# Patient Record
Sex: MALE | Race: OTHER | Employment: FULL TIME | URBAN - METROPOLITAN AREA
[De-identification: names, ages, dates, MRNs, and addresses within clinical notes are randomized per-mention and may not be internally consistent; named-entity substitution may affect disease eponyms.]

---

## 2017-01-21 ENCOUNTER — APPOINTMENT (OUTPATIENT)
Dept: LAB | Age: 52
End: 2017-01-21
Attending: INTERNAL MEDICINE
Payer: COMMERCIAL

## 2017-01-21 DIAGNOSIS — E78.1 HYPERTRIGLYCERIDEMIA: ICD-10-CM

## 2017-01-21 LAB
CHOLEST SERPL-MCNC: 153 MG/DL (ref 110–200)
HDLC SERPL-MCNC: 35 MG/DL
LDLC SERPL CALC-MCNC: 79 MG/DL (ref 0–99)
NONHDLC SERPL-MCNC: 118 MG/DL
TRIGL SERPL-MCNC: 196 MG/DL (ref 1–149)

## 2017-01-21 PROCEDURE — 80061 LIPID PANEL: CPT

## 2017-01-21 PROCEDURE — 36415 COLL VENOUS BLD VENIPUNCTURE: CPT

## 2017-01-24 ENCOUNTER — TELEPHONE (OUTPATIENT)
Dept: ENDOCRINOLOGY CLINIC | Facility: CLINIC | Age: 52
End: 2017-01-24

## 2017-01-24 NOTE — TELEPHONE ENCOUNTER
Tried calling patient back. Phone disconnected and then went to . Will route to AM to review when she next logs in. Results not urgent at this time.

## 2017-01-25 RX ORDER — DAPAGLIFLOZIN 5 MG/1
TABLET, FILM COATED ORAL
Qty: 90 TABLET | Refills: 0 | Status: SHIPPED | OUTPATIENT
Start: 2017-01-25 | End: 2017-01-26 | Stop reason: DRUGHIGH

## 2017-01-26 ENCOUNTER — TELEPHONE (OUTPATIENT)
Dept: ENDOCRINOLOGY CLINIC | Facility: CLINIC | Age: 52
End: 2017-01-26

## 2017-01-26 NOTE — TELEPHONE ENCOUNTER
[email or web address]. PA completed and sent to plan. Will await response.

## 2017-01-26 NOTE — TELEPHONE ENCOUNTER
Spoke with Christian and informed him of Dr. Shirin Calvin instructions below. He verbalized his understanding. He will use up his old Farxiga 5mg tablets by taking 2 per day then start new pills 10 mg 1 tab per day.

## 2017-01-27 ENCOUNTER — TELEPHONE (OUTPATIENT)
Dept: ENDOCRINOLOGY CLINIC | Facility: CLINIC | Age: 52
End: 2017-01-27

## 2017-01-27 NOTE — TELEPHONE ENCOUNTER
[facility] Called patient and let him know it is available. He verbalized his understanding.

## 2017-01-27 NOTE — TELEPHONE ENCOUNTER
PA for farxiga 10 mg has been approved through  1/26/18.  Called patient's pharmacy and informed them of approval.

## 2017-02-03 RX ORDER — LIRAGLUTIDE 6 MG/ML
INJECTION SUBCUTANEOUS
Qty: 27 ML | Refills: 0 | Status: SHIPPED | OUTPATIENT
Start: 2017-02-03 | End: 2017-05-14

## 2017-03-15 ENCOUNTER — TELEPHONE (OUTPATIENT)
Dept: GASTROENTEROLOGY | Facility: CLINIC | Age: 52
End: 2017-03-15

## 2017-03-20 ENCOUNTER — TELEPHONE (OUTPATIENT)
Dept: GASTROENTEROLOGY | Facility: CLINIC | Age: 52
End: 2017-03-20

## 2017-03-20 ENCOUNTER — OFFICE VISIT (OUTPATIENT)
Dept: GASTROENTEROLOGY | Facility: CLINIC | Age: 52
End: 2017-03-20

## 2017-03-20 VITALS
DIASTOLIC BLOOD PRESSURE: 80 MMHG | HEART RATE: 77 BPM | WEIGHT: 192.63 LBS | BODY MASS INDEX: 27.89 KG/M2 | SYSTOLIC BLOOD PRESSURE: 132 MMHG | HEIGHT: 69.5 IN

## 2017-03-20 DIAGNOSIS — Z80.0 FAMILY HISTORY- STOMACH CANCER: ICD-10-CM

## 2017-03-20 DIAGNOSIS — Z12.11 SCREENING FOR COLON CANCER: Primary | ICD-10-CM

## 2017-03-20 PROCEDURE — 99243 OFF/OP CNSLTJ NEW/EST LOW 30: CPT | Performed by: NURSE PRACTITIONER

## 2017-03-20 PROCEDURE — 99212 OFFICE O/P EST SF 10 MIN: CPT | Performed by: NURSE PRACTITIONER

## 2017-03-20 NOTE — TELEPHONE ENCOUNTER
All of GI staff gone, will follow up tomorrow--do not see where pt has been scheduled yet . Will then send message to Dr Pleitez Head.

## 2017-03-20 NOTE — H&P
[address] [facility] [address] Gastroenterology                                                                                                  Clinic History and Physical     Pa pertinent past surgical history.    Family Hx:   Family History   Problem Relation Age of Onset   • Diabetes Father    • Cancer Mother      stomach cancer at 80      Social History:   Smoking Status: Never Smoker                      Alcohol Use: No Blood pressure 132/80, pulse 77, height 5' 9.5\" (1.765 m), weight 192 lb 9.6 oz (87.363 kg).     Gen: patient appears comfortable and in no acute distress  HEENT: conjunctiva pink, the sclera appears anicteric, oropharynx clear, mucus membranes appear mo and information will be relayed to pt    4.  Non-productive cough: pt advised to f/u w/ PCP for evaluation and treatment      Recommend:  -Schedule colonoscopy w/ Dr. Edward Medrano w/ IV Twilight  -Prep: Colyte (eRx)  -Anti-platelets and anti-coagulants: No

## 2017-03-20 NOTE — PATIENT INSTRUCTIONS
1. Schedule colonoscopy with Dr. Jenni Montgomery with IV twilight    2.  bowel prep from [address] [place] [address] (eR)    3. Medication adjustment:       A. Hold metformin the day before and day of the procedure.      B. My office will contact Dr. Jonna Colon

## 2017-03-20 NOTE — TELEPHONE ENCOUNTER
Nursing– please follow-up with Dr. Jessie Sinclair  (endocrinology) for recommendations regarding the Victoza/Farxiga dosing prior to colonoscopy. Thank you.

## 2017-03-21 ENCOUNTER — OFFICE VISIT (OUTPATIENT)
Dept: FAMILY MEDICINE CLINIC | Facility: CLINIC | Age: 52
End: 2017-03-21

## 2017-03-21 ENCOUNTER — HOSPITAL ENCOUNTER (OUTPATIENT)
Dept: GENERAL RADIOLOGY | Age: 52
Discharge: HOME | End: 2017-03-21
Attending: FAMILY MEDICINE
Payer: COMMERCIAL

## 2017-03-21 VITALS
BODY MASS INDEX: 27.51 KG/M2 | DIASTOLIC BLOOD PRESSURE: 81 MMHG | HEIGHT: 69.5 IN | WEIGHT: 190 LBS | OXYGEN SATURATION: 98 % | TEMPERATURE: 98 F | HEART RATE: 80 BPM | SYSTOLIC BLOOD PRESSURE: 116 MMHG

## 2017-03-21 DIAGNOSIS — R05.9 COUGH: ICD-10-CM

## 2017-03-21 DIAGNOSIS — R05.9 COUGH: Primary | ICD-10-CM

## 2017-03-21 PROCEDURE — 71020 XR CHEST PA + LAT CHEST (CPT=71020): CPT

## 2017-03-21 PROCEDURE — 99213 OFFICE O/P EST LOW 20 MIN: CPT | Performed by: FAMILY MEDICINE

## 2017-03-21 PROCEDURE — 94640 AIRWAY INHALATION TREATMENT: CPT | Performed by: FAMILY MEDICINE

## 2017-03-21 PROCEDURE — 99214 OFFICE O/P EST MOD 30 MIN: CPT | Performed by: FAMILY MEDICINE

## 2017-03-21 RX ORDER — ALBUTEROL SULFATE 2.5 MG/3ML
2.5 SOLUTION RESPIRATORY (INHALATION) ONCE
Status: COMPLETED | OUTPATIENT
Start: 2017-03-21 | End: 2017-03-21

## 2017-03-21 RX ORDER — ALBUTEROL SULFATE 90 UG/1
2 AEROSOL, METERED RESPIRATORY (INHALATION) EVERY 4 HOURS PRN
Qty: 1 INHALER | Refills: 6 | Status: SHIPPED | OUTPATIENT
Start: 2017-03-21 | End: 2017-06-02 | Stop reason: ALTCHOICE

## 2017-03-21 RX ORDER — CODEINE PHOSPHATE AND GUAIFENESIN 10; 100 MG/5ML; MG/5ML
10 SOLUTION ORAL EVERY 6 HOURS PRN
Qty: 200 ML | Refills: 0 | Status: SHIPPED | OUTPATIENT
Start: 2017-03-21 | End: 2017-06-02

## 2017-03-21 RX ORDER — MONTELUKAST SODIUM 10 MG/1
10 TABLET ORAL NIGHTLY
Qty: 30 TABLET | Refills: 0 | Status: SHIPPED | OUTPATIENT
Start: 2017-03-21 | End: 2018-07-03

## 2017-03-21 RX ORDER — AMOXICILLIN AND CLAVULANATE POTASSIUM 875; 125 MG/1; MG/1
TABLET, FILM COATED ORAL
Refills: 0 | COMMUNITY
Start: 2017-03-10 | End: 2017-03-24 | Stop reason: ALTCHOICE

## 2017-03-21 RX ADMIN — ALBUTEROL SULFATE 2.5 MG: 2.5 SOLUTION RESPIRATORY (INHALATION) at 09:48:00

## 2017-03-21 NOTE — PROGRESS NOTES
Cough 3 weeks   On augmentin for tooth work for almost 10 days.   Was on medrol pack without benifiti    Where: nasal congestion and cough  Quality (Constant/Sharp?): sharp  Severity: mild mod pain  Duration: 3 weeks  Timing: constant  When does it occur: d

## 2017-03-21 NOTE — TELEPHONE ENCOUNTER
I spoke to Dr Alisia Everett in person, clarifying diabetic orders: Take only Metformin day before procedure. Take only Metformin morning of procedure. Check blood glucose in AM before procedure and call Dr Alisia Everett if less than 80 or greater than 250.   May

## 2017-03-21 NOTE — TELEPHONE ENCOUNTER
Dr. Corbett Standard please note below colonoscopy. Pt will have light breakfast day prior, then clear liquids only. Please advise on Metformin, Victoza, and [place]. Pt has appt with you 3/24. Thanks very much.      I spoke to [facility] RN who scheduled pt last night

## 2017-03-24 ENCOUNTER — OFFICE VISIT (OUTPATIENT)
Dept: ENDOCRINOLOGY CLINIC | Facility: CLINIC | Age: 52
End: 2017-03-24

## 2017-03-24 ENCOUNTER — TELEPHONE (OUTPATIENT)
Dept: ENDOCRINOLOGY CLINIC | Facility: CLINIC | Age: 52
End: 2017-03-24

## 2017-03-24 VITALS
HEIGHT: 69.5 IN | WEIGHT: 188 LBS | DIASTOLIC BLOOD PRESSURE: 75 MMHG | RESPIRATION RATE: 18 BRPM | TEMPERATURE: 98 F | HEART RATE: 74 BPM | BODY MASS INDEX: 27.22 KG/M2 | SYSTOLIC BLOOD PRESSURE: 112 MMHG

## 2017-03-24 DIAGNOSIS — E11.9 TYPE 2 DIABETES MELLITUS WITHOUT COMPLICATION, WITHOUT LONG-TERM CURRENT USE OF INSULIN (HCC): Primary | ICD-10-CM

## 2017-03-24 LAB
CARTRIDGE LOT#: ABNORMAL NUMERIC
GLUCOSE BLOOD: 106
HEMOGLOBIN A1C: 7 % (ref 4.3–5.6)
TEST STRIP LOT #: NORMAL NUMERIC

## 2017-03-24 PROCEDURE — 82962 GLUCOSE BLOOD TEST: CPT | Performed by: INTERNAL MEDICINE

## 2017-03-24 PROCEDURE — 83036 HEMOGLOBIN GLYCOSYLATED A1C: CPT | Performed by: INTERNAL MEDICINE

## 2017-03-24 PROCEDURE — 36416 COLLJ CAPILLARY BLOOD SPEC: CPT | Performed by: INTERNAL MEDICINE

## 2017-03-24 PROCEDURE — 99213 OFFICE O/P EST LOW 20 MIN: CPT | Performed by: INTERNAL MEDICINE

## 2017-03-24 NOTE — TELEPHONE ENCOUNTER
Saw patient in the clinic. Please see below. Some changes were made. Day before procedure: MTF and victoza  Day of procedure: MARISELA in the morning, he will come home and take [facility].

## 2017-03-27 NOTE — TELEPHONE ENCOUNTER
See 3/24 Dr Jonna Colon encounter. Pt was in office and was given below diabetic instructions per the doctor. No further action taken.

## 2017-04-01 ENCOUNTER — TELEPHONE (OUTPATIENT)
Dept: FAMILY MEDICINE CLINIC | Facility: CLINIC | Age: 52
End: 2017-04-01

## 2017-04-01 RX ORDER — BENZONATATE 200 MG/1
200 CAPSULE ORAL 3 TIMES DAILY PRN
Qty: 30 CAPSULE | Refills: 0 | Status: SHIPPED | OUTPATIENT
Start: 2017-04-01 | End: 2017-06-02

## 2017-04-01 NOTE — TELEPHONE ENCOUNTER
Actions Requested: No improvement in dry cough since seen 3/21/17 and taking meds as prescribed (only present when up). Wanted f/u appt with [facility] today but schedule is held. Asking what else can be done?   Situation/Background   Problem: Dry cough continues

## 2017-04-01 NOTE — TELEPHONE ENCOUNTER
Tessalon 200mg 1 po tid prn cough #30 no refills. cxr normal.  Not unusual to cough 18 days with bronchitis.

## 2017-04-01 NOTE — TELEPHONE ENCOUNTER
Barbadian speaking Patient called and states he has had a cough for a month and a half and it just does not want to go away   Patient states he came in to see doctor and has been prescribed medication but it has not worked he still has the cough   The cough

## 2017-04-01 NOTE — TELEPHONE ENCOUNTER
Pt informed of [facility]'s response below and agrees with Tessalon med rx. Will call back next week if not improving and will go to IC if worsening before then. rx sent.

## 2017-04-04 ENCOUNTER — TELEPHONE (OUTPATIENT)
Dept: FAMILY MEDICINE CLINIC | Facility: CLINIC | Age: 52
End: 2017-04-04

## 2017-04-04 DIAGNOSIS — R05.9 COUGH: Primary | ICD-10-CM

## 2017-04-04 NOTE — TELEPHONE ENCOUNTER
Pt called on 4/1/17 regarding persistent dry cough since 3/21/17. Was rx's tessalon TID. Pt states has been taking as prescribed since Saturday; no improvement in dry cough. States cough very frequent during the day. At night pt able to sleep, no issues.  p

## 2017-04-04 NOTE — TELEPHONE ENCOUNTER
Patient notified of MD's recommendation. Patient verbalized understanding.   PULM # given for patient to consult

## 2017-04-04 NOTE — TELEPHONE ENCOUNTER
Pt states that he still has the cough. Pt states that the medication that was prescribed to him is not working. Pt would like know what else doctor recommends. Pt is requesting Liberian speaking RN to call her back.

## 2017-04-25 NOTE — TELEPHONE ENCOUNTER
Dr. Lambert Christensen please note below colonoscopy. Pt will have light breakfast day prior, then clear liquids only. Please advise on Metformin, Victoza, and [place]. Pt has appt with you 3/24. Thanks very much.      I spoke to Meeta Vega RN who scheduled pt last night

## 2017-04-25 NOTE — TELEPHONE ENCOUNTER
Due to the MAC/IV schedule LM for patient to reschedule for the following time:    Scheduled for: Enzo Nichols    Provider Name: Dr. Aniya Weldon  Date:  Friday, 5/12/17  Location:  [facility]  Sedation:  IV  Time:  9:45am(arrival of 8:45am)  Prep: Colyte  Meds/Allergies R

## 2017-04-26 RX ORDER — DAPAGLIFLOZIN 10 MG/1
TABLET, FILM COATED ORAL
Qty: 90 TABLET | Refills: 0 | Status: SHIPPED | OUTPATIENT
Start: 2017-04-26 | End: 2017-07-31

## 2017-05-12 ENCOUNTER — HOSPITAL ENCOUNTER (OUTPATIENT)
Facility: HOSPITAL | Age: 52
Setting detail: HOSPITAL OUTPATIENT SURGERY
Discharge: HOME | End: 2017-05-12
Attending: INTERNAL MEDICINE | Admitting: INTERNAL MEDICINE
Payer: COMMERCIAL

## 2017-05-12 ENCOUNTER — SURGERY (OUTPATIENT)
Age: 52
End: 2017-05-12

## 2017-05-12 VITALS
WEIGHT: 187.31 LBS | DIASTOLIC BLOOD PRESSURE: 87 MMHG | HEIGHT: 70 IN | OXYGEN SATURATION: 97 % | BODY MASS INDEX: 26.81 KG/M2 | HEART RATE: 67 BPM | RESPIRATION RATE: 22 BRPM | SYSTOLIC BLOOD PRESSURE: 126 MMHG

## 2017-05-12 PROCEDURE — 45378 DIAGNOSTIC COLONOSCOPY: CPT | Performed by: INTERNAL MEDICINE

## 2017-05-12 PROCEDURE — 0DJD8ZZ INSPECTION OF LOWER INTESTINAL TRACT, VIA NATURAL OR ARTIFICIAL OPENING ENDOSCOPIC: ICD-10-PCS | Performed by: INTERNAL MEDICINE

## 2017-05-12 PROCEDURE — 99152 MOD SED SAME PHYS/QHP 5/>YRS: CPT | Performed by: INTERNAL MEDICINE

## 2017-05-12 RX ORDER — SODIUM CHLORIDE 0.9 % (FLUSH) 0.9 %
10 SYRINGE (ML) INJECTION AS NEEDED
Status: DISCONTINUED | OUTPATIENT
Start: 2017-05-12 | End: 2017-05-12

## 2017-05-12 RX ORDER — MIDAZOLAM HYDROCHLORIDE 1 MG/ML
1 INJECTION INTRAMUSCULAR; INTRAVENOUS EVERY 5 MIN PRN
Status: DISCONTINUED | OUTPATIENT
Start: 2017-05-12 | End: 2017-05-12

## 2017-05-12 RX ORDER — SODIUM CHLORIDE, SODIUM LACTATE, POTASSIUM CHLORIDE, CALCIUM CHLORIDE 600; 310; 30; 20 MG/100ML; MG/100ML; MG/100ML; MG/100ML
INJECTION, SOLUTION INTRAVENOUS CONTINUOUS
Status: DISCONTINUED | OUTPATIENT
Start: 2017-05-12 | End: 2017-05-12

## 2017-05-12 RX ORDER — MIDAZOLAM HYDROCHLORIDE 1 MG/ML
INJECTION INTRAMUSCULAR; INTRAVENOUS
Status: DISCONTINUED | OUTPATIENT
Start: 2017-05-12 | End: 2017-05-12

## 2017-05-12 NOTE — H&P
History & Physical Examination    Patient Name: Dalia Millan  MRN: [id number]  [facility]: [id number]  YOB: 1965    Diagnosis: colon screening          Prescriptions prior to admission:  FARXIGA 10 MG Oral Tab TAKE 1 TABLET BY MOUTH Barber Anaya fentaNYL citrate (SUBLIMAZE) 0.05 MG/ML injection 25 mcg 25 mcg Intravenous Q5 Min PRN       Allergies: No Known Allergies    Past Medical History   Diagnosis Date   • Type II or unspecified type diabetes mellitus without mention of complication, not sta

## 2017-05-12 NOTE — OPERATIVE REPORT
[facility] HOSP - [facility] Endoscopy Report      Preoperative Diagnosis:  - colon cancer screening      Postoperative Diagnosis:  - small internal hemorrhoids      Procedure:    Colonoscopy         Surgeon:  Michaela Cruz M.D.     Anesthesia:  Fentanyl:  1

## 2017-05-15 RX ORDER — LIRAGLUTIDE 6 MG/ML
INJECTION SUBCUTANEOUS
Qty: 27 ML | Refills: 0 | Status: SHIPPED | OUTPATIENT
Start: 2017-05-15 | End: 2017-06-02

## 2017-06-02 ENCOUNTER — OFFICE VISIT (OUTPATIENT)
Dept: FAMILY MEDICINE CLINIC | Facility: CLINIC | Age: 52
End: 2017-06-02

## 2017-06-02 VITALS
DIASTOLIC BLOOD PRESSURE: 74 MMHG | SYSTOLIC BLOOD PRESSURE: 113 MMHG | WEIGHT: 194 LBS | BODY MASS INDEX: 28 KG/M2 | HEART RATE: 69 BPM

## 2017-06-02 DIAGNOSIS — M25.512 CHRONIC LEFT SHOULDER PAIN: Primary | ICD-10-CM

## 2017-06-02 DIAGNOSIS — G89.29 CHRONIC LEFT SHOULDER PAIN: Primary | ICD-10-CM

## 2017-06-02 PROCEDURE — 99212 OFFICE O/P EST SF 10 MIN: CPT | Performed by: FAMILY MEDICINE

## 2017-06-02 NOTE — PROGRESS NOTES
Chronic left shoulder pain  Mri  Reviewed  \"It hurts all the time. \"  Sleeping well    Exam  Pain with rom testing  No swelling    A/p  1.  Chronic left shoulder pain  Referral.  - Fede Crane

## 2017-06-22 RX ORDER — PEN NEEDLE, DIABETIC 31 GX5/16"
NEEDLE, DISPOSABLE MISCELLANEOUS
Qty: 90 EACH | Refills: 2 | Status: SHIPPED | OUTPATIENT
Start: 2017-06-22 | End: 2018-02-03

## 2017-07-31 RX ORDER — DAPAGLIFLOZIN 10 MG/1
TABLET, FILM COATED ORAL
Qty: 90 TABLET | Refills: 0 | Status: SHIPPED | OUTPATIENT
Start: 2017-07-31 | End: 2017-10-23

## 2017-08-09 RX ORDER — LIRAGLUTIDE 6 MG/ML
INJECTION SUBCUTANEOUS
Qty: 27 ML | Refills: 0 | Status: SHIPPED | OUTPATIENT
Start: 2017-08-09 | End: 2017-11-10

## 2017-08-09 NOTE — TELEPHONE ENCOUNTER
LOV 3/24/17 with RTC 4 months. No F/U scheduled. Called patient with a . Booked appt with Dr. Francis Elder on Friday.

## 2017-08-11 ENCOUNTER — OFFICE VISIT (OUTPATIENT)
Dept: ENDOCRINOLOGY CLINIC | Facility: CLINIC | Age: 52
End: 2017-08-11

## 2017-08-11 VITALS
HEART RATE: 78 BPM | DIASTOLIC BLOOD PRESSURE: 66 MMHG | BODY MASS INDEX: 27.66 KG/M2 | WEIGHT: 193.19 LBS | SYSTOLIC BLOOD PRESSURE: 112 MMHG | HEIGHT: 70 IN

## 2017-08-11 DIAGNOSIS — E11.9 TYPE 2 DIABETES MELLITUS WITHOUT COMPLICATION, WITHOUT LONG-TERM CURRENT USE OF INSULIN (HCC): Primary | ICD-10-CM

## 2017-08-11 LAB
CARTRIDGE LOT#: ABNORMAL NUMERIC
GLUCOSE BLOOD: 126
HEMOGLOBIN A1C: 7 % (ref 4.3–5.6)
TEST STRIP LOT #: NORMAL NUMERIC

## 2017-08-11 PROCEDURE — 36416 COLLJ CAPILLARY BLOOD SPEC: CPT | Performed by: INTERNAL MEDICINE

## 2017-08-11 PROCEDURE — 99212 OFFICE O/P EST SF 10 MIN: CPT | Performed by: INTERNAL MEDICINE

## 2017-08-11 PROCEDURE — 99213 OFFICE O/P EST LOW 20 MIN: CPT | Performed by: INTERNAL MEDICINE

## 2017-08-11 PROCEDURE — 83036 HEMOGLOBIN GLYCOSYLATED A1C: CPT | Performed by: INTERNAL MEDICINE

## 2017-08-11 PROCEDURE — 82962 GLUCOSE BLOOD TEST: CPT | Performed by: INTERNAL MEDICINE

## 2017-08-11 NOTE — PROGRESS NOTES
Return Office Visit     CHIEF COMPLAINT:    DM     HISTORY OF PRESENT ILLNESS:  Neha Brown is a 47year old male who presents for follow up for for DM.      DM HISTORY:  Diagnosed: Around age 29      HISTORY OF DIABETES COMPLICATIONS: :  Histo reviewed. See past social history marked as reviewed.     ASSESSMENTS:       REVIEW OF SYSTEMS:  Constitutional: Negative for: weight change, fever, fatigue, cold/heat intolerance  Eyes: Negative for:  Visual changes, proptosis, blurring  ENT: Negative for understands the importance of glycemic control and the implications of uncontrolled diabetes including Diabetic ketoacidosis and various micro vascular and macrovascular complications. Goal a1c: < 7 %   Goal Fasting, Goal pre meal:   a).  Medicatio

## 2017-08-28 RX ORDER — TADALAFIL 5 MG
TABLET ORAL
Qty: 30 TABLET | Refills: 0 | Status: SHIPPED | OUTPATIENT
Start: 2017-08-28 | End: 2018-07-22

## 2017-10-23 ENCOUNTER — TELEPHONE (OUTPATIENT)
Dept: ENDOCRINOLOGY CLINIC | Facility: CLINIC | Age: 52
End: 2017-10-23

## 2017-10-23 RX ORDER — DAPAGLIFLOZIN 10 MG/1
TABLET, FILM COATED ORAL
Qty: 90 TABLET | Refills: 0 | Status: SHIPPED | OUTPATIENT
Start: 2017-10-23 | End: 2018-01-27

## 2017-10-23 NOTE — TELEPHONE ENCOUNTER
Sent patient a [employer] message informing him Dr. Beto Levi would like to see him for follow up in January.

## 2017-10-27 ENCOUNTER — LAB ENCOUNTER (OUTPATIENT)
Dept: LAB | Age: 52
End: 2017-10-27
Attending: INTERNAL MEDICINE
Payer: COMMERCIAL

## 2017-10-27 ENCOUNTER — OFFICE VISIT (OUTPATIENT)
Dept: FAMILY MEDICINE CLINIC | Facility: CLINIC | Age: 52
End: 2017-10-27

## 2017-10-27 VITALS
WEIGHT: 193 LBS | BODY MASS INDEX: 28 KG/M2 | SYSTOLIC BLOOD PRESSURE: 113 MMHG | TEMPERATURE: 99 F | HEART RATE: 73 BPM | DIASTOLIC BLOOD PRESSURE: 76 MMHG

## 2017-10-27 DIAGNOSIS — R59.1 LYMPHADENOPATHY: ICD-10-CM

## 2017-10-27 DIAGNOSIS — Z23 NEED FOR VACCINATION: Primary | ICD-10-CM

## 2017-10-27 DIAGNOSIS — E11.9 TYPE 2 DIABETES MELLITUS WITHOUT COMPLICATION, WITHOUT LONG-TERM CURRENT USE OF INSULIN (HCC): ICD-10-CM

## 2017-10-27 PROCEDURE — 85025 COMPLETE CBC W/AUTO DIFF WBC: CPT

## 2017-10-27 PROCEDURE — 90471 IMMUNIZATION ADMIN: CPT | Performed by: FAMILY MEDICINE

## 2017-10-27 PROCEDURE — 90686 IIV4 VACC NO PRSV 0.5 ML IM: CPT | Performed by: FAMILY MEDICINE

## 2017-10-27 PROCEDURE — 82570 ASSAY OF URINE CREATININE: CPT

## 2017-10-27 PROCEDURE — 99212 OFFICE O/P EST SF 10 MIN: CPT | Performed by: FAMILY MEDICINE

## 2017-10-27 PROCEDURE — 80061 LIPID PANEL: CPT

## 2017-10-27 PROCEDURE — 82043 UR ALBUMIN QUANTITATIVE: CPT

## 2017-10-27 PROCEDURE — 99213 OFFICE O/P EST LOW 20 MIN: CPT | Performed by: FAMILY MEDICINE

## 2017-10-27 PROCEDURE — 36415 COLL VENOUS BLD VENIPUNCTURE: CPT

## 2017-10-27 RX ORDER — NAPROXEN 500 MG/1
500 TABLET ORAL 2 TIMES DAILY WITH MEALS
Qty: 60 TABLET | Refills: 0 | Status: SHIPPED | OUTPATIENT
Start: 2017-10-27 | End: 2018-07-03

## 2017-10-27 RX ORDER — AZITHROMYCIN 250 MG/1
TABLET, FILM COATED ORAL
Qty: 6 TABLET | Refills: 0 | Status: SHIPPED | OUTPATIENT
Start: 2017-10-27 | End: 2018-01-19 | Stop reason: ALTCHOICE

## 2017-10-27 NOTE — PROGRESS NOTES
Left neck pain  1 week  No fever  Had dental implant work    No sore throat. Exam    Patient's past medical surgical family social history was reviewed.     Review of Systems  Allergic: no environmental allergies or food allergies  Cardiovascular: no c

## 2017-10-28 ENCOUNTER — TELEPHONE (OUTPATIENT)
Dept: ENDOCRINOLOGY CLINIC | Facility: CLINIC | Age: 52
End: 2017-10-28

## 2017-10-28 DIAGNOSIS — E11.65 UNCONTROLLED TYPE 2 DIABETES MELLITUS WITH HYPERGLYCEMIA, WITHOUT LONG-TERM CURRENT USE OF INSULIN (HCC): Primary | ICD-10-CM

## 2017-10-28 NOTE — TELEPHONE ENCOUNTER
LDL is normal  TG levels are elevated. Please mail low fat diet. Has protein in urine BG control will help. Also, I recommend starting a medictaion called lisinopril 2.5 mg daily. This prevents progression to kidney dsease.    Please discuss SE of cou

## 2017-10-31 ENCOUNTER — TELEPHONE (OUTPATIENT)
Dept: FAMILY MEDICINE CLINIC | Facility: CLINIC | Age: 52
End: 2017-10-31

## 2017-10-31 NOTE — TELEPHONE ENCOUNTER
Pt would like his blood test results entered in his my chart. Per pt he had his blood work done this weekend.

## 2017-11-01 RX ORDER — LISINOPRIL 2.5 MG/1
2.5 TABLET ORAL DAILY
Qty: 90 TABLET | Refills: 0 | Status: SHIPPED | OUTPATIENT
Start: 2017-11-01 | End: 2018-01-27

## 2017-11-01 NOTE — TELEPHONE ENCOUNTER
Spoke with [employer]. Informed him of Dr. Denny Donato message below. Mailed low fat diet information. Prescribed medication. Ordered lab.

## 2017-11-10 ENCOUNTER — TELEPHONE (OUTPATIENT)
Dept: ENDOCRINOLOGY CLINIC | Facility: CLINIC | Age: 52
End: 2017-11-10

## 2017-11-10 RX ORDER — LIRAGLUTIDE 6 MG/ML
INJECTION SUBCUTANEOUS
Qty: 27 ML | Refills: 0 | Status: SHIPPED | OUTPATIENT
Start: 2017-11-10 | End: 2018-02-03

## 2017-11-10 NOTE — TELEPHONE ENCOUNTER
[facility] [address] [place] [id number]. Informed him that he will be due for his next appt in January. Booked appt in January.

## 2018-01-19 ENCOUNTER — OFFICE VISIT (OUTPATIENT)
Dept: ENDOCRINOLOGY CLINIC | Facility: CLINIC | Age: 53
End: 2018-01-19

## 2018-01-19 VITALS
HEIGHT: 70 IN | SYSTOLIC BLOOD PRESSURE: 112 MMHG | DIASTOLIC BLOOD PRESSURE: 73 MMHG | BODY MASS INDEX: 27.83 KG/M2 | HEART RATE: 84 BPM | WEIGHT: 194.38 LBS

## 2018-01-19 DIAGNOSIS — E78.5 DYSLIPIDEMIA: ICD-10-CM

## 2018-01-19 DIAGNOSIS — E11.65 UNCONTROLLED TYPE 2 DIABETES MELLITUS WITH HYPERGLYCEMIA, WITHOUT LONG-TERM CURRENT USE OF INSULIN (HCC): Primary | ICD-10-CM

## 2018-01-19 PROBLEM — E78.1 HYPERTRIGLYCERIDEMIA: Status: ACTIVE | Noted: 2018-01-19

## 2018-01-19 LAB
CARTRIDGE LOT#: ABNORMAL NUMERIC
GLUCOSE BLOOD: 185
HEMOGLOBIN A1C: 6.6 % (ref 4.3–5.6)
TEST STRIP LOT #: NORMAL NUMERIC

## 2018-01-19 PROCEDURE — 36416 COLLJ CAPILLARY BLOOD SPEC: CPT | Performed by: INTERNAL MEDICINE

## 2018-01-19 PROCEDURE — 99213 OFFICE O/P EST LOW 20 MIN: CPT | Performed by: INTERNAL MEDICINE

## 2018-01-19 PROCEDURE — 83036 HEMOGLOBIN GLYCOSYLATED A1C: CPT | Performed by: INTERNAL MEDICINE

## 2018-01-19 PROCEDURE — 99212 OFFICE O/P EST SF 10 MIN: CPT | Performed by: INTERNAL MEDICINE

## 2018-01-19 PROCEDURE — 82962 GLUCOSE BLOOD TEST: CPT | Performed by: INTERNAL MEDICINE

## 2018-01-19 NOTE — PROGRESS NOTES
Return Office Visit     CHIEF COMPLAINT:    DM   Hypertriglyceridemia    HISTORY OF PRESENT ILLNESS:  Jailene Francois is a 47year old male who presents for follow up for for DM.      DM HISTORY:  Diagnosed: Around age 29      HISTORY OF DIABETES C past medical history marked as reviewed. See past surgical history marked as reviewed. See past family history marked as reviewed. See past social history marked as reviewed.     ASSESSMENTS:       REVIEW OF SYSTEMS:  Constitutional: Negative for: weight DM:       Plan:  Discussed the pathogenesis, natural course of diabetes.  Patient understands the importance of glycemic control and the implications of uncontrolled diabetes including Diabetic ketoacidosis and various micro vascular and macrovascular compl

## 2018-01-29 RX ORDER — LISINOPRIL 2.5 MG/1
TABLET ORAL
Qty: 90 TABLET | Refills: 0 | Status: SHIPPED | OUTPATIENT
Start: 2018-01-29 | End: 2018-05-02

## 2018-01-29 RX ORDER — DAPAGLIFLOZIN 10 MG/1
TABLET, FILM COATED ORAL
Qty: 90 TABLET | Refills: 0 | OUTPATIENT
Start: 2018-01-29

## 2018-01-29 RX ORDER — LISINOPRIL 2.5 MG/1
TABLET ORAL
Qty: 90 TABLET | Refills: 0 | OUTPATIENT
Start: 2018-01-29

## 2018-01-29 RX ORDER — DAPAGLIFLOZIN 10 MG/1
TABLET, FILM COATED ORAL
Qty: 90 TABLET | Refills: 0 | Status: SHIPPED | OUTPATIENT
Start: 2018-01-29 | End: 2018-02-07

## 2018-02-02 ENCOUNTER — TELEPHONE (OUTPATIENT)
Dept: ENDOCRINOLOGY CLINIC | Facility: CLINIC | Age: 53
End: 2018-02-02

## 2018-02-02 NOTE — TELEPHONE ENCOUNTER
Received call from patient that he was unable to  prescription from pharmacy. Needs PA. PA completed with [facility]. Pending. PA number is [id number]. Informed patient we will let him know when approved or denied.

## 2018-02-05 ENCOUNTER — TELEPHONE (OUTPATIENT)
Dept: ENDOCRINOLOGY CLINIC | Facility: CLINIC | Age: 53
End: 2018-02-05

## 2018-02-05 RX ORDER — PEN NEEDLE, DIABETIC 31 GX5/16"
NEEDLE, DISPOSABLE MISCELLANEOUS
Qty: 90 EACH | Refills: 6 | Status: SHIPPED | OUTPATIENT
Start: 2018-02-05 | End: 2019-06-18

## 2018-02-05 RX ORDER — LIRAGLUTIDE 6 MG/ML
INJECTION SUBCUTANEOUS
Qty: 27 ML | Refills: 1 | Status: SHIPPED | OUTPATIENT
Start: 2018-02-05 | End: 2018-07-14

## 2018-02-05 NOTE — TELEPHONE ENCOUNTER
Pt states insurance is not covering medication     Current Outpatient Prescriptions:   •  FARXIGA 10 MG Oral Tab, TAKE 1 TABLET BY MOUTH DAILY, Disp: 90 tablet, Rfl: 0    Pt states he has been week without medication.  Please call thank you

## 2018-02-07 NOTE — TELEPHONE ENCOUNTER
Routed message on 2/5 to see if ok to switch to covered alternative given PA denial?    Called patient and LMTCB to inform him of status.

## 2018-02-08 ENCOUNTER — TELEPHONE (OUTPATIENT)
Dept: ENDOCRINOLOGY CLINIC | Facility: CLINIC | Age: 53
End: 2018-02-08

## 2018-02-08 NOTE — TELEPHONE ENCOUNTER
Called the pharmacy. The medication is covered, but the $150 is a copay amount. Any other alternative in the same family would have a similar copay. Often patients need to meet a deductible in the beginning of the year making medication costs higher.  Meir Reed

## 2018-02-08 NOTE — TELEPHONE ENCOUNTER
Called patient with a . Informed him of deductible situation below. Patient will try the copay card first and will call if the cost is still too much after the copay card. Placed card at the  for the patient today.  Provided off

## 2018-02-08 NOTE — TELEPHONE ENCOUNTER
Pt. states that he is not able to afford Jardiance medication $150. He states that the old medication was only $15. Please call to discuss.

## 2018-02-08 NOTE — TELEPHONE ENCOUNTER
Pt returning RN call Please call thank you. Pt is currently at work. Pt states he is in the office for the next 15 minutes.

## 2018-02-12 ENCOUNTER — TELEPHONE (OUTPATIENT)
Dept: FAMILY MEDICINE CLINIC | Facility: CLINIC | Age: 53
End: 2018-02-12

## 2018-02-12 NOTE — TELEPHONE ENCOUNTER
Pt would like to know if doctor can do an Immigration physical for him. Pt would like a call back in Mongolian.

## 2018-02-13 NOTE — TELEPHONE ENCOUNTER
CSS calling pt to inform them of message below. Pt states he will do PX at other Drs office but will need vaccinations records and orders to be checked for syphilis and gonorrhea. Pt states he may need titers if any vaccinations are missing.  Pt states he n

## 2018-02-13 NOTE — TELEPHONE ENCOUNTER
Patient informed of information below. Patient states he already had a px done with an immigration provider. Patient is requesting an office visit for a px in order to have Tdap and TB.      The immigration medical exam must be conducted by a physician jacquelyn

## 2018-02-23 ENCOUNTER — OFFICE VISIT (OUTPATIENT)
Dept: FAMILY MEDICINE CLINIC | Facility: CLINIC | Age: 53
End: 2018-02-23

## 2018-02-23 ENCOUNTER — APPOINTMENT (OUTPATIENT)
Dept: LAB | Age: 53
End: 2018-02-23
Attending: FAMILY MEDICINE
Payer: COMMERCIAL

## 2018-02-23 VITALS
BODY MASS INDEX: 27.5 KG/M2 | HEIGHT: 70 IN | HEART RATE: 76 BPM | SYSTOLIC BLOOD PRESSURE: 117 MMHG | DIASTOLIC BLOOD PRESSURE: 78 MMHG | WEIGHT: 192.06 LBS

## 2018-02-23 DIAGNOSIS — G89.29 CHRONIC RIGHT-SIDED LOW BACK PAIN WITHOUT SCIATICA: ICD-10-CM

## 2018-02-23 DIAGNOSIS — E11.9 DIABETES MELLITUS TYPE 2 IN NONOBESE (HCC): ICD-10-CM

## 2018-02-23 DIAGNOSIS — Z00.00 ROUTINE PHYSICAL EXAMINATION: ICD-10-CM

## 2018-02-23 DIAGNOSIS — Z00.00 ROUTINE PHYSICAL EXAMINATION: Primary | ICD-10-CM

## 2018-02-23 DIAGNOSIS — M54.50 CHRONIC RIGHT-SIDED LOW BACK PAIN WITHOUT SCIATICA: ICD-10-CM

## 2018-02-23 PROCEDURE — 90471 IMMUNIZATION ADMIN: CPT | Performed by: FAMILY MEDICINE

## 2018-02-23 PROCEDURE — 90715 TDAP VACCINE 7 YRS/> IM: CPT | Performed by: FAMILY MEDICINE

## 2018-02-23 PROCEDURE — 87591 N.GONORRHOEAE DNA AMP PROB: CPT

## 2018-02-23 PROCEDURE — 99396 PREV VISIT EST AGE 40-64: CPT | Performed by: FAMILY MEDICINE

## 2018-02-23 PROCEDURE — 90707 MMR VACCINE SC: CPT | Performed by: FAMILY MEDICINE

## 2018-02-23 PROCEDURE — 87491 CHLMYD TRACH DNA AMP PROBE: CPT

## 2018-02-23 PROCEDURE — 90472 IMMUNIZATION ADMIN EACH ADD: CPT | Performed by: FAMILY MEDICINE

## 2018-02-23 PROCEDURE — 90732 PPSV23 VACC 2 YRS+ SUBQ/IM: CPT | Performed by: FAMILY MEDICINE

## 2018-02-23 PROCEDURE — 86780 TREPONEMA PALLIDUM: CPT

## 2018-02-23 PROCEDURE — 36415 COLL VENOUS BLD VENIPUNCTURE: CPT

## 2018-02-23 NOTE — PROGRESS NOTES
Blood pressure 117/78, pulse 76, height 5' 10\" (1.778 m), weight 192 lb 1 oz (87.1 kg). REASON FOR VISIT:    Sana Cerda is a 47year old male who presents for an [address]         Patient Active Problem List:     Diabetes ( Lab Results  Component Value Date   TRIG 314 (H) 10/27/2017   TRIG 196 (H) 01/21/2017   TRIG 412 (H) 10/07/2016       Lab Results  Component Value Date   LDL 84 10/27/2017   LDL 79 01/21/2017   LDL  10/07/2016   Comment:     Caution is suggested when age, risk and gender LDL Cholesterol (mg/dL)   Date Value   10/27/2017 84   03/19/2016 77       Diabetes Screening  If history of high blood pressure or other  risk factors HEMOGLOBIN A1C (%)   Date Value   01/19/2018 6.6 (A)     Glucose (mg/dL)   Date Alie counseling given on importance of controller meds. ALLERGIES:   No Known Allergies    CURRENT MEDICATIONS:     Current Outpatient Prescriptions:  Empagliflozin (JARDIANCE) 25 MG Oral Tab Take 1 tablet by mouth daily.  Disp: 30 tablet Rfl: Cans of beer: 12 per week    Occ:  :         REVIEW OF SYSTEMS:   GENERAL: feels well otherwise  SKIN: denies any unusual skin lesions  EYES: denies blurred vision or double vision  HEENT: denies nasal congestion, sinus pain or ST  LUNGS: denies marcos PNEUMOCOCCAL IMM (PNEUMOVAX)  -     TETANUS, DIPHTHERIA TOXOIDS AND ACELLULAR PERTUSIS VACCINE (TDAP), >7 YEARS, IM USE       The patient indicates understanding of these issues and agrees to the plan. The patient is asked to return in  for .   Exercise co

## 2018-02-25 LAB
C TRACH DNA SPEC QL NAA+PROBE: NEGATIVE
N GONORRHOEA DNA SPEC QL NAA+PROBE: NEGATIVE

## 2018-02-26 LAB — T PALLIDUM AB SER QL: NEGATIVE

## 2018-02-27 ENCOUNTER — TELEPHONE (OUTPATIENT)
Dept: OTHER | Age: 53
End: 2018-02-27

## 2018-02-27 ENCOUNTER — TELEPHONE (OUTPATIENT)
Dept: FAMILY MEDICINE CLINIC | Facility: CLINIC | Age: 53
End: 2018-02-27

## 2018-02-27 NOTE — TELEPHONE ENCOUNTER
----- Message from Alexi Hi DO sent at 2/26/2018  7:44 PM CST -----  Gc/chlamydia and syphilis testing negative.

## 2018-02-27 NOTE — TELEPHONE ENCOUNTER
Pt called & he was looking for lab result. Pt verified his name, luisa,  he was informed of lab result done 18, pt stated understanding. Pt would like to get copy of her lab results at [facility] location. Site staff pls f/u thanks.

## 2018-03-02 ENCOUNTER — TELEPHONE (OUTPATIENT)
Dept: FAMILY MEDICINE CLINIC | Facility: CLINIC | Age: 53
End: 2018-03-02

## 2018-03-02 NOTE — TELEPHONE ENCOUNTER
Osmani Amin to pt. He states he needs these 2 vaccines due to immigrations purposes  \"completion of forms\".  Please advise

## 2018-03-02 NOTE — TELEPHONE ENCOUNTER
Pt would like to schedule a tb and the varicella vaccine. Pt states that he was seen in the office last week and they were not administered.

## 2018-03-02 NOTE — TELEPHONE ENCOUNTER
PLEASE ADVISE PATIENT THAT TB DOES NOT HAVE VACCINATION BUT A TEST AND THERE ARE 3 DIFFERENT WAYS TO TEST FOR IT. HAVE HIM CHECK HIS FORMS AND FIND OUT WHICH TEST HE WANTS DONE. WE MAY PROVIDE A VARICELLA VACCINE CHECK FOR ALLERGIES.

## 2018-03-03 NOTE — TELEPHONE ENCOUNTER
Advised patient on Dr. Yovani Dewitt information and recommendations. Patient verbalized understanding and he will find out which TB test is required and call back.  Advised to call back to schedule a nurse visit at the [facility] office for the varicella vaccine

## 2018-03-05 ENCOUNTER — NURSE ONLY (OUTPATIENT)
Dept: FAMILY MEDICINE CLINIC | Facility: CLINIC | Age: 53
End: 2018-03-05

## 2018-03-05 DIAGNOSIS — Z23 IMMUNIZATION DUE: Primary | ICD-10-CM

## 2018-03-05 PROCEDURE — 90716 VAR VACCINE LIVE SUBQ: CPT | Performed by: FAMILY MEDICINE

## 2018-03-05 PROCEDURE — 90471 IMMUNIZATION ADMIN: CPT | Performed by: FAMILY MEDICINE

## 2018-03-05 NOTE — PROGRESS NOTES
Pt came in today for his varicella injection. Ok per [facility]. See encounter. Pt tolerated injection well.

## 2018-05-02 RX ORDER — LISINOPRIL 2.5 MG/1
TABLET ORAL
Qty: 90 TABLET | Refills: 0 | Status: SHIPPED | OUTPATIENT
Start: 2018-05-02 | End: 2018-07-07

## 2018-05-31 ENCOUNTER — APPOINTMENT (OUTPATIENT)
Dept: LAB | Age: 53
End: 2018-05-31
Attending: INTERNAL MEDICINE
Payer: COMMERCIAL

## 2018-05-31 DIAGNOSIS — E11.65 UNCONTROLLED TYPE 2 DIABETES MELLITUS WITH HYPERGLYCEMIA, WITHOUT LONG-TERM CURRENT USE OF INSULIN (HCC): ICD-10-CM

## 2018-05-31 PROCEDURE — 36415 COLL VENOUS BLD VENIPUNCTURE: CPT

## 2018-05-31 PROCEDURE — 80053 COMPREHEN METABOLIC PANEL: CPT

## 2018-05-31 PROCEDURE — 82570 ASSAY OF URINE CREATININE: CPT

## 2018-05-31 PROCEDURE — 82043 UR ALBUMIN QUANTITATIVE: CPT

## 2018-06-04 ENCOUNTER — TELEPHONE (OUTPATIENT)
Dept: ENDOCRINOLOGY CLINIC | Facility: CLINIC | Age: 53
End: 2018-06-04

## 2018-06-04 NOTE — TELEPHONE ENCOUNTER
Spoke with patient and informed him of normal results. Discussed that he is due for follow up. Declined to schedule while on the phone but states he will call back soon to book after checking schedule.

## 2018-06-06 ENCOUNTER — TELEPHONE (OUTPATIENT)
Dept: ENDOCRINOLOGY CLINIC | Facility: CLINIC | Age: 53
End: 2018-06-06

## 2018-06-07 RX ORDER — EMPAGLIFLOZIN 25 MG/1
TABLET, FILM COATED ORAL
Qty: 30 TABLET | Refills: 2 | Status: SHIPPED | OUTPATIENT
Start: 2018-06-07 | End: 2018-08-22

## 2018-07-03 ENCOUNTER — OFFICE VISIT (OUTPATIENT)
Dept: ENDOCRINOLOGY CLINIC | Facility: CLINIC | Age: 53
End: 2018-07-03

## 2018-07-03 VITALS
BODY MASS INDEX: 27.77 KG/M2 | SYSTOLIC BLOOD PRESSURE: 121 MMHG | DIASTOLIC BLOOD PRESSURE: 80 MMHG | HEIGHT: 70 IN | HEART RATE: 75 BPM | WEIGHT: 194 LBS

## 2018-07-03 DIAGNOSIS — Z79.4 UNCONTROLLED TYPE 2 DIABETES MELLITUS WITH HYPERGLYCEMIA, WITH LONG-TERM CURRENT USE OF INSULIN (HCC): Primary | ICD-10-CM

## 2018-07-03 DIAGNOSIS — E78.5 DYSLIPIDEMIA: ICD-10-CM

## 2018-07-03 DIAGNOSIS — E11.65 UNCONTROLLED TYPE 2 DIABETES MELLITUS WITH HYPERGLYCEMIA, WITH LONG-TERM CURRENT USE OF INSULIN (HCC): Primary | ICD-10-CM

## 2018-07-03 LAB
CARTRIDGE EXPIRATION DATE: ABNORMAL DATE
CARTRIDGE LOT#: ABNORMAL NUMERIC
GLUCOSE BLOOD: 143
HEMOGLOBIN A1C: 6.4 % (ref 4.3–5.6)
TEST STRIP LOT #: NORMAL NUMERIC

## 2018-07-03 PROCEDURE — 99212 OFFICE O/P EST SF 10 MIN: CPT | Performed by: INTERNAL MEDICINE

## 2018-07-03 PROCEDURE — 99213 OFFICE O/P EST LOW 20 MIN: CPT | Performed by: INTERNAL MEDICINE

## 2018-07-03 PROCEDURE — 83036 HEMOGLOBIN GLYCOSYLATED A1C: CPT | Performed by: INTERNAL MEDICINE

## 2018-07-03 PROCEDURE — 36416 COLLJ CAPILLARY BLOOD SPEC: CPT | Performed by: INTERNAL MEDICINE

## 2018-07-03 PROCEDURE — 82962 GLUCOSE BLOOD TEST: CPT | Performed by: INTERNAL MEDICINE

## 2018-07-03 NOTE — PROGRESS NOTES
Return Office Visit     CHIEF COMPLAINT:    DM   Hypertriglyceridemia    HISTORY OF PRESENT ILLNESS:  Danny Burris is a 47year old male who presents for follow up for for DM.      DM HISTORY:  Diagnosed: Around age 29      HISTORY OF DIABETES C fatigue, cold/heat intolerance  Eyes: Negative for:  Visual changes, proptosis, blurring  ENT: Negative for:  dysphagia, neck swelling, dysphonia  Respiratory: Negative for:  dyspnea, cough  Cardiovascular: Negative for:  chest pain, palpitations, orthopne complications. Goal a1c: < 7 %   Goal Fasting, Goal pre meal:   a). Medications:  - c/w Metformin 1000 mg BID  No GI side effects.      - c/w victoza 1.8 mg daily  No personal or family history of MEN syndrome  Renal function is normal  Patient cou

## 2018-07-09 RX ORDER — LISINOPRIL 2.5 MG/1
TABLET ORAL
Qty: 60 TABLET | Refills: 2 | Status: SHIPPED | OUTPATIENT
Start: 2018-07-09 | End: 2018-12-28

## 2018-07-16 NOTE — TELEPHONE ENCOUNTER
From: Lauren Yeboah  Sent: 7/14/2018 3:23 PM CDT  Subject: Medication Renewal Request    Lauren Yeboah would like a refill of the following medications:     VICTOZA 18 MG/3ML Subcutaneous Solution Pen-injector Federico Freedman MD]

## 2018-07-17 NOTE — TELEPHONE ENCOUNTER
Diabetes Medications  Protocol Criteria:  · Appointment scheduled in the past 6 months or the next 3 months  · A1C < 7.5 in the past 6 months  · Creatinine in the past 12 months  · Creatinine result < 1.5   Recent Outpatient Visits            1 week ago Un

## 2018-07-22 ENCOUNTER — TELEPHONE (OUTPATIENT)
Dept: FAMILY MEDICINE CLINIC | Facility: CLINIC | Age: 53
End: 2018-07-22

## 2018-07-23 RX ORDER — TADALAFIL 5 MG/1
TABLET ORAL
Qty: 30 TABLET | Refills: 0 | Status: SHIPPED | OUTPATIENT
Start: 2018-07-23 | End: 2019-04-05 | Stop reason: SDUPTHER

## 2018-07-23 NOTE — TELEPHONE ENCOUNTER
From: Christopher Zarate  Sent: 7/22/2018 10:58 AM CDT  Subject: Medication Renewal Request    Christopher Zarate would like a refill of the following medications:     CIALIS 5 MG Oral Tab [Ozzy Domingo, ]     METFORMIN HCL 1000 MG Oral Tab [Ozzy [place]    Preferred pharmacy: [address] [place] [address] [phone]

## 2018-07-27 RX ORDER — TADALAFIL 5 MG
TABLET ORAL
Qty: 30 TABLET | Refills: 0 | OUTPATIENT
Start: 2018-07-27

## 2018-08-22 RX ORDER — EMPAGLIFLOZIN 25 MG/1
TABLET, FILM COATED ORAL
Qty: 30 TABLET | Refills: 2 | Status: SHIPPED | OUTPATIENT
Start: 2018-08-22 | End: 2018-11-25

## 2018-09-21 ENCOUNTER — APPOINTMENT (OUTPATIENT)
Dept: LAB | Age: 53
End: 2018-09-21
Attending: INTERNAL MEDICINE
Payer: COMMERCIAL

## 2018-09-21 ENCOUNTER — OFFICE VISIT (OUTPATIENT)
Dept: FAMILY MEDICINE CLINIC | Facility: CLINIC | Age: 53
End: 2018-09-21
Payer: COMMERCIAL

## 2018-09-21 VITALS
HEART RATE: 80 BPM | SYSTOLIC BLOOD PRESSURE: 113 MMHG | TEMPERATURE: 98 F | DIASTOLIC BLOOD PRESSURE: 73 MMHG | BODY MASS INDEX: 28 KG/M2 | WEIGHT: 192 LBS

## 2018-09-21 DIAGNOSIS — E78.5 DYSLIPIDEMIA: ICD-10-CM

## 2018-09-21 DIAGNOSIS — G44.209 TENSION HEADACHE: Primary | ICD-10-CM

## 2018-09-21 LAB
CHOLEST SERPL-MCNC: 181 MG/DL (ref 110–200)
HDLC SERPL-MCNC: 38 MG/DL
LDLC SERPL CALC-MCNC: 81 MG/DL (ref 0–99)
NONHDLC SERPL-MCNC: 143 MG/DL
TRIGL SERPL-MCNC: 308 MG/DL (ref 1–149)

## 2018-09-21 PROCEDURE — 80061 LIPID PANEL: CPT

## 2018-09-21 PROCEDURE — 36415 COLL VENOUS BLD VENIPUNCTURE: CPT

## 2018-09-21 PROCEDURE — 99212 OFFICE O/P EST SF 10 MIN: CPT | Performed by: FAMILY MEDICINE

## 2018-09-21 PROCEDURE — 90471 IMMUNIZATION ADMIN: CPT | Performed by: FAMILY MEDICINE

## 2018-09-21 PROCEDURE — 99213 OFFICE O/P EST LOW 20 MIN: CPT | Performed by: FAMILY MEDICINE

## 2018-09-21 PROCEDURE — 90686 IIV4 VACC NO PRSV 0.5 ML IM: CPT | Performed by: FAMILY MEDICINE

## 2018-09-21 RX ORDER — CELECOXIB 200 MG/1
200 CAPSULE ORAL 2 TIMES DAILY
Qty: 30 CAPSULE | Refills: 0 | Status: SHIPPED | OUTPATIENT
Start: 2018-09-21 | End: 2019-02-19

## 2018-09-21 NOTE — PROGRESS NOTES
2 weeks of headaches  Back of neck  noneck pain  Ibuprofen helps  Happens a day. Sugar has been ok  And blood pressure has been ok. Does snore  Doesn't have a lot of sleepiness during the day.     Neck good rom no pint tenderness  Pain over occipital

## 2018-09-23 ENCOUNTER — TELEPHONE (OUTPATIENT)
Dept: ENDOCRINOLOGY CLINIC | Facility: CLINIC | Age: 53
End: 2018-09-23

## 2018-09-23 DIAGNOSIS — E78.00 HYPERCHOLESTEROLEMIA: Primary | ICD-10-CM

## 2018-09-23 NOTE — TELEPHONE ENCOUNTER
Reviewed cholesterol labs  LDL is normal  TG are elevated. Still in the 300s like before  He should continue to work on a low fat diet.    High TG over 500 increase risk of pancreatitis  Lease review symptoms ( back/ abdominal pain nuase, vomiting, jaundi

## 2018-09-24 NOTE — TELEPHONE ENCOUNTER
Spoke with patient and discussed results below. Understands to continue to work on low fat diet. Orders placed to repeat levels before next appt and helped him schedule visit for December.

## 2018-11-26 RX ORDER — EMPAGLIFLOZIN 25 MG/1
TABLET, FILM COATED ORAL
Qty: 30 TABLET | Refills: 0 | Status: SHIPPED | OUTPATIENT
Start: 2018-11-26 | End: 2018-12-28

## 2018-12-07 ENCOUNTER — OFFICE VISIT (OUTPATIENT)
Dept: ENDOCRINOLOGY CLINIC | Facility: CLINIC | Age: 53
End: 2018-12-07
Payer: COMMERCIAL

## 2018-12-07 VITALS
SYSTOLIC BLOOD PRESSURE: 123 MMHG | HEIGHT: 71 IN | BODY MASS INDEX: 26.97 KG/M2 | HEART RATE: 79 BPM | DIASTOLIC BLOOD PRESSURE: 83 MMHG | WEIGHT: 192.63 LBS

## 2018-12-07 DIAGNOSIS — E11.9 TYPE 2 DIABETES MELLITUS WITHOUT COMPLICATION, WITHOUT LONG-TERM CURRENT USE OF INSULIN (HCC): ICD-10-CM

## 2018-12-07 DIAGNOSIS — E78.1 HYPERTRIGLYCERIDEMIA: Primary | ICD-10-CM

## 2018-12-07 PROCEDURE — 82962 GLUCOSE BLOOD TEST: CPT | Performed by: INTERNAL MEDICINE

## 2018-12-07 PROCEDURE — 99212 OFFICE O/P EST SF 10 MIN: CPT | Performed by: INTERNAL MEDICINE

## 2018-12-07 PROCEDURE — 36416 COLLJ CAPILLARY BLOOD SPEC: CPT | Performed by: INTERNAL MEDICINE

## 2018-12-07 PROCEDURE — 99213 OFFICE O/P EST LOW 20 MIN: CPT | Performed by: INTERNAL MEDICINE

## 2018-12-07 PROCEDURE — 83036 HEMOGLOBIN GLYCOSYLATED A1C: CPT | Performed by: INTERNAL MEDICINE

## 2018-12-07 NOTE — PROGRESS NOTES
Return Office Visit     CHIEF COMPLAINT:    DM   Hypertriglyceridemia    HISTORY OF PRESENT ILLNESS:  Jailene Francois is a 47year old male who presents for follow up for for DM.      DM HISTORY:  Diagnosed: Around age 29      HISTORY OF DIABETES C social history marked as reviewed.     ASSESSMENTS:       REVIEW OF SYSTEMS:  Constitutional: Negative for: weight change, fever, fatigue, cold/heat intolerance  Eyes: Negative for:  Visual changes, proptosis, blurring  ENT: Negative for:  dysphagia, neck s Patient understands the importance of glycemic control and the implications of uncontrolled diabetes including Diabetic ketoacidosis and various micro vascular and macrovascular complications. Goal a1c: < 7 %   Goal Fasting, Goal pre meal:   a).  Catrnia Kohler

## 2018-12-28 RX ORDER — EMPAGLIFLOZIN 25 MG/1
TABLET, FILM COATED ORAL
Qty: 30 TABLET | Refills: 2 | Status: SHIPPED | OUTPATIENT
Start: 2018-12-28 | End: 2019-04-03

## 2018-12-28 RX ORDER — LISINOPRIL 2.5 MG/1
TABLET ORAL
Qty: 90 TABLET | Refills: 0 | Status: SHIPPED | OUTPATIENT
Start: 2018-12-28 | End: 2019-03-20

## 2019-01-12 ENCOUNTER — TELEPHONE (OUTPATIENT)
Dept: FAMILY MEDICINE CLINIC | Facility: CLINIC | Age: 54
End: 2019-01-12

## 2019-01-12 NOTE — TELEPHONE ENCOUNTER
Pt called in was off work 3 days with cough and he need a note to return to work    Pt would like to  note no later then Monday

## 2019-01-12 NOTE — TELEPHONE ENCOUNTER
Pt called in said he was off work 3 days with cough.   Pt need note to return to work and would like to pick it up in [facility] no later then Monday    Pt said he has Caden

## 2019-01-12 NOTE — TELEPHONE ENCOUNTER
Spoke with pt. C/o cough x 3 days for which he stayed home from work. Denies CP/SOB. Denies fever, body aches or chills. Requesting note for work for 01/09-01/12. Aia 16 please advise.

## 2019-01-31 NOTE — TELEPHONE ENCOUNTER
Fax received at [facility] requesting refill.        Current Outpatient Medications:  METFORMIN HCL 1000 MG Oral Tab TOME 1 TABLETA POR LA BOCA  DOS VECES AL RADHA CON LAS  COMIDAS Disp: 180 tablet Rfl: 0

## 2019-02-01 ENCOUNTER — TELEPHONE (OUTPATIENT)
Dept: ENDOCRINOLOGY CLINIC | Facility: CLINIC | Age: 54
End: 2019-02-01

## 2019-02-01 NOTE — TELEPHONE ENCOUNTER
RN=please call patient and verify if he is taking Metformin, last refill was 7/16/18 with 3 months supply only and seeing Dr Pleitez Head as well. [employer] message sent today.

## 2019-02-01 NOTE — TELEPHONE ENCOUNTER
With Language Line assistance Nasim Dorsey, [id number] [facility], transfer to triage.

## 2019-02-01 NOTE — TELEPHONE ENCOUNTER
Pt requesting refill for rx: METFORMIN, to be sent to [facility] mail order, thanks.     Current Outpatient Medications:   •  METFORMIN HCL 1000 MG Oral Tab, TOME 1 TABLETA POR LA BOCA  DOS VECES AL RADHA CON LAS  COMIDAS, Disp: 180 tablet, Rfl: 0

## 2019-02-04 NOTE — TELEPHONE ENCOUNTER
Refill sent in 2/1/19 by Dr Guille Patel to [facility].     MetFORMIN HCl 1000 MG Oral Tab 180 tablet 0 2/1/2019     Sig: TOME 1 TABLETA POR LA BOCA  DOS VECES AL RADHA CON LAS  COMIDAS    Sent to pharmacy as: MetFORMIN HCl 1000 MG Oral Tablet    E-Prescribing Stat

## 2019-02-11 ENCOUNTER — NURSE TRIAGE (OUTPATIENT)
Dept: OTHER | Age: 54
End: 2019-02-11

## 2019-02-11 NOTE — TELEPHONE ENCOUNTER
Action Requested: Summary for Provider     []  Critical Lab, Recommendations Needed  [] Need Additional Advice  [x]   FYI    []   Need Orders  [] Need Medications Sent to Pharmacy  []  Other     SUMMARY: Patient calling with complaint of back pain rated a

## 2019-02-15 ENCOUNTER — HOSPITAL ENCOUNTER (OUTPATIENT)
Dept: GENERAL RADIOLOGY | Age: 54
Discharge: HOME | End: 2019-02-15
Attending: FAMILY MEDICINE
Payer: COMMERCIAL

## 2019-02-15 ENCOUNTER — OFFICE VISIT (OUTPATIENT)
Dept: FAMILY MEDICINE CLINIC | Facility: CLINIC | Age: 54
End: 2019-02-15
Payer: COMMERCIAL

## 2019-02-15 VITALS
HEART RATE: 69 BPM | BODY MASS INDEX: 27.35 KG/M2 | DIASTOLIC BLOOD PRESSURE: 74 MMHG | WEIGHT: 191 LBS | HEIGHT: 70 IN | TEMPERATURE: 97 F | SYSTOLIC BLOOD PRESSURE: 133 MMHG

## 2019-02-15 DIAGNOSIS — M54.50 CHRONIC RIGHT-SIDED LOW BACK PAIN WITHOUT SCIATICA: ICD-10-CM

## 2019-02-15 DIAGNOSIS — R05.9 COUGH: ICD-10-CM

## 2019-02-15 DIAGNOSIS — M54.50 CHRONIC RIGHT-SIDED LOW BACK PAIN WITHOUT SCIATICA: Primary | ICD-10-CM

## 2019-02-15 DIAGNOSIS — G89.29 CHRONIC RIGHT-SIDED LOW BACK PAIN WITHOUT SCIATICA: ICD-10-CM

## 2019-02-15 DIAGNOSIS — G89.29 CHRONIC RIGHT-SIDED LOW BACK PAIN WITHOUT SCIATICA: Primary | ICD-10-CM

## 2019-02-15 DIAGNOSIS — E11.9 DIABETES MELLITUS TYPE 2 IN NONOBESE (HCC): ICD-10-CM

## 2019-02-15 PROCEDURE — 99214 OFFICE O/P EST MOD 30 MIN: CPT | Performed by: FAMILY MEDICINE

## 2019-02-15 PROCEDURE — 99212 OFFICE O/P EST SF 10 MIN: CPT | Performed by: FAMILY MEDICINE

## 2019-02-15 PROCEDURE — 72110 X-RAY EXAM L-2 SPINE 4/>VWS: CPT | Performed by: FAMILY MEDICINE

## 2019-02-15 PROCEDURE — 71046 X-RAY EXAM CHEST 2 VIEWS: CPT | Performed by: FAMILY MEDICINE

## 2019-02-15 RX ORDER — IBUPROFEN 800 MG/1
TABLET ORAL
Refills: 0 | COMMUNITY
Start: 2019-01-09 | End: 2020-05-05

## 2019-02-15 RX ORDER — PROMETHAZINE HYDROCHLORIDE AND CODEINE PHOSPHATE 6.25; 1 MG/5ML; MG/5ML
5 SYRUP ORAL EVERY 6 HOURS PRN
Qty: 180 ML | Refills: 0 | Status: SHIPPED | OUTPATIENT
Start: 2019-02-15 | End: 2019-02-19

## 2019-02-15 RX ORDER — AZITHROMYCIN 250 MG/1
TABLET, FILM COATED ORAL
Qty: 6 TABLET | Refills: 0 | Status: SHIPPED | OUTPATIENT
Start: 2019-02-15 | End: 2019-02-19

## 2019-02-15 RX ORDER — AZITHROMYCIN 250 MG/1
TABLET, FILM COATED ORAL
Qty: 6 TABLET | Refills: 0 | Status: SHIPPED | OUTPATIENT
Start: 2019-02-15 | End: 2019-02-15

## 2019-02-15 NOTE — PROGRESS NOTES
Had for 3 years  Rt lower back  Went to therapy about 6 visit  At different places  Whenever he bends to left has pain in lower back  Uses a massage machine at home.     Has cough   4 days bad but had for 3 weeks     No fever   No sob   No chest pain   No n

## 2019-02-19 ENCOUNTER — OFFICE VISIT (OUTPATIENT)
Dept: NEUROLOGY | Facility: CLINIC | Age: 54
End: 2019-02-19
Payer: COMMERCIAL

## 2019-02-19 ENCOUNTER — TELEPHONE (OUTPATIENT)
Dept: NEUROLOGY | Facility: CLINIC | Age: 54
End: 2019-02-19

## 2019-02-19 VITALS
DIASTOLIC BLOOD PRESSURE: 72 MMHG | BODY MASS INDEX: 27.2 KG/M2 | HEIGHT: 70 IN | WEIGHT: 190 LBS | SYSTOLIC BLOOD PRESSURE: 112 MMHG | HEART RATE: 68 BPM

## 2019-02-19 DIAGNOSIS — M53.3 SACROILIAC JOINT DYSFUNCTION OF RIGHT SIDE: Primary | ICD-10-CM

## 2019-02-19 DIAGNOSIS — M54.59 MECHANICAL LOW BACK PAIN: ICD-10-CM

## 2019-02-19 PROCEDURE — 99244 OFF/OP CNSLTJ NEW/EST MOD 40: CPT | Performed by: PHYSICAL MEDICINE & REHABILITATION

## 2019-02-19 NOTE — PATIENT INSTRUCTIONS
1) We will call you to schedule the RIGHT SI joint injection as soon as its approved by insurance  2) If no improvement with SI joint injection, we can try a RIGHT L4-L5 and L5-S1 facet injection  3) Start pelvic floor physical therapy.     Coby Lang PT,

## 2019-02-19 NOTE — H&P
[facility] [address]  [address] [facility] H&P    Requesting Physician: Dina Mackenzie.  Karenoc Serna DO    Chief Complaint (Reason for Visit):  Patient presents with:  Low Back Pain: Patient presents today c/o pain across lower back, starte 2005        Years since quittin.7      Smokeless tobacco: Never Used    Substance and Sexual Activity      Alcohol use:  Yes        Alcohol/week: 7.2 oz        Types: 12 Cans of beer per week      Drug use: No      Sexual activity: Not on file Atraumatic  Eyes: Extra-occular movements intact. Ears: No auricular hematoma or deformities  Mouth: No lesions or ulcerations  Heart: peripheral pulses intact. Normal capillary refill.    Lungs: Non-labored respirations  Abdomen: No abdominal guarding  E 9/30/20  Date Final   • GLUCOSE 12/07/2018 143   Final   • Test Strip Lot # 12/07/2018 164,[id number]  Numeric Final   • Test Strip Expiration Date 12/07/2018 9/30/19  Date Final   Appointment on 09/21/2018   Component Date Value Ref Range Status   • HDL Choleste 3/21/2017, 10:22. INDICATIONS: Cough with shortness of breath x3 weeks. TECHNIQUE:   Two views. FINDINGS:   CARDIAC/VASC: No cardiac silhouette abnormality or cardiomegaly. Unremarkable pulmonary vasculature.     MEDIAST/BRIAN: No visible mass diagnosis)  Mechanical low back pain    Ace Zazueta MD, [address] [facility]  Physical Medicine and Rehabilitation/Sports Medicine  MEDICAL [facility]

## 2019-02-19 NOTE — TELEPHONE ENCOUNTER
Called  for authorization of approval of RIGHT SI joint injection cpt codes 34072, [id number]. Talked to Jorge L Perry. who states no authprization is required. Reference # [id number]. Will inform Nursing. Lucas Tejada

## 2019-02-26 ENCOUNTER — OFFICE VISIT (OUTPATIENT)
Dept: NEUROLOGY | Facility: CLINIC | Age: 54
End: 2019-02-26
Payer: COMMERCIAL

## 2019-02-26 VITALS
BODY MASS INDEX: 27.2 KG/M2 | WEIGHT: 190 LBS | DIASTOLIC BLOOD PRESSURE: 70 MMHG | SYSTOLIC BLOOD PRESSURE: 104 MMHG | HEART RATE: 78 BPM | RESPIRATION RATE: 16 BRPM | HEIGHT: 70 IN

## 2019-02-26 DIAGNOSIS — M53.3 SACROILIAC JOINT DYSFUNCTION OF RIGHT SIDE: Primary | ICD-10-CM

## 2019-02-26 NOTE — PROGRESS NOTES
Patient presented for right SI joint injection under ultrasound guidance. Ultrasound machine not working appropriately, therefore procedure was canceled for today. We will attempt a procedure on Friday, March 1, 2019 again. Latricia Cope.  Mohsen Block MD, [facility] &

## 2019-03-01 ENCOUNTER — OFFICE VISIT (OUTPATIENT)
Dept: NEUROLOGY | Facility: CLINIC | Age: 54
End: 2019-03-01
Payer: COMMERCIAL

## 2019-03-01 VITALS — DIASTOLIC BLOOD PRESSURE: 84 MMHG | RESPIRATION RATE: 16 BRPM | HEART RATE: 67 BPM | SYSTOLIC BLOOD PRESSURE: 126 MMHG

## 2019-03-01 DIAGNOSIS — M53.3 SACROILIAC JOINT DYSFUNCTION OF RIGHT SIDE: Primary | ICD-10-CM

## 2019-03-01 PROCEDURE — 76942 ECHO GUIDE FOR BIOPSY: CPT | Performed by: PHYSICAL MEDICINE & REHABILITATION

## 2019-03-01 PROCEDURE — 20552 NJX 1/MLT TRIGGER POINT 1/2: CPT | Performed by: PHYSICAL MEDICINE & REHABILITATION

## 2019-03-01 RX ORDER — LIDOCAINE HYDROCHLORIDE 10 MG/ML
3 INJECTION, SOLUTION INFILTRATION; PERINEURAL ONCE
Status: COMPLETED | OUTPATIENT
Start: 2019-03-01 | End: 2019-03-01

## 2019-03-01 RX ORDER — LIDOCAINE HYDROCHLORIDE 10 MG/ML
5 INJECTION, SOLUTION INFILTRATION; PERINEURAL ONCE
Status: COMPLETED | OUTPATIENT
Start: 2019-03-01 | End: 2019-03-01

## 2019-03-01 RX ORDER — TRIAMCINOLONE ACETONIDE 40 MG/ML
40 INJECTION, SUSPENSION INTRA-ARTICULAR; INTRAMUSCULAR ONCE
Status: COMPLETED | OUTPATIENT
Start: 2019-03-01 | End: 2019-03-01

## 2019-03-01 RX ADMIN — LIDOCAINE HYDROCHLORIDE 5 ML: 10 INJECTION, SOLUTION INFILTRATION; PERINEURAL at 12:12:00

## 2019-03-01 RX ADMIN — TRIAMCINOLONE ACETONIDE 40 MG: 40 INJECTION, SUSPENSION INTRA-ARTICULAR; INTRAMUSCULAR at 12:14:00

## 2019-03-01 RX ADMIN — LIDOCAINE HYDROCHLORIDE 3 ML: 10 INJECTION, SOLUTION INFILTRATION; PERINEURAL at 12:09:00

## 2019-03-01 NOTE — PROCEDURES
[address]   Sacroiliac Joint Injection Procedure Note    CHIEF COMPLAINT:  Patient presents with:   Injection: RIGHT SI joint injection       PROCEDURE PERFORMED:  RIGHT Sacroiliac joint corticosteroid injec RIGHT sacroiliac joint. Aspiration was attempted. There was no fluid able to be aspirated. A mixture of 3 cc of 1% lidocaine and 1 cc of Kenalog containing 40 mg of corticosteroid was injected into the intra-articular space.   The fluid was seen flowing i

## 2019-03-04 ENCOUNTER — MED REC SCAN ONLY (OUTPATIENT)
Dept: NEUROLOGY | Facility: CLINIC | Age: 54
End: 2019-03-04

## 2019-03-20 RX ORDER — LISINOPRIL 2.5 MG/1
TABLET ORAL
Qty: 90 TABLET | Refills: 0 | Status: SHIPPED | OUTPATIENT
Start: 2019-03-20 | End: 2019-08-06

## 2019-03-26 NOTE — TELEPHONE ENCOUNTER
•  MetFORMIN HCl 1000 MG Oral Tab, TOME 1 TABLETA POR LA BOCA  DOS VECES AL RADHA CON LAS  COMIDAS, Disp: 180 tablet, Rfl:

## 2019-04-03 RX ORDER — EMPAGLIFLOZIN 25 MG/1
TABLET, FILM COATED ORAL
Qty: 30 TABLET | Refills: 0 | Status: SHIPPED | OUTPATIENT
Start: 2019-04-03 | End: 2019-05-01

## 2019-04-05 ENCOUNTER — OFFICE VISIT (OUTPATIENT)
Dept: FAMILY MEDICINE CLINIC | Facility: CLINIC | Age: 54
End: 2019-04-05
Payer: COMMERCIAL

## 2019-04-05 VITALS
BODY MASS INDEX: 26.92 KG/M2 | HEART RATE: 68 BPM | WEIGHT: 188 LBS | HEIGHT: 70 IN | SYSTOLIC BLOOD PRESSURE: 137 MMHG | DIASTOLIC BLOOD PRESSURE: 90 MMHG

## 2019-04-05 DIAGNOSIS — M54.50 ACUTE LEFT-SIDED LOW BACK PAIN WITHOUT SCIATICA: ICD-10-CM

## 2019-04-05 DIAGNOSIS — N52.8 OTHER MALE ERECTILE DYSFUNCTION: Primary | ICD-10-CM

## 2019-04-05 PROCEDURE — 99214 OFFICE O/P EST MOD 30 MIN: CPT | Performed by: NURSE PRACTITIONER

## 2019-04-05 RX ORDER — TADALAFIL 10 MG/1
TABLET ORAL
Qty: 15 TABLET | Refills: 3 | Status: SHIPPED | OUTPATIENT
Start: 2019-04-05 | End: 2020-05-05

## 2019-04-05 RX ORDER — TRAMADOL HYDROCHLORIDE 50 MG/1
50 TABLET ORAL EVERY 6 HOURS PRN
Qty: 30 TABLET | Refills: 1 | Status: SHIPPED | OUTPATIENT
Start: 2019-04-05 | End: 2019-07-13

## 2019-04-05 NOTE — PROGRESS NOTES
HPI  Pt here for left low back pain-pain is chronic on   Right side and has seen Dr Lorena Lewis right injection and pain has improved. Now having pain on left side. Hurts deep in back with any twisting or bending. Denies radiation of pain. No known injury. Medical: Not on file        Non-medical: Not on file    Tobacco Use      Smoking status: Former Smoker        Quit date: 2005        Years since quittin.9      Smokeless tobacco: Never Used    Substance and Sexual Activity      Alcohol use: Liraglutide (VICTOZA) 18 MG/3ML Subcutaneous Solution Pen-injector INYECTE 1.8MG DEBAJO DE LA  PIEL DIARIAMENTE Disp: 27 mL Rfl: 0   JARDIANCE 25 MG Oral Tab TAKE 1 TABLET BY MOUTH EVERY DAY Disp: 30 tablet Rfl: 0   metFORMIN HCl 1000 MG Oral Tab TOME 1 TA Tadalafil (CIALIS) 10 MG Oral Tab                  Discussed plan of care with pt and pt is in agreement. All questions answered. Pt to call with questions or concerns. Encouraged to sign up for My Chart if not already registered.

## 2019-04-05 NOTE — ASSESSMENT & PLAN NOTE
Tramadol 50 mg I po qid prn pain    Contacted Dr Natalia Rubio office and appt made for Tuesday 4/9/19 at 9:00 am

## 2019-04-09 ENCOUNTER — TELEPHONE (OUTPATIENT)
Dept: NEUROLOGY | Facility: CLINIC | Age: 54
End: 2019-04-09

## 2019-04-09 ENCOUNTER — OFFICE VISIT (OUTPATIENT)
Dept: NEUROLOGY | Facility: CLINIC | Age: 54
End: 2019-04-09
Payer: COMMERCIAL

## 2019-04-09 VITALS
DIASTOLIC BLOOD PRESSURE: 66 MMHG | HEIGHT: 71 IN | HEART RATE: 72 BPM | BODY MASS INDEX: 26.6 KG/M2 | RESPIRATION RATE: 17 BRPM | WEIGHT: 190 LBS | SYSTOLIC BLOOD PRESSURE: 122 MMHG

## 2019-04-09 DIAGNOSIS — M53.3 SACROILIAC JOINT DYSFUNCTION OF LEFT SIDE: ICD-10-CM

## 2019-04-09 DIAGNOSIS — M79.18 MYOFASCIAL PAIN: Primary | ICD-10-CM

## 2019-04-09 PROCEDURE — 99214 OFFICE O/P EST MOD 30 MIN: CPT | Performed by: PHYSICAL MEDICINE & REHABILITATION

## 2019-04-09 RX ORDER — NAPROXEN 500 MG/1
500 TABLET ORAL 2 TIMES DAILY WITH MEALS
Qty: 60 TABLET | Refills: 0 | Status: SHIPPED | OUTPATIENT
Start: 2019-04-09 | End: 2019-07-13

## 2019-04-09 NOTE — PATIENT INSTRUCTIONS
1) We will call you when the LEFT SI joint injection is approved by insurance.  Hopefully we can perform the injection today or tomorrow (4/9 or 4/10) but we must wait for approval first  2) Take Naprosyn 500 mg 1 tablet twice per day with food for the next

## 2019-04-09 NOTE — PROGRESS NOTES
[address]  Progress Note    CHIEF COMPLAINT:  Patient presents with:  Low Back Pain: LOV 03/01/19 Pt states that he got 100% relief on the right side but now that pain is in the same area on the left side. Father    • Cancer Mother         stomach cancer at 80       CURRENT MEDICATIONS:     Current Outpatient Medications:  naproxen (NAPROSYN) 500 MG Oral Tab Take 1 tablet (500 mg total) by mouth 2 (two) times daily with meals.  Take for 2 weeks as directed an Normocephalic/ Atraumatic  Eyes: Extra-occular movements intact. Ears: No auricular hematoma or deformities  Mouth: No lesions or ulcerations  Heart: peripheral pulses intact. Normal capillary refill.    Lungs: Non-labored respirations  Abdomen: No abdomi X-ray of the lumbar spine from 2/15/2019  XR LUMBAR SPINE (MIN 4 VIEWS) (CPT=72110)  Narrative: PROCEDURE: XR LUMBAR SPINE (MIN 4 VIEWS) (CPT=72110)     COMPARISON: None available. INDICATIONS: Worsening lower back pain ongoing for 2 years.      TECHNIQ abnormality.         Dictated by (CST): Raymond Lopez MD on 2/15/2019 at 15:54       Approved by (CST): Raymond Lopez MD on 2/15/2019 at 15:54              ASSESSMENT AND PLAN:  The patient is a 60-year-old male who is coming in for follow-up of his right SI

## 2019-04-09 NOTE — TELEPHONE ENCOUNTER
Called [facility] for authorization of approval of LEFT SI joint injection under ultrasound guidance cpt codes , 81432,. Talked to Shin Rodas. who initiated  request. Reference # [id number], Will fax clinical note.  Faxed note pending approval.

## 2019-04-10 NOTE — TELEPHONE ENCOUNTER
Pt calling stating he talked to insurance and they told him no approval necessary for injection. Pt also states that insurance shows that we have not contacted them.

## 2019-04-16 ENCOUNTER — TELEPHONE (OUTPATIENT)
Dept: NEUROLOGY | Facility: CLINIC | Age: 54
End: 2019-04-16

## 2019-05-01 ENCOUNTER — TELEPHONE (OUTPATIENT)
Dept: ENDOCRINOLOGY CLINIC | Facility: CLINIC | Age: 54
End: 2019-05-01

## 2019-05-02 RX ORDER — EMPAGLIFLOZIN 25 MG/1
TABLET, FILM COATED ORAL
Qty: 90 TABLET | Refills: 0 | Status: SHIPPED | OUTPATIENT
Start: 2019-05-02 | End: 2019-08-07

## 2019-05-03 NOTE — TELEPHONE ENCOUNTER
Called [facility] for authorization of approval of LEFT SI joint injection under ultrasound guidance cpt codes 99574, 95988,. Reference # [id number] T/t     Roly . who states it is still pending. [facility] will call me on 05/06/19 with outcome.

## 2019-05-08 NOTE — TELEPHONE ENCOUNTER
Called [facility] for authorization of approval of LEFT SI joint injection under ultrasound guidance cpt codes 90855, 79225,. Reference # [id number]. T/t Molly Christensen. who states it was approved. Authorization # [id number] for one unit/DOS effective 05/09/19 to 06/06/19.

## 2019-06-04 ENCOUNTER — OFFICE VISIT (OUTPATIENT)
Dept: NEUROLOGY | Facility: CLINIC | Age: 54
End: 2019-06-04
Payer: COMMERCIAL

## 2019-06-04 VITALS — HEART RATE: 80 BPM | DIASTOLIC BLOOD PRESSURE: 80 MMHG | SYSTOLIC BLOOD PRESSURE: 122 MMHG

## 2019-06-04 DIAGNOSIS — M53.3 SACROILIAC JOINT DYSFUNCTION OF RIGHT SIDE: ICD-10-CM

## 2019-06-04 DIAGNOSIS — M79.10 MYALGIA: Primary | ICD-10-CM

## 2019-06-04 DIAGNOSIS — M53.3 SACROILIAC JOINT DYSFUNCTION OF LEFT SIDE: ICD-10-CM

## 2019-06-04 PROCEDURE — 20552 NJX 1/MLT TRIGGER POINT 1/2: CPT | Performed by: PHYSICAL MEDICINE & REHABILITATION

## 2019-06-04 PROCEDURE — 76942 ECHO GUIDE FOR BIOPSY: CPT | Performed by: PHYSICAL MEDICINE & REHABILITATION

## 2019-06-04 RX ADMIN — LIDOCAINE HYDROCHLORIDE 2 ML: 10 INJECTION, SOLUTION INFILTRATION; PERINEURAL at 13:26:00

## 2019-06-04 RX ADMIN — LIDOCAINE HYDROCHLORIDE 2 ML: 10 INJECTION, SOLUTION INFILTRATION; PERINEURAL at 13:27:00

## 2019-06-04 NOTE — PROCEDURES
[address]   Sacroiliac Joint Injection Procedure Note    CHIEF COMPLAINT:  Patient presents with:   Injection: Left SI Joint Injection       PROCEDURE PERFORMED:  LEFT Sacroiliac joint and surrounding myofas flowing into the intra-articular region. The needle was then removed and hemostasis obtained. The patient tolerated the procedure well and was able to report that the pain decreased from a 8/10 preprocedure to a 0/10  postprocedure in the SI joint.

## 2019-06-05 RX ORDER — LIDOCAINE HYDROCHLORIDE 10 MG/ML
2 INJECTION, SOLUTION INFILTRATION; PERINEURAL ONCE
Status: COMPLETED | OUTPATIENT
Start: 2019-06-05 | End: 2019-06-04

## 2019-06-05 RX ORDER — TRIAMCINOLONE ACETONIDE 40 MG/ML
40 INJECTION, SUSPENSION INTRA-ARTICULAR; INTRAMUSCULAR ONCE
Status: COMPLETED | OUTPATIENT
Start: 2019-06-05 | End: 2019-06-05

## 2019-06-05 RX ADMIN — TRIAMCINOLONE ACETONIDE 40 MG: 40 INJECTION, SUSPENSION INTRA-ARTICULAR; INTRAMUSCULAR at 13:28:00

## 2019-06-06 ENCOUNTER — MED REC SCAN ONLY (OUTPATIENT)
Dept: NEUROLOGY | Facility: CLINIC | Age: 54
End: 2019-06-06

## 2019-06-18 ENCOUNTER — TELEPHONE (OUTPATIENT)
Dept: ENDOCRINOLOGY CLINIC | Facility: CLINIC | Age: 54
End: 2019-06-18

## 2019-06-18 RX ORDER — PEN NEEDLE, DIABETIC 31 GX5/16"
NEEDLE, DISPOSABLE MISCELLANEOUS
Qty: 90 EACH | Refills: 6 | Status: SHIPPED | OUTPATIENT
Start: 2019-06-18 | End: 2021-02-16 | Stop reason: ALTCHOICE

## 2019-06-21 ENCOUNTER — OFFICE VISIT (OUTPATIENT)
Dept: ENDOCRINOLOGY CLINIC | Facility: CLINIC | Age: 54
End: 2019-06-21
Payer: COMMERCIAL

## 2019-06-21 VITALS
DIASTOLIC BLOOD PRESSURE: 85 MMHG | HEART RATE: 73 BPM | SYSTOLIC BLOOD PRESSURE: 127 MMHG | WEIGHT: 190.63 LBS | BODY MASS INDEX: 27 KG/M2

## 2019-06-21 DIAGNOSIS — E11.9 TYPE 2 DIABETES MELLITUS WITHOUT COMPLICATION, WITHOUT LONG-TERM CURRENT USE OF INSULIN (HCC): Primary | ICD-10-CM

## 2019-06-21 DIAGNOSIS — E78.1 HYPERTRIGLYCERIDEMIA: ICD-10-CM

## 2019-06-21 PROCEDURE — 82962 GLUCOSE BLOOD TEST: CPT | Performed by: INTERNAL MEDICINE

## 2019-06-21 PROCEDURE — 83036 HEMOGLOBIN GLYCOSYLATED A1C: CPT | Performed by: INTERNAL MEDICINE

## 2019-06-21 PROCEDURE — 36416 COLLJ CAPILLARY BLOOD SPEC: CPT | Performed by: INTERNAL MEDICINE

## 2019-06-21 PROCEDURE — 99213 OFFICE O/P EST LOW 20 MIN: CPT | Performed by: INTERNAL MEDICINE

## 2019-06-21 NOTE — PROGRESS NOTES
Return Office Visit     CHIEF COMPLAINT:    DM   Hypertriglyceridemia    HISTORY OF PRESENT ILLNESS:  Braulio Magdaleno is a 47year old male who presents for follow up for for DM.      DM HISTORY:  Diagnosed: Around age 29      HISTORY OF DIABETES C Rfl: 1         ALLERGY:  No Known Allergies    PAST MEDICAL, SOCIAL AND FAMILY HISTORY:  See past medical history marked as reviewed. See past surgical history marked as reviewed. See past family history marked as reviewed.   See past social history annabelle two month and hence was eating more than usual and was not active. He has resumed work recently and is doing good with lifestyle changes.        Plan:  Discussed the pathogenesis, natural course of diabetes.  Patient understands the importance of glycemic c

## 2019-07-13 ENCOUNTER — OFFICE VISIT (OUTPATIENT)
Dept: NEUROLOGY | Facility: CLINIC | Age: 54
End: 2019-07-13
Payer: COMMERCIAL

## 2019-07-13 VITALS
SYSTOLIC BLOOD PRESSURE: 120 MMHG | DIASTOLIC BLOOD PRESSURE: 80 MMHG | HEIGHT: 71 IN | OXYGEN SATURATION: 98 % | HEART RATE: 61 BPM | BODY MASS INDEX: 26.6 KG/M2 | WEIGHT: 190 LBS

## 2019-07-13 DIAGNOSIS — M47.816 LUMBAR FACET JOINT SYNDROME: ICD-10-CM

## 2019-07-13 DIAGNOSIS — G89.29 CHRONIC LEFT-SIDED LOW BACK PAIN WITH LEFT-SIDED SCIATICA: Primary | ICD-10-CM

## 2019-07-13 DIAGNOSIS — M47.816 FACET SYNDROME, LUMBAR: ICD-10-CM

## 2019-07-13 DIAGNOSIS — M54.42 CHRONIC LEFT-SIDED LOW BACK PAIN WITH LEFT-SIDED SCIATICA: Primary | ICD-10-CM

## 2019-07-13 DIAGNOSIS — M54.16 LUMBAR RADICULOPATHY, ACUTE: ICD-10-CM

## 2019-07-13 DIAGNOSIS — M53.3 CHRONIC LEFT SI JOINT PAIN: ICD-10-CM

## 2019-07-13 DIAGNOSIS — M79.18 LEFT BUTTOCK PAIN: ICD-10-CM

## 2019-07-13 DIAGNOSIS — M54.59 LUMBAR TRIGGER POINT SYNDROME: ICD-10-CM

## 2019-07-13 DIAGNOSIS — G89.29 CHRONIC LEFT SI JOINT PAIN: ICD-10-CM

## 2019-07-13 PROCEDURE — 99214 OFFICE O/P EST MOD 30 MIN: CPT | Performed by: PHYSICAL MEDICINE & REHABILITATION

## 2019-07-13 NOTE — PROCEDURES
[address]   Sacroiliac Joint Injection Procedure Note    CHIEF COMPLAINT:  Patient presents with:   Injection: Pt her for injections, 0/10, getting up/sleeping,bending over 8/10, pt has no allergies or fever corticosteroid was injected into the intra-articular space. The fluid was seen flowing into the intra-articular region. The needle was then removed and hemostasis obtained.         The patient tolerated the procedure well and was able to report that the p

## 2019-07-13 NOTE — PATIENT INSTRUCTIONS
My office will call you once the MRI is approved by your insurance. You should then schedule the MRI and call my office again to make an appointment with me 2-3 days after your exam for review of your images and a further plan.  If your MRI is not being per

## 2019-07-13 NOTE — PROGRESS NOTES
[address]  Progress Note    CHIEF COMPLAINT:  Patient presents with: Injection: Pt her for injections, 0/10, getting up/sleeping,bending over 8/10, pt has no allergies or fever.       History of Present Ill DIARIO Disp: 90 each Rfl: 6   JARDIANCE 25 MG Oral Tab TAKE 1 TABLET BY MOUTH EVERY DAY Disp: 90 tablet Rfl: 0   naproxen (NAPROSYN) 500 MG Oral Tab Take 1 tablet (500 mg total) by mouth 2 (two) times daily with meals.  Take for 2 weeks as directed and then deformities  Mouth: No lesions or ulcerations  Heart: peripheral pulses intact. Normal capillary refill. Lungs: Non-labored respirations  Abdomen: No abdominal guarding  Extremities: No lower extremity edema bilaterally   Skin: No lesions noted.    [employer]ogn[employer] TECHNIQUE: Lumbar spine radiographs (minimum 4 views)       FINDINGS:   ALIGNMENT:   Right convexity of the lumbar spine is noted. The anatomic lumbar lordosis is preserved.   VERTEBRAL BODIES:   A total of 5 non-rib-bearing lumbar-type vertebral bodies a buttock and SI joint pain. As he does not improve after his diagnostic and therapeutic left SI joint injection in June 2019, I would like to order an MRI of the lumbar spine.   I believe he has a lumbar radiculopathy as he does have weakness in the L5 myot

## 2019-07-15 ENCOUNTER — TELEPHONE (OUTPATIENT)
Dept: NEUROLOGY | Facility: CLINIC | Age: 54
End: 2019-07-15

## 2019-07-15 NOTE — TELEPHONE ENCOUNTER
[employer] for authorization of approval for MRI L-spine wo cpt [id number]. Approval was given with Authorization # [id number] effective 07/15/19 to 08/13/19   Will call Pt. To inform. L/m advising of approval. Can proceed with scheduling appt.

## 2019-07-17 ENCOUNTER — HOSPITAL ENCOUNTER (OUTPATIENT)
Dept: MRI IMAGING | Facility: HOSPITAL | Age: 54
Discharge: HOME | End: 2019-07-17
Attending: PHYSICAL MEDICINE & REHABILITATION
Payer: COMMERCIAL

## 2019-07-17 DIAGNOSIS — M47.816 LUMBAR FACET JOINT SYNDROME: ICD-10-CM

## 2019-07-17 DIAGNOSIS — G89.29 CHRONIC LEFT SI JOINT PAIN: ICD-10-CM

## 2019-07-17 DIAGNOSIS — M54.42 CHRONIC LEFT-SIDED LOW BACK PAIN WITH LEFT-SIDED SCIATICA: ICD-10-CM

## 2019-07-17 DIAGNOSIS — M53.3 CHRONIC LEFT SI JOINT PAIN: ICD-10-CM

## 2019-07-17 DIAGNOSIS — G89.29 CHRONIC LEFT-SIDED LOW BACK PAIN WITH LEFT-SIDED SCIATICA: ICD-10-CM

## 2019-07-17 DIAGNOSIS — M79.18 LEFT BUTTOCK PAIN: ICD-10-CM

## 2019-07-17 PROCEDURE — 72148 MRI LUMBAR SPINE W/O DYE: CPT | Performed by: PHYSICAL MEDICINE & REHABILITATION

## 2019-07-23 RX ORDER — NAPROXEN 500 MG/1
TABLET ORAL
Qty: 60 TABLET | Refills: 0 | OUTPATIENT
Start: 2019-07-23

## 2019-08-06 RX ORDER — LISINOPRIL 2.5 MG/1
TABLET ORAL
Qty: 90 TABLET | Refills: 0 | Status: SHIPPED | OUTPATIENT
Start: 2019-08-06 | End: 2020-02-17

## 2019-08-07 RX ORDER — EMPAGLIFLOZIN 25 MG/1
TABLET, FILM COATED ORAL
Qty: 90 TABLET | Refills: 0 | Status: SHIPPED | OUTPATIENT
Start: 2019-08-07 | End: 2019-11-03

## 2019-08-22 NOTE — TELEPHONE ENCOUNTER
LMTCB Report recvd from off going RN, pt recvd sitting up on stretcher A&Ox4, dtr at bedside providing interpretation, pt eating a dinner tray, tolerating well, offers no complaints, POC discussed with pt and family, pt to be admitted to facility pending consult from admitting physician, pt and family verbalize understanding and agree with plan, pt safety and comfort maintained.

## 2019-09-09 ENCOUNTER — TELEPHONE (OUTPATIENT)
Dept: NEUROLOGY | Facility: CLINIC | Age: 54
End: 2019-09-09

## 2019-09-09 ENCOUNTER — OFFICE VISIT (OUTPATIENT)
Dept: NEUROLOGY | Facility: CLINIC | Age: 54
End: 2019-09-09
Payer: COMMERCIAL

## 2019-09-09 VITALS
RESPIRATION RATE: 17 BRPM | HEIGHT: 71 IN | HEART RATE: 75 BPM | DIASTOLIC BLOOD PRESSURE: 80 MMHG | SYSTOLIC BLOOD PRESSURE: 122 MMHG | BODY MASS INDEX: 26.6 KG/M2 | WEIGHT: 190 LBS

## 2019-09-09 DIAGNOSIS — M54.59 MECHANICAL LOW BACK PAIN: Primary | ICD-10-CM

## 2019-09-09 DIAGNOSIS — M51.26 LUMBAR DISC HERNIATION: ICD-10-CM

## 2019-09-09 DIAGNOSIS — M48.061 LUMBAR FORAMINAL STENOSIS: ICD-10-CM

## 2019-09-09 DIAGNOSIS — G89.29 CHRONIC LEFT-SIDED LOW BACK PAIN WITH LEFT-SIDED SCIATICA: ICD-10-CM

## 2019-09-09 DIAGNOSIS — M54.42 CHRONIC LEFT-SIDED LOW BACK PAIN WITH LEFT-SIDED SCIATICA: ICD-10-CM

## 2019-09-09 DIAGNOSIS — M54.16 LUMBAR RADICULOPATHY, ACUTE: ICD-10-CM

## 2019-09-09 PROCEDURE — 99214 OFFICE O/P EST MOD 30 MIN: CPT | Performed by: PHYSICAL MEDICINE & REHABILITATION

## 2019-09-09 NOTE — TELEPHONE ENCOUNTER
Faxed clinical notes to [facility] for authorization of  LEFT L4 and LEFT L5 TFESI cpt codes 95289,05705, pending approval

## 2019-09-09 NOTE — PROGRESS NOTES
[address]  Progress Note    CHIEF COMPLAINT:  Patient presents with:  Low Back Pain: LOV 07/13/19 Pt states that his back pain hasnt gone away       History of Present Illness:   The patient is a 49year old Current Outpatient Medications:  Liraglutide (VICTOZA) 18 MG/3ML Subcutaneous Solution Pen-injector INYECTE 1.8MG DEBAJO DE LA  PIEL DIARIAMENTE Disp: 27 mL Rfl: 0   JARDIANCE 25 MG Oral Tab TAKE 1 TABLET BY MOUTH EVERY DAY Disp: 90 tablet Rfl: 0   lis attentive, able to follow 2 step commands, comprehention intact, spontaneous speech intact      Motor:    Musculoskeletal:    LUMBAR SPINE:  Inspection: no erythema, swelling, or obvious deformity.   Their iliac crest and shoulder heights are symmetrical. EQUINA: The conus medullaris terminates at the level of the inferior L1 endplate. The distal cord and nerve roots have normal caliber, contour, and signal intensity. PARASPINAL AREA: No visible mass. OTHER: Negative.      LUMBAR DISC LEVELS:  L1-L2: No on 7/17/2019 at 9:58              ASSESSMENT AND PLAN:  The patient is a pleasant 58-year-old male is coming in complaining of bilateral (left greater than right) low back pain with radiation to the left buttock.   I do believe that he has a lumbar radiculo

## 2019-09-09 NOTE — PATIENT INSTRUCTIONS
1) My office will call you to schedule the LEFt L4 and LEFT L5 TFESI under local anesthesia once the procedure is approved by your insurance carrier. 2) Follow up with me 2 weeks after the injection.  If no improvement, we will perform a caudal epidura

## 2019-09-09 NOTE — TELEPHONE ENCOUNTER
Anton Adhikari for authorization of approval of LEFT L4 and LEFT L5 TFESI CPTcodes 88939,32187,19625. Spoke to Roojoom. who states authorization is required and will initiate. Will fax clinical notes-   Pending case # [id number].  pending approval.

## 2019-09-11 NOTE — TELEPHONE ENCOUNTER
Called [facility] for status on authorization of approval of LEFT L4 and LEFT L5 TFESI cpt codes 86426,12158 Spoke to  Tricia Mcgregor. who states the above procedure is APPROVED Authorization#[id number]. Will inform nursing.   Reference call# [email or web address] 4:50p

## 2019-09-12 ENCOUNTER — APPOINTMENT (OUTPATIENT)
Dept: LAB | Age: 54
End: 2019-09-12
Attending: INTERNAL MEDICINE
Payer: COMMERCIAL

## 2019-09-12 ENCOUNTER — OFFICE VISIT (OUTPATIENT)
Dept: SURGERY | Facility: CLINIC | Age: 54
End: 2019-09-12
Payer: COMMERCIAL

## 2019-09-12 ENCOUNTER — TELEPHONE (OUTPATIENT)
Dept: NEUROLOGY | Facility: CLINIC | Age: 54
End: 2019-09-12

## 2019-09-12 DIAGNOSIS — G89.29 CHRONIC LEFT-SIDED LOW BACK PAIN WITH LEFT-SIDED SCIATICA: ICD-10-CM

## 2019-09-12 DIAGNOSIS — E78.00 HYPERCHOLESTEROLEMIA: ICD-10-CM

## 2019-09-12 DIAGNOSIS — M48.061 LUMBAR FORAMINAL STENOSIS: ICD-10-CM

## 2019-09-12 DIAGNOSIS — M54.16 LUMBAR RADICULOPATHY, ACUTE: Primary | ICD-10-CM

## 2019-09-12 DIAGNOSIS — E11.9 TYPE 2 DIABETES MELLITUS WITHOUT COMPLICATION, WITHOUT LONG-TERM CURRENT USE OF INSULIN (HCC): ICD-10-CM

## 2019-09-12 DIAGNOSIS — E78.1 HYPERTRIGLYCERIDEMIA: ICD-10-CM

## 2019-09-12 DIAGNOSIS — M54.42 CHRONIC LEFT-SIDED LOW BACK PAIN WITH LEFT-SIDED SCIATICA: ICD-10-CM

## 2019-09-12 DIAGNOSIS — M51.26 LUMBAR DISC HERNIATION: ICD-10-CM

## 2019-09-12 LAB
ANION GAP SERPL CALC-SCNC: 8 MMOL/L (ref 0–18)
BUN BLD-MCNC: 14 MG/DL (ref 7–18)
BUN/CREAT SERPL: 14.9 (ref 10–20)
CALCIUM BLD-MCNC: 9.1 MG/DL (ref 8.5–10.1)
CHLORIDE SERPL-SCNC: 107 MMOL/L (ref 98–112)
CHOLEST SMN-MCNC: 168 MG/DL (ref ?–200)
CO2 SERPL-SCNC: 25 MMOL/L (ref 21–32)
CREAT BLD-MCNC: 0.94 MG/DL (ref 0.7–1.3)
CREAT UR-SCNC: 114 MG/DL
GLUCOSE BLD-MCNC: 117 MG/DL (ref 70–99)
HDLC SERPL-MCNC: 36 MG/DL (ref 40–59)
LDLC SERPL CALC-MCNC: 71 MG/DL (ref ?–100)
MICROALBUMIN UR-MCNC: 0.62 MG/DL
MICROALBUMIN/CREAT 24H UR-RTO: 5.4 UG/MG (ref ?–30)
NONHDLC SERPL-MCNC: 132 MG/DL (ref ?–130)
OSMOLALITY SERPL CALC.SUM OF ELEC: 292 MOSM/KG (ref 275–295)
PATIENT FASTING: YES
PATIENT FASTING: YES
POTASSIUM SERPL-SCNC: 4.2 MMOL/L (ref 3.5–5.1)
SODIUM SERPL-SCNC: 140 MMOL/L (ref 136–145)
TRIGL SERPL-MCNC: 307 MG/DL (ref 30–149)
VLDLC SERPL CALC-MCNC: 61 MG/DL (ref 0–30)

## 2019-09-12 PROCEDURE — 64483 NJX AA&/STRD TFRM EPI L/S 1: CPT | Performed by: PHYSICAL MEDICINE & REHABILITATION

## 2019-09-12 PROCEDURE — 36415 COLL VENOUS BLD VENIPUNCTURE: CPT

## 2019-09-12 PROCEDURE — 80048 BASIC METABOLIC PNL TOTAL CA: CPT

## 2019-09-12 PROCEDURE — 80061 LIPID PANEL: CPT

## 2019-09-12 PROCEDURE — 82570 ASSAY OF URINE CREATININE: CPT

## 2019-09-12 PROCEDURE — 82043 UR ALBUMIN QUANTITATIVE: CPT

## 2019-09-12 PROCEDURE — 64484 NJX AA&/STRD TFRM EPI L/S EA: CPT | Performed by: PHYSICAL MEDICINE & REHABILITATION

## 2019-09-12 NOTE — PROCEDURES
[address]    2-LEVEL LUMBAR TRANSFORAMINAL   NAME:  Yovani Quigley    MR #:    [id number] :  1965     PHYSICIAN:  Manolo Vale        Operative Report    DATE OF PROCEDURE: 2019   PREOPERATIVE DIAGNOSES: of 2 cc of 6 mg/cc of Betamethasone and 2 cc of 1% PF lidocaine without epinephrine at each site. After this, the needles were removed. Each site was cleaned. Band-Aids were applied. The patient was transferred to the cart and into [facility].   The patient wa

## 2019-09-12 NOTE — TELEPHONE ENCOUNTER
Patient has been scheduled for a LEFT L4 and LEFT L5 TFESI under local anesthesia   on 09/12/19 at the [facility]. Medications and allergies reviewed. Patient informed he will need a .  Patient informed not to eat or drink anything after midnight the night

## 2019-09-14 ENCOUNTER — TELEPHONE (OUTPATIENT)
Dept: ENDOCRINOLOGY CLINIC | Facility: CLINIC | Age: 54
End: 2019-09-14

## 2019-09-14 NOTE — TELEPHONE ENCOUNTER
Ur Ma normal  GFr normal  TG high in the 300s: work on low fat diet  Also, needs apt  Please book  First available is okay  Thanks

## 2019-09-18 NOTE — TELEPHONE ENCOUNTER
Patient informed of normal GFR and Ur MA and high TG. Informed to work on eating a low fat diet. Scheduled for 9/24. Enc closed.

## 2019-11-03 RX ORDER — EMPAGLIFLOZIN 25 MG/1
TABLET, FILM COATED ORAL
Qty: 90 TABLET | Refills: 0 | Status: CANCELLED | OUTPATIENT
Start: 2019-11-03

## 2019-11-04 RX ORDER — EMPAGLIFLOZIN 25 MG/1
TABLET, FILM COATED ORAL
Qty: 90 TABLET | Refills: 0 | Status: SHIPPED | OUTPATIENT
Start: 2019-11-04 | End: 2020-02-10

## 2019-11-05 ENCOUNTER — OFFICE VISIT (OUTPATIENT)
Dept: ENDOCRINOLOGY CLINIC | Facility: CLINIC | Age: 54
End: 2019-11-05
Payer: COMMERCIAL

## 2019-11-05 VITALS
HEART RATE: 68 BPM | SYSTOLIC BLOOD PRESSURE: 119 MMHG | BODY MASS INDEX: 28 KG/M2 | DIASTOLIC BLOOD PRESSURE: 77 MMHG | WEIGHT: 199.63 LBS

## 2019-11-05 DIAGNOSIS — E78.5 DYSLIPIDEMIA: ICD-10-CM

## 2019-11-05 DIAGNOSIS — E11.9 TYPE 2 DIABETES MELLITUS WITHOUT COMPLICATION, WITHOUT LONG-TERM CURRENT USE OF INSULIN (HCC): Primary | ICD-10-CM

## 2019-11-05 PROCEDURE — 82962 GLUCOSE BLOOD TEST: CPT | Performed by: INTERNAL MEDICINE

## 2019-11-05 PROCEDURE — 36416 COLLJ CAPILLARY BLOOD SPEC: CPT | Performed by: INTERNAL MEDICINE

## 2019-11-05 PROCEDURE — 99213 OFFICE O/P EST LOW 20 MIN: CPT | Performed by: INTERNAL MEDICINE

## 2019-11-05 PROCEDURE — 83036 HEMOGLOBIN GLYCOSYLATED A1C: CPT | Performed by: INTERNAL MEDICINE

## 2019-11-05 NOTE — PROGRESS NOTES
Return Office Visit     CHIEF COMPLAINT:    DM   Hypertriglyceridemia    HISTORY OF PRESENT ILLNESS:  Alonzo Mai is a 49year old male who presents for follow up for DM.      DM HISTORY:  Diagnosed: Around age 29      HISTORY OF DIABETE weight change, fever, fatigue, cold/heat intolerance  Eyes: Negative for:  Visual changes, proptosis, blurring  ENT: Negative for:  dysphagia, neck swelling, dysphonia  Respiratory: Negative for:  dyspnea, cough  Cardiovascular: Negative for:  chest pain, nausea, vomiting, diarrhea, pancreatitis, gastroparesis and rare side effect abdulkadir Abdirizak syndrome. -  Jardiance 25 mg daily  Discussed side effects including UTI and fungal infections.   Discussed importance of hydration  Discussed recent FDA data abo

## 2020-02-01 ENCOUNTER — PATIENT MESSAGE (OUTPATIENT)
Dept: ENDOCRINOLOGY CLINIC | Facility: CLINIC | Age: 55
End: 2020-02-01

## 2020-02-03 ENCOUNTER — PATIENT MESSAGE (OUTPATIENT)
Dept: ENDOCRINOLOGY CLINIC | Facility: CLINIC | Age: 55
End: 2020-02-03

## 2020-02-03 NOTE — TELEPHONE ENCOUNTER
Pt asking about once weekly injection similar to Victoza. Pt is Cypriot speaking, responded to pt in Cypriot via DAXKO. Will await response.

## 2020-02-03 NOTE — TELEPHONE ENCOUNTER
From: Jm Saleh  To: Rena Rao MD  Sent: 2/1/2020 11:53 AM CST  Subject: Prescription Question    Good morning  can you please order my try medicine similar to [address] [place] [address] but is one a week you and mi talk before tks

## 2020-02-03 NOTE — TELEPHONE ENCOUNTER
From: Jhony Campos  To: Donya Aburto MD  Sent: 2/3/2020 10:35 AM CST  Subject: Prescription Question    [address] red tks

## 2020-02-03 NOTE — TELEPHONE ENCOUNTER
LOV 11/05/2019    Pt requesting to try Trulicity as discussed at [facility]. Pended for provider. Pt also requesting Trulicity coupon. Ok to give coupon for 1 month free and savings card?

## 2020-02-04 ENCOUNTER — PATIENT MESSAGE (OUTPATIENT)
Dept: ENDOCRINOLOGY CLINIC | Facility: CLINIC | Age: 55
End: 2020-02-04

## 2020-02-04 NOTE — TELEPHONE ENCOUNTER
From: Edrick Councilman  To: Jose Alejandro De Anda MD  Sent: 2/4/2020 4:54 PM CST  Subject: Prescription Question    [facility]?

## 2020-02-04 NOTE — TELEPHONE ENCOUNTER
2020 coupons received for 1 month free of Trulicity - called pharmacy & gave RXBIN, PCN, AND GROUP. Went through for 1 month free. Will send [facility] msg to patient to inform him it went through.  Also will inform patient he needs to activate the savings ca

## 2020-02-04 NOTE — TELEPHONE ENCOUNTER
Spoke with pharmacist Ahmens - Trulicity 1 month free voucher did not go through. Called rep Awa Wild with [facility] - she states they have the new coupons with the expiration dates of 2020 and will stop by today and bring some.

## 2020-02-06 ENCOUNTER — TELEPHONE (OUTPATIENT)
Dept: ENDOCRINOLOGY CLINIC | Facility: CLINIC | Age: 55
End: 2020-02-06

## 2020-02-06 NOTE — TELEPHONE ENCOUNTER
Pharm calling to get clarification on Rx for Dulaglutide med Victoza. Pharm needs to know which med should pt continue?

## 2020-02-06 NOTE — TELEPHONE ENCOUNTER
LOV 7/3/18. No F/U scheduled. OK to refill 90 days per AM protocol.
Normal rate, regular rhythm, normal S1, S2 heart sounds heard.

## 2020-02-10 ENCOUNTER — TELEPHONE (OUTPATIENT)
Dept: ENDOCRINOLOGY CLINIC | Facility: CLINIC | Age: 55
End: 2020-02-10

## 2020-02-10 RX ORDER — EMPAGLIFLOZIN 25 MG/1
TABLET, FILM COATED ORAL
Qty: 90 TABLET | Refills: 0 | Status: SHIPPED | OUTPATIENT
Start: 2020-02-10 | End: 2020-05-13

## 2020-02-17 RX ORDER — LISINOPRIL 2.5 MG/1
TABLET ORAL
Qty: 90 TABLET | Refills: 1 | Status: SHIPPED | OUTPATIENT
Start: 2020-02-17 | End: 2020-07-28

## 2020-02-20 NOTE — TELEPHONE ENCOUNTER
•  METFORMIN HCL 1000 MG Oral Tab, TOME 1 TABLETA POR LA BOCA  DOS VECES AL RADHA CON LAS  COMIDAS, Disp: 180 tablet, Rfl: 0

## 2020-04-01 ENCOUNTER — PATIENT MESSAGE (OUTPATIENT)
Dept: ENDOCRINOLOGY CLINIC | Facility: CLINIC | Age: 55
End: 2020-04-01

## 2020-04-01 NOTE — TELEPHONE ENCOUNTER
Dr Raymond Vale, this patient is asking for lab orders. He has no faviola with you scheduled. I sent him my chart message letting him know he has to make fu faviola. And offered first available. Awaiting his response. Can you advise labs needed?

## 2020-04-01 NOTE — TELEPHONE ENCOUNTER
Fasting lipid panel is already in  He can do it now    a1c can be done at his apt, okay for first available  Thanks

## 2020-04-01 NOTE — TELEPHONE ENCOUNTER
From: Freddie Mcdonnell  To: Alexi Price MD  Sent: 4/1/2020 7:50 AM CDT  Subject: Visit Follow-up Question    [place] aaliyah jaimee para examen de mi alethea?y aaliyah prescription para analysis de [place]

## 2020-04-14 RX ORDER — DULAGLUTIDE 0.75 MG/.5ML
INJECTION, SOLUTION SUBCUTANEOUS
Qty: 6 ML | Refills: 0 | Status: SHIPPED | OUTPATIENT
Start: 2020-04-14 | End: 2020-04-23

## 2020-04-14 NOTE — TELEPHONE ENCOUNTER
Per Dr. Niyah Kilpatrick patient can be seen on first available appointment please see Mychart encounter dated 04/01/20. Spoke with Mary Hernández RN and asked if she could send a Selleration in Haitian as per chart review this patient is primarily [address] speaking.

## 2020-04-14 NOTE — TELEPHONE ENCOUNTER
Refill sent to pharmacy. Looks like patient will be due for follow up visit in may.      Please gently remind patient to schedule telephone visit (since patient is Azeri speaking and translating services not an option for video visit at this time)

## 2020-04-14 NOTE — TELEPHONE ENCOUNTER
My chart message sent in [address] detailing messages below to do labs and to make faviola. First available ok.

## 2020-04-21 ENCOUNTER — TELEPHONE (OUTPATIENT)
Dept: ENDOCRINOLOGY CLINIC | Facility: CLINIC | Age: 55
End: 2020-04-21

## 2020-04-21 NOTE — TELEPHONE ENCOUNTER
Current Outpatient Medications   Medication Sig Dispense Refill   • TRULICITY 2.80 DM/7.0RZ Subcutaneous Solution Pen-injector INJECT 0.5 ML UNDER THE SKIN ONCE A WEEK 6 mL 0     Per pharmacy a Prior Auth has been started.   To complete go to:    Go.covermy

## 2020-04-21 NOTE — TELEPHONE ENCOUNTER
Please send pa      Medication PA Requested:                                                          CoverMyMeds Used:  CBA:[id number]  Sig: trulicity 0.5 mg weekly   DX Code:                                     CPT code (if applicable):   Case Number/Pendi

## 2020-04-22 NOTE — TELEPHONE ENCOUNTER
Medication PA Requested: Trulicity 9.88JU SQ weekly. CoverMyMeds Used: Yes  Key: [id number]  Sig: Inject 0.75mg SQ weekly.   DX Code: E11.9               PA initiated via CoverMyMeds       Will Await bandar

## 2020-04-22 NOTE — TELEPHONE ENCOUNTER
Medication PA Requested: Trulicity 7.07TM SQ weekly. CoverMyMeds Used: Yes  Key: [id number]  Sig: Inject 0.75mg SQ weekly.   DX Code: E11.9                                    CPT code (if applicable):   Ca

## 2020-04-27 NOTE — TELEPHONE ENCOUNTER
PA Approved for Trulicity 9.46EF SQ weekly.   Pt authorization number: [id number]  Authorization approval dates: 4/22/20 until 4/22/21

## 2020-04-28 ENCOUNTER — TELEPHONE (OUTPATIENT)
Dept: ENDOCRINOLOGY CLINIC | Facility: CLINIC | Age: 55
End: 2020-04-28

## 2020-05-05 ENCOUNTER — TELEMEDICINE (OUTPATIENT)
Dept: ENDOCRINOLOGY CLINIC | Facility: CLINIC | Age: 55
End: 2020-05-05
Payer: COMMERCIAL

## 2020-05-05 DIAGNOSIS — E78.1 HYPERTRIGLYCERIDEMIA: ICD-10-CM

## 2020-05-05 DIAGNOSIS — E11.69 TYPE 2 DIABETES MELLITUS WITH OTHER SPECIFIED COMPLICATION, WITHOUT LONG-TERM CURRENT USE OF INSULIN (HCC): Primary | ICD-10-CM

## 2020-05-05 PROCEDURE — 99213 OFFICE O/P EST LOW 20 MIN: CPT | Performed by: INTERNAL MEDICINE

## 2020-05-05 NOTE — PROGRESS NOTES
Bjorn Frias verbally consents to a video visit on 5/5/2020     Patient understands and accepts financial responsibility for any deductible, co-insurance and/or co-pays associated with this service.     This visit is conducted using Tel Known Allergies    PAST MEDICAL, SOCIAL AND FAMILY HISTORY:  See past medical history marked as reviewed. See past surgical history marked as reviewed. See past family history marked as reviewed. See past social history marked as reviewed.     ASSESSMENT counselled regarding side effects including injection site reactions, nausea, vomiting, diarrhea, pancreatitis, gastroparesis and rare side effect abdulkadir Abdirizak syndrome.     -  Jardiance 25 mg daily  Discussed side effects including UTI and fungal infecti

## 2020-05-17 ENCOUNTER — PATIENT MESSAGE (OUTPATIENT)
Dept: ENDOCRINOLOGY CLINIC | Facility: CLINIC | Age: 55
End: 2020-05-17

## 2020-05-18 RX ORDER — EMPAGLIFLOZIN 25 MG/1
TABLET, FILM COATED ORAL
Qty: 90 TABLET | Refills: 1 | OUTPATIENT
Start: 2020-05-18

## 2020-05-18 NOTE — TELEPHONE ENCOUNTER
From: Anne Marie Babb  To: Emy De Leon MD  Sent: 5/17/2020 1:09 PM CDT  Subject: Non-Urgent Medical Question    Need refill jardiance for [place] here [place]

## 2020-05-28 ENCOUNTER — HOSPITAL ENCOUNTER (OUTPATIENT)
Dept: GENERAL RADIOLOGY | Age: 55
Discharge: HOME | End: 2020-05-28
Attending: INTERNAL MEDICINE
Payer: COMMERCIAL

## 2020-05-28 DIAGNOSIS — R52 PAIN: ICD-10-CM

## 2020-05-28 PROCEDURE — 73650 X-RAY EXAM OF HEEL: CPT | Performed by: INTERNAL MEDICINE

## 2020-05-28 PROCEDURE — 73630 X-RAY EXAM OF FOOT: CPT | Performed by: INTERNAL MEDICINE

## 2020-05-29 ENCOUNTER — TELEPHONE (OUTPATIENT)
Dept: ENDOCRINOLOGY CLINIC | Facility: CLINIC | Age: 55
End: 2020-05-29

## 2020-05-29 ENCOUNTER — LAB ENCOUNTER (OUTPATIENT)
Dept: LAB | Age: 55
End: 2020-05-29
Attending: INTERNAL MEDICINE
Payer: COMMERCIAL

## 2020-05-29 DIAGNOSIS — E78.5 DYSLIPIDEMIA: ICD-10-CM

## 2020-05-29 DIAGNOSIS — E78.1 HYPERTRIGLYCERIDEMIA: ICD-10-CM

## 2020-05-29 DIAGNOSIS — E11.69 TYPE 2 DIABETES MELLITUS WITH OTHER SPECIFIED COMPLICATION, WITHOUT LONG-TERM CURRENT USE OF INSULIN (HCC): ICD-10-CM

## 2020-05-29 LAB
ANION GAP SERPL CALC-SCNC: 8 MMOL/L (ref 0–18)
BUN BLD-MCNC: 15 MG/DL (ref 7–18)
BUN/CREAT SERPL: 17.2 (ref 10–20)
CALCIUM BLD-MCNC: 8.9 MG/DL (ref 8.5–10.1)
CHLORIDE SERPL-SCNC: 105 MMOL/L (ref 98–112)
CHOLEST SMN-MCNC: 186 MG/DL (ref ?–200)
CO2 SERPL-SCNC: 25 MMOL/L (ref 21–32)
CREAT BLD-MCNC: 0.87 MG/DL (ref 0.7–1.3)
CREAT UR-SCNC: 160 MG/DL
EST. AVERAGE GLUCOSE BLD GHB EST-MCNC: 146 MG/DL (ref 68–126)
GLUCOSE BLD-MCNC: 108 MG/DL (ref 70–99)
HBA1C MFR BLD HPLC: 6.7 % (ref ?–5.7)
HDLC SERPL-MCNC: 39 MG/DL (ref 40–59)
LDLC SERPL CALC-MCNC: 71 MG/DL (ref ?–100)
MICROALBUMIN UR-MCNC: 1.15 MG/DL
MICROALBUMIN/CREAT 24H UR-RTO: 7.2 UG/MG (ref ?–30)
NONHDLC SERPL-MCNC: 147 MG/DL (ref ?–130)
OSMOLALITY SERPL CALC.SUM OF ELEC: 287 MOSM/KG (ref 275–295)
PATIENT FASTING Y/N/NP: YES
PATIENT FASTING Y/N/NP: YES
POTASSIUM SERPL-SCNC: 4.5 MMOL/L (ref 3.5–5.1)
SODIUM SERPL-SCNC: 138 MMOL/L (ref 136–145)
TRIGL SERPL-MCNC: 379 MG/DL (ref 30–149)
VLDLC SERPL CALC-MCNC: 76 MG/DL (ref 0–30)

## 2020-05-29 PROCEDURE — 82570 ASSAY OF URINE CREATININE: CPT

## 2020-05-29 PROCEDURE — 36415 COLL VENOUS BLD VENIPUNCTURE: CPT

## 2020-05-29 PROCEDURE — 80048 BASIC METABOLIC PNL TOTAL CA: CPT

## 2020-05-29 PROCEDURE — 80061 LIPID PANEL: CPT

## 2020-05-29 PROCEDURE — 82043 UR ALBUMIN QUANTITATIVE: CPT

## 2020-05-29 PROCEDURE — 83036 HEMOGLOBIN GLYCOSYLATED A1C: CPT

## 2020-05-29 NOTE — TELEPHONE ENCOUNTER
a1c is good at 6.7 %  Ur ma and gfr normal  TG cholesterol is high and higher than before please review and send reading material for a low fat diet  Thanks

## 2020-06-01 ENCOUNTER — PATIENT MESSAGE (OUTPATIENT)
Dept: ENDOCRINOLOGY CLINIC | Facility: CLINIC | Age: 55
End: 2020-06-01

## 2020-06-01 NOTE — TELEPHONE ENCOUNTER
Spoke with pt on the phone, adivsed MD recommendations and information regarding lab work. Pt states lab work is not showing on mychart, RN advised to wait a few more days, could be delay. Offered to send results along with low fat diet pamphlet.      A

## 2020-06-02 NOTE — TELEPHONE ENCOUNTER
See TE dated 5/29/20. RN has already spoken with the patient regarding the lab results from 5/29/20.  All results released to [employer] per the patient's request.

## 2020-06-02 NOTE — TELEPHONE ENCOUNTER
From: Bessy Godinez  To: Macy Dumont MD  Sent: 6/1/2020 3:20 PM CDT  Subject: Non-Urgent Medical Question    I have a question about MICROALB/CREAT RATIO, RANDOM URINE resulted on 5/29/20 at 3:38 PM.   I need to see my glucose test p

## 2020-07-07 NOTE — TELEPHONE ENCOUNTER
is double booked for Monday at 4:30pm ==pt asked if he could come at earlier time but unable.
No change

## 2020-07-20 ENCOUNTER — TELEPHONE (OUTPATIENT)
Dept: ENDOCRINOLOGY CLINIC | Facility: CLINIC | Age: 55
End: 2020-07-20

## 2020-07-22 ENCOUNTER — TELEPHONE (OUTPATIENT)
Dept: NEUROLOGY | Facility: CLINIC | Age: 55
End: 2020-07-22

## 2020-07-22 ENCOUNTER — OFFICE VISIT (OUTPATIENT)
Dept: NEUROLOGY | Facility: CLINIC | Age: 55
End: 2020-07-22
Payer: COMMERCIAL

## 2020-07-22 ENCOUNTER — HOSPITAL ENCOUNTER (OUTPATIENT)
Dept: GENERAL RADIOLOGY | Age: 55
Discharge: HOME | End: 2020-07-22
Attending: NURSE PRACTITIONER
Payer: COMMERCIAL

## 2020-07-22 ENCOUNTER — OFFICE VISIT (OUTPATIENT)
Dept: FAMILY MEDICINE CLINIC | Facility: CLINIC | Age: 55
End: 2020-07-22
Payer: COMMERCIAL

## 2020-07-22 VITALS — BODY MASS INDEX: 26.6 KG/M2 | HEIGHT: 71 IN | WEIGHT: 190 LBS

## 2020-07-22 VITALS
HEART RATE: 64 BPM | TEMPERATURE: 97 F | HEIGHT: 71 IN | SYSTOLIC BLOOD PRESSURE: 126 MMHG | DIASTOLIC BLOOD PRESSURE: 86 MMHG | WEIGHT: 196 LBS | BODY MASS INDEX: 27.44 KG/M2

## 2020-07-22 DIAGNOSIS — M47.816 LUMBAR FACET JOINT SYNDROME: ICD-10-CM

## 2020-07-22 DIAGNOSIS — M54.16 LUMBAR RADICULOPATHY, ACUTE: ICD-10-CM

## 2020-07-22 DIAGNOSIS — E11.9 DIABETES MELLITUS TYPE 2 IN NONOBESE (HCC): ICD-10-CM

## 2020-07-22 DIAGNOSIS — M51.26 LUMBAR DISC HERNIATION: ICD-10-CM

## 2020-07-22 DIAGNOSIS — M54.42 CHRONIC LEFT-SIDED LOW BACK PAIN WITH LEFT-SIDED SCIATICA: ICD-10-CM

## 2020-07-22 DIAGNOSIS — M51.26 BULGE OF LUMBAR DISC WITHOUT MYELOPATHY: ICD-10-CM

## 2020-07-22 DIAGNOSIS — M48.062 SPINAL STENOSIS OF LUMBAR REGION WITH NEUROGENIC CLAUDICATION: ICD-10-CM

## 2020-07-22 DIAGNOSIS — M51.37 DDD (DEGENERATIVE DISC DISEASE), LUMBOSACRAL: ICD-10-CM

## 2020-07-22 DIAGNOSIS — M54.59 LUMBAR TRIGGER POINT SYNDROME: ICD-10-CM

## 2020-07-22 DIAGNOSIS — M47.816 FACET SYNDROME, LUMBAR: ICD-10-CM

## 2020-07-22 DIAGNOSIS — M54.59 MECHANICAL LOW BACK PAIN: Primary | ICD-10-CM

## 2020-07-22 DIAGNOSIS — M79.18 LEFT BUTTOCK PAIN: ICD-10-CM

## 2020-07-22 DIAGNOSIS — S60.551A FOREIGN BODY OF RIGHT HAND, INITIAL ENCOUNTER: ICD-10-CM

## 2020-07-22 DIAGNOSIS — M79.10 MYALGIA: ICD-10-CM

## 2020-07-22 DIAGNOSIS — G89.29 CHRONIC LEFT-SIDED LOW BACK PAIN WITH LEFT-SIDED SCIATICA: ICD-10-CM

## 2020-07-22 DIAGNOSIS — M48.061 LUMBAR FORAMINAL STENOSIS: ICD-10-CM

## 2020-07-22 DIAGNOSIS — K21.9 GASTROESOPHAGEAL REFLUX DISEASE WITHOUT ESOPHAGITIS: ICD-10-CM

## 2020-07-22 DIAGNOSIS — M47.816 LUMBAR SPONDYLOSIS: ICD-10-CM

## 2020-07-22 DIAGNOSIS — S60.551A FOREIGN BODY OF RIGHT HAND, INITIAL ENCOUNTER: Primary | ICD-10-CM

## 2020-07-22 PROBLEM — M51.369 BULGE OF LUMBAR DISC WITHOUT MYELOPATHY: Status: ACTIVE | Noted: 2019-09-09

## 2020-07-22 PROBLEM — M51.36 BULGE OF LUMBAR DISC WITHOUT MYELOPATHY: Status: ACTIVE | Noted: 2019-09-09

## 2020-07-22 PROCEDURE — 3008F BODY MASS INDEX DOCD: CPT | Performed by: NURSE PRACTITIONER

## 2020-07-22 PROCEDURE — 3074F SYST BP LT 130 MM HG: CPT | Performed by: NURSE PRACTITIONER

## 2020-07-22 PROCEDURE — 99213 OFFICE O/P EST LOW 20 MIN: CPT | Performed by: NURSE PRACTITIONER

## 2020-07-22 PROCEDURE — 3079F DIAST BP 80-89 MM HG: CPT | Performed by: NURSE PRACTITIONER

## 2020-07-22 PROCEDURE — 3008F BODY MASS INDEX DOCD: CPT | Performed by: PHYSICAL MEDICINE & REHABILITATION

## 2020-07-22 PROCEDURE — 99214 OFFICE O/P EST MOD 30 MIN: CPT | Performed by: PHYSICAL MEDICINE & REHABILITATION

## 2020-07-22 PROCEDURE — 73130 X-RAY EXAM OF HAND: CPT | Performed by: NURSE PRACTITIONER

## 2020-07-22 RX ORDER — OMEPRAZOLE 40 MG/1
40 CAPSULE, DELAYED RELEASE ORAL DAILY
Qty: 90 CAPSULE | Refills: 3 | Status: SHIPPED | OUTPATIENT
Start: 2020-07-22 | End: 2020-10-20

## 2020-07-22 NOTE — TELEPHONE ENCOUNTER
Caudal TIKI under local anesthesia CPT Code: 30923- pending approval    Alma Love for authorization of approval for above. Spoke to Joseph Chand who states authorization is required from [facility].   Reference # [id number]    Called [facility] for Anny Azul

## 2020-07-22 NOTE — PROGRESS NOTES
HPI     Patient presents for foreign body to right hand. States that he was working and that something entered into his right palm about a week ago. Tried to remove and thinks that there is still fragments left inside of his hand.   Was releasing puss the resource strain: Not on file      Food insecurity:        Worry: Not on file        Inability: Not on file      Transportation needs:        Medical: Not on file        Non-medical: Not on file    Tobacco Use      Smoking status: Former Smoker        Quit capsule (40 mg total) by mouth daily. 90 capsule 3   • Empagliflozin (JARDIANCE) 25 MG Oral Tab Take 1 tablet by mouth daily. 90 tablet 1   • Dulaglutide (TRULICITY) 2.67 EM/1.5GS Subcutaneous Solution Pen-injector Inject 0.75 mg into the skin once a week. already registered.

## 2020-07-22 NOTE — PATIENT INSTRUCTIONS
Consejos para controlar el reflujo de ácido (agruras)    Para controlar el reflujo de ácido es necesario hacer algunos cambios básicos en la alimentación y el estilo de meena. Los siguientes consejos pueden ser suficientes para aliviar el malestar.   [place] Es posible que high proveedor de atención médica le recete medicamentos para tratar el reflujo de ácido. Los medicamentos se basarán en los síntomas que tenga y en los resultados de [place].  High proveedor de atención médica le explicará cómo [employer] med

## 2020-07-22 NOTE — PROGRESS NOTES
[address]  Progress Note    CHIEF COMPLAINT:  Patient presents with:  Low Back Pain: Patient presents to follow up on low back pain.  LOV 09/12/2020 He states that last week he had a bad week due to low back tablet 1   • Dulaglutide (TRULICITY) 7.82 IS/1.2OE Subcutaneous Solution Pen-injector Inject 0.75 mg into the skin once a week. 6 mL 0   • metFORMIN HCl 1000 MG Oral Tab Take 1 tablet (1,000 mg total) by mouth 2 (two) times daily with meals.  [place] 1 tableta reveals no scoliosis or kyphosis.   Palpation: no ttp over spinous process, paraspinal muscles, SI joints, prirformis muscle, glut's, greater trochanter bursa, or PSIS bilaterally  ROM: FAROM with pain during flexion  Motor: 5/5 in all myotomes of the BILAT 05/29/2020 160.00  mg/dL Final   • Malb/Cre Calc 05/29/2020 7.2  <=30.0 ug/mg Final   Patient Message on 05/23/2020   Component Date Value Ref Range Status   • SARS CoV 2 AB IGG 05/27/2020 NEGATIVE   Final   ]      Radiology Imaging:  I reviewed with the mariela moderate multifactorial spinal stenosis. There is also mild left greater than right neural   foraminal stenosis.   L5-S1:   Disc and facet related degeneration, which results in mild bilateral lateral recess stenosis but no significant spinal canal stenosi in the right hand. I will follow-up with the patient 2 weeks after the procedure       RTC in 2 weeks after his injection  Discharge Instructions were provided as documented in AVS summary. The patient was in agreement with the assessment and plan.   All

## 2020-07-22 NOTE — PATIENT INSTRUCTIONS
1) My office will call you to schedule the Caudal TIKI under local anesthesia once the procedure is approved by your insurance carrier. 2) Follow up with me 2 weeks after the procedure.  If no resolution of the symptoms, would recommend BILATERAL L3-S1

## 2020-07-28 RX ORDER — LISINOPRIL 2.5 MG/1
TABLET ORAL
Qty: 90 TABLET | Refills: 0 | Status: SHIPPED | OUTPATIENT
Start: 2020-07-28 | End: 2020-09-22

## 2020-07-28 NOTE — TELEPHONE ENCOUNTER
LOV 5/05/20. RTC 6 months. Pended 3 month supply.
Detail Level: Zone
Detail Level: Generalized
Patient Specific Counseling (Will Not Stick From Patient To Patient): Discussed IPL laser treatment.
Detail Level: Simple

## 2020-07-30 NOTE — TELEPHONE ENCOUNTER
Caudal TIKI under local anesthesia CPT Code: 69191- pending approval  Called [facility] for status of authorization for approval for above. Spoke to [facility]  who states request remains pending till 7/31/2020. A determination letter will be fax to office.   Flo

## 2020-07-31 ENCOUNTER — MED REC SCAN ONLY (OUTPATIENT)
Dept: NEUROLOGY | Facility: CLINIC | Age: 55
End: 2020-07-31

## 2020-07-31 NOTE — TELEPHONE ENCOUNTER
Patient has been scheduled for a Caudal TIKI under local anesthesia on 8/10/20 at the [facility]. Medications and allergies reviewed. Patient instructed to hold aspirin 325mg 5 days prior to procedure, nsaids 3 days prior to procedure.  Patient Denies being on blo

## 2020-07-31 NOTE — TELEPHONE ENCOUNTER
Caudal TIKI under local anesthesia CPT GINZ:76504- APPROVED  Received fax from [facility] advising approval for procedure above. Authorization # [id number] effective 7/23/2020 to 1/23/2021. Will inform ABDIFATAH approved referrals.     Sending authorization letter fo

## 2020-08-10 ENCOUNTER — OFFICE VISIT (OUTPATIENT)
Dept: SURGERY | Facility: CLINIC | Age: 55
End: 2020-08-10

## 2020-08-10 DIAGNOSIS — M48.061 LUMBAR FORAMINAL STENOSIS: ICD-10-CM

## 2020-08-10 DIAGNOSIS — M51.26 LUMBAR DISC HERNIATION: ICD-10-CM

## 2020-08-10 DIAGNOSIS — M54.42 CHRONIC LEFT-SIDED LOW BACK PAIN WITH LEFT-SIDED SCIATICA: ICD-10-CM

## 2020-08-10 DIAGNOSIS — G89.29 CHRONIC LEFT-SIDED LOW BACK PAIN WITH LEFT-SIDED SCIATICA: ICD-10-CM

## 2020-08-10 DIAGNOSIS — M51.26 BULGE OF LUMBAR DISC WITHOUT MYELOPATHY: ICD-10-CM

## 2020-08-10 DIAGNOSIS — M54.16 LUMBAR RADICULOPATHY, ACUTE: Primary | ICD-10-CM

## 2020-08-10 PROCEDURE — 62323 NJX INTERLAMINAR LMBR/SAC: CPT | Performed by: PHYSICAL MEDICINE & REHABILITATION

## 2020-08-10 NOTE — PROCEDURES
Vasu Hinojosa.    Caudal Epidural Steroid Injection  NAME:  Arcadio Duane    MR #:    [id number] :  1965     PHYSICIAN:  Suma Lind        Operative Report    DATE OF PROCEDURE: 8/10/2020   PREOPERATIVE DIAGNOS

## 2020-08-24 ENCOUNTER — TELEPHONE (OUTPATIENT)
Dept: ENDOCRINOLOGY CLINIC | Facility: CLINIC | Age: 55
End: 2020-08-24

## 2020-08-25 ENCOUNTER — PATIENT MESSAGE (OUTPATIENT)
Dept: ENDOCRINOLOGY CLINIC | Facility: CLINIC | Age: 55
End: 2020-08-25

## 2020-08-25 NOTE — TELEPHONE ENCOUNTER
Televisit: 4/21/20, RTC 6 months  Dr. Shira Limon, would you like to increase dose or keep at . 0.75mg dose?

## 2020-08-25 NOTE — TELEPHONE ENCOUNTER
From: To Savage  To: Tita Crews MD  Sent: 8/25/2020 8:29 AM CDT  Subject: Prescription Question    Can I have refill for trulicity at my farmacy ? ?  Tks

## 2020-09-22 RX ORDER — LISINOPRIL 2.5 MG/1
TABLET ORAL
Qty: 90 TABLET | Refills: 0 | Status: SHIPPED | OUTPATIENT
Start: 2020-09-22 | End: 2020-12-12

## 2020-10-16 DIAGNOSIS — K21.9 GASTROESOPHAGEAL REFLUX DISEASE WITHOUT ESOPHAGITIS: ICD-10-CM

## 2020-10-16 NOTE — TELEPHONE ENCOUNTER
Per pharmacy pt is requesting refill for the following medication. •  Omeprazole 40 MG Oral Capsule Delayed Release, Take 1 capsule (40 mg total) by mouth daily. , Disp: 90 capsule, Rfl: 3

## 2020-10-20 ENCOUNTER — OFFICE VISIT (OUTPATIENT)
Dept: FAMILY MEDICINE CLINIC | Facility: CLINIC | Age: 55
End: 2020-10-20
Payer: COMMERCIAL

## 2020-10-20 VITALS
HEART RATE: 67 BPM | WEIGHT: 201 LBS | HEIGHT: 71 IN | SYSTOLIC BLOOD PRESSURE: 143 MMHG | BODY MASS INDEX: 28.14 KG/M2 | TEMPERATURE: 98 F | DIASTOLIC BLOOD PRESSURE: 88 MMHG

## 2020-10-20 DIAGNOSIS — N52.8 OTHER MALE ERECTILE DYSFUNCTION: ICD-10-CM

## 2020-10-20 DIAGNOSIS — J02.9 PHARYNGITIS, UNSPECIFIED ETIOLOGY: ICD-10-CM

## 2020-10-20 DIAGNOSIS — H92.02 LEFT EAR PAIN: Primary | ICD-10-CM

## 2020-10-20 PROCEDURE — 3077F SYST BP >= 140 MM HG: CPT | Performed by: FAMILY MEDICINE

## 2020-10-20 PROCEDURE — 99214 OFFICE O/P EST MOD 30 MIN: CPT | Performed by: FAMILY MEDICINE

## 2020-10-20 PROCEDURE — 3079F DIAST BP 80-89 MM HG: CPT | Performed by: FAMILY MEDICINE

## 2020-10-20 PROCEDURE — 3008F BODY MASS INDEX DOCD: CPT | Performed by: FAMILY MEDICINE

## 2020-10-20 RX ORDER — AMOXICILLIN AND CLAVULANATE POTASSIUM 875; 125 MG/1; MG/1
1 TABLET, FILM COATED ORAL 2 TIMES DAILY
Qty: 20 TABLET | Refills: 0 | Status: SHIPPED | OUTPATIENT
Start: 2020-10-20 | End: 2020-10-30

## 2020-10-20 RX ORDER — OMEPRAZOLE 40 MG/1
40 CAPSULE, DELAYED RELEASE ORAL DAILY
Qty: 90 CAPSULE | Refills: 3 | Status: SHIPPED | OUTPATIENT
Start: 2020-10-20 | End: 2021-10-15

## 2020-10-20 RX ORDER — TADALAFIL 10 MG/1
TABLET ORAL
Qty: 15 TABLET | Refills: 3 | Status: SHIPPED | OUTPATIENT
Start: 2020-10-20

## 2020-10-20 NOTE — PROGRESS NOTES
Patient presents with:  Ear Problem: left ear problem x 6 days   Sore Throat: left side sore throat x 6days, becomes painful when swallowing      HPI:   Kasey Holly is a 48year old male who presents to clinic with complaints of L side gargles for throat pain. Tylenol PRN for pain and fever.   - SARS-COV-2 BY PCR (); Future  - Amoxicillin-Pot Clavulanate 875-125 MG Oral Tab; Take 1 tablet by mouth 2 (two) times daily for 10 days. Dispense: 20 tablet; Refill: 0    3.  Other male erec

## 2020-10-21 ENCOUNTER — APPOINTMENT (OUTPATIENT)
Dept: LAB | Age: 55
End: 2020-10-21
Attending: FAMILY MEDICINE
Payer: COMMERCIAL

## 2020-10-21 DIAGNOSIS — H92.02 LEFT EAR PAIN: ICD-10-CM

## 2020-10-21 DIAGNOSIS — J02.9 PHARYNGITIS, UNSPECIFIED ETIOLOGY: ICD-10-CM

## 2020-10-29 ENCOUNTER — OFFICE VISIT (OUTPATIENT)
Dept: FAMILY MEDICINE CLINIC | Facility: CLINIC | Age: 55
End: 2020-10-29
Payer: COMMERCIAL

## 2020-10-29 ENCOUNTER — LAB ENCOUNTER (OUTPATIENT)
Dept: LAB | Age: 55
End: 2020-10-29
Attending: FAMILY MEDICINE
Payer: COMMERCIAL

## 2020-10-29 VITALS
WEIGHT: 204.31 LBS | SYSTOLIC BLOOD PRESSURE: 130 MMHG | HEART RATE: 67 BPM | HEIGHT: 71 IN | DIASTOLIC BLOOD PRESSURE: 86 MMHG | BODY MASS INDEX: 28.6 KG/M2

## 2020-10-29 DIAGNOSIS — K21.9 GASTROESOPHAGEAL REFLUX DISEASE WITHOUT ESOPHAGITIS: Primary | ICD-10-CM

## 2020-10-29 DIAGNOSIS — K21.9 GASTROESOPHAGEAL REFLUX DISEASE WITHOUT ESOPHAGITIS: ICD-10-CM

## 2020-10-29 DIAGNOSIS — R59.0 ENLARGED LYMPH NODE IN NECK: ICD-10-CM

## 2020-10-29 PROCEDURE — 3008F BODY MASS INDEX DOCD: CPT | Performed by: FAMILY MEDICINE

## 2020-10-29 PROCEDURE — 3075F SYST BP GE 130 - 139MM HG: CPT | Performed by: FAMILY MEDICINE

## 2020-10-29 PROCEDURE — 85025 COMPLETE CBC W/AUTO DIFF WBC: CPT

## 2020-10-29 PROCEDURE — 3079F DIAST BP 80-89 MM HG: CPT | Performed by: FAMILY MEDICINE

## 2020-10-29 PROCEDURE — 36415 COLL VENOUS BLD VENIPUNCTURE: CPT

## 2020-10-29 PROCEDURE — 99213 OFFICE O/P EST LOW 20 MIN: CPT | Performed by: FAMILY MEDICINE

## 2020-10-29 NOTE — PROGRESS NOTES
Blood pressure 130/86, pulse 67, height 5' 11\" (1.803 m), weight 204 lb 5 oz (92.7 kg). Patient presents today reporting that he has a painful lymph node in his neck. He has had it for 15 days.   Saw the dentist dentist does not believe it is a dental

## 2020-10-29 NOTE — PATIENT INSTRUCTIONS
Linfadenopatía  Es la inflamación (hinchazón) de los ganglios linfáticos. Estos son glándulas pequeñas en forma de frijoles repartidas por todo el cuerpo. ¿Qué son los ganglios linfáticos? Treasure parte del sistema inmunitario.  Estas glándulas están en · Reacciones a medicamentos ariane antibióticos y algunos para la presión arterial, la gota y anticonvulsivos  · Otras afecciones, ariane lupus o sarcoidosis  Síntomas de la linfadenopatía  Esta enfermedad puede causar síntomas ariane los siguientes:  · Bultos d El tratamiento del agrandamiento de los ganglios linfáticos depende de la causa. Generalmente el agrandamiento de los ganglios linfáticos no causa daño y se va sin tratamiento.  En la [employer], se trata la causa del agrandamiento de los ganglios

## 2020-10-30 ENCOUNTER — OFFICE VISIT (OUTPATIENT)
Dept: OTOLARYNGOLOGY | Facility: CLINIC | Age: 55
End: 2020-10-30
Payer: COMMERCIAL

## 2020-10-30 VITALS
BODY MASS INDEX: 28.61 KG/M2 | HEIGHT: 71 IN | WEIGHT: 204.38 LBS | SYSTOLIC BLOOD PRESSURE: 124 MMHG | TEMPERATURE: 98 F | DIASTOLIC BLOOD PRESSURE: 82 MMHG

## 2020-10-30 DIAGNOSIS — M54.2 NECK PAIN: Primary | ICD-10-CM

## 2020-10-30 DIAGNOSIS — R07.0 THROAT PAIN: ICD-10-CM

## 2020-10-30 PROCEDURE — 3008F BODY MASS INDEX DOCD: CPT | Performed by: OTOLARYNGOLOGY

## 2020-10-30 PROCEDURE — 3074F SYST BP LT 130 MM HG: CPT | Performed by: OTOLARYNGOLOGY

## 2020-10-30 PROCEDURE — 3079F DIAST BP 80-89 MM HG: CPT | Performed by: OTOLARYNGOLOGY

## 2020-10-30 PROCEDURE — 99243 OFF/OP CNSLTJ NEW/EST LOW 30: CPT | Performed by: OTOLARYNGOLOGY

## 2020-10-30 PROCEDURE — 31575 DIAGNOSTIC LARYNGOSCOPY: CPT | Performed by: OTOLARYNGOLOGY

## 2020-10-30 RX ORDER — CLINDAMYCIN HYDROCHLORIDE 300 MG/1
300 CAPSULE ORAL EVERY 8 HOURS
Qty: 21 CAPSULE | Refills: 0 | Status: SHIPPED | OUTPATIENT
Start: 2020-10-30 | End: 2020-11-06

## 2020-10-30 RX ORDER — METHYLPREDNISOLONE 4 MG/1
TABLET ORAL
Qty: 1 PACKAGE | Refills: 0 | Status: SHIPPED | OUTPATIENT
Start: 2020-10-30 | End: 2020-12-04

## 2020-10-30 NOTE — PROGRESS NOTES
oRsa M Velasquez is a 48year old male.   Patient presents with:  Lymph Node: swollen lymph node per Dr. Donel Sacks    He presents with 2-week history of discomfort in the posterior aspect of the submandibular tr SYSTEMS    System Neg/Pos Details   Constitutional Negative Fatigue, fever and weight loss. ENMT Negative Drooling. Eyes Negative Blurred vision and vision changes. Respiratory Negative Dyspnea and wheezing.    Cardio Negative Chest pain, irregular he External nose - Normal. Lips/teeth/gums - Normal. Tonsils - Normal. Oropharynx - Normal.   Nose/Mouth/Throat Normal Nares - Right: Normal Left: Normal. Septum -Normal  Turbinates - Right: Normal, Left: Normal.   Procedures:  Endoscopy/Laryngoscopy  Pre-Pro 1  •  metFORMIN HCl 1000 MG Oral Tab, Take 1 tablet (1,000 mg total) by mouth 2 (two) times daily with meals.  [place] 1 tableta por margoth oropeza al conrad con las comidas., Disp: 180 tablet, Rfl: 1  •  BD PEN NEEDLE MINI U/F 31G X 5 MM Does not apply Misc,

## 2020-11-12 ENCOUNTER — TELEPHONE (OUTPATIENT)
Dept: ENDOCRINOLOGY CLINIC | Facility: CLINIC | Age: 55
End: 2020-11-12

## 2020-11-12 ENCOUNTER — TELEPHONE (OUTPATIENT)
Dept: FAMILY MEDICINE CLINIC | Facility: CLINIC | Age: 55
End: 2020-11-12

## 2020-11-12 DIAGNOSIS — U07.1 COVID-19: Primary | ICD-10-CM

## 2020-11-12 DIAGNOSIS — E11.69 TYPE 2 DIABETES MELLITUS WITH OTHER SPECIFIED COMPLICATION, WITHOUT LONG-TERM CURRENT USE OF INSULIN (HCC): Primary | ICD-10-CM

## 2020-11-12 RX ORDER — EMPAGLIFLOZIN 25 MG/1
1 TABLET, FILM COATED ORAL DAILY
Qty: 90 TABLET | Refills: 0 | Status: SHIPPED | OUTPATIENT
Start: 2020-11-12 | End: 2021-01-17

## 2020-11-12 RX ORDER — AZITHROMYCIN 250 MG/1
TABLET, FILM COATED ORAL
Qty: 6 TABLET | Refills: 0 | Status: SHIPPED | OUTPATIENT
Start: 2020-11-12 | End: 2020-11-17

## 2020-11-12 NOTE — TELEPHONE ENCOUNTER
Current Outpatient Medications:   •  Empagliflozin (JARDIANCE) 25 MG Oral Tab, Take 1 tablet by mouth daily. , Disp: 90 tablet, Rfl: 1

## 2020-11-12 NOTE — TELEPHONE ENCOUNTER
Patient states that he tested positive for Covid. Assuming he was retested after 10/21 Covid test that is in the system. Z-Rell sent to the pharmacy. He has diabetes and is symptomatic with fevers and cough.   Please enroll him in the home monitoring prog

## 2020-11-13 NOTE — TELEPHONE ENCOUNTER
Refilled but needs FU  Can offer VV on 11/25, okay to overbook between 12-1 pm  Please do a fasting lipid panel and Ur MA 2-3 days prior to visit  Thanks

## 2020-11-16 NOTE — TELEPHONE ENCOUNTER
Triage team will monitor patient .   Please DO NOT close this encounter    With [employer]  Sherie Levy ID [id number]    What was your temp today? - 97.2    How did you take your temp?     with an oral thermometer    Are you feeling short of

## 2020-11-17 ENCOUNTER — TELEPHONE (OUTPATIENT)
Dept: ENDOCRINOLOGY CLINIC | Facility: CLINIC | Age: 55
End: 2020-11-17

## 2020-11-17 NOTE — TELEPHONE ENCOUNTER
•  Dulaglutide (TRULICITY) 5.64 KG/2.3LI Subcutaneous Solution Pen-injector, Inject 0.75 mg into the skin once a week., Disp: 6 mL, Rfl: 1      Pharmacy comments: Plan does not cover this medication.  Please call plan at [phone] to initiate prior aut

## 2020-11-18 NOTE — TELEPHONE ENCOUNTER
Medication PA Requested: Trulicity 1.25 mg                                                      CoverMyMeds Used: No  Key:  Sig: Inject 0.75 mg once a week  DX Code: E11.69                                     CPT code (if applicable):   Case Number/Pending

## 2020-11-19 NOTE — TELEPHONE ENCOUNTER
Patient is Covid positive since 11/9/20. Patient will be unable to go get labs done. Do you still want patient to f/u virtually? Please advise.

## 2020-11-19 NOTE — TELEPHONE ENCOUNTER
I will like to still keep the virtual appointment to go over Bg etc since LOv was May 2020  However, if he prefers to get labs and then do VV  He can get labs once he is better and can move apt as VV to end of dec   Okay to overbook if needed  [facility] he gets

## 2020-11-20 NOTE — TELEPHONE ENCOUNTER
Pt states he prefers to complete labs and book apt for late December. Advised that labs are in the system. Pt understood.

## 2020-11-23 NOTE — TELEPHONE ENCOUNTER
Spoke with Christian:    Patient states [facility], and mail-order is $200.  He would like to continue getting his medication at [facility], although states he will get his medication through mail-order if there is no other optio

## 2020-11-23 NOTE — TELEPHONE ENCOUNTER
Contacted [facility] to notify them that PA is already approved for prescription. Per local pharmacy, they are receiving response that plan is requesting patient receive medication through mail order pharmacy.  The patient may contact insurance to request

## 2020-12-04 ENCOUNTER — OFFICE VISIT (OUTPATIENT)
Dept: FAMILY MEDICINE CLINIC | Facility: CLINIC | Age: 55
End: 2020-12-04
Payer: COMMERCIAL

## 2020-12-04 ENCOUNTER — HOSPITAL ENCOUNTER (OUTPATIENT)
Dept: GENERAL RADIOLOGY | Age: 55
Discharge: HOME | End: 2020-12-04
Attending: FAMILY MEDICINE
Payer: COMMERCIAL

## 2020-12-04 VITALS
SYSTOLIC BLOOD PRESSURE: 129 MMHG | HEART RATE: 78 BPM | DIASTOLIC BLOOD PRESSURE: 84 MMHG | HEIGHT: 71 IN | RESPIRATION RATE: 18 BRPM | WEIGHT: 194.69 LBS | BODY MASS INDEX: 27.26 KG/M2

## 2020-12-04 DIAGNOSIS — M54.2 NECK PAIN: ICD-10-CM

## 2020-12-04 DIAGNOSIS — M54.2 NECK PAIN: Primary | ICD-10-CM

## 2020-12-04 PROCEDURE — 3008F BODY MASS INDEX DOCD: CPT | Performed by: FAMILY MEDICINE

## 2020-12-04 PROCEDURE — 72050 X-RAY EXAM NECK SPINE 4/5VWS: CPT | Performed by: FAMILY MEDICINE

## 2020-12-04 PROCEDURE — 99213 OFFICE O/P EST LOW 20 MIN: CPT | Performed by: FAMILY MEDICINE

## 2020-12-04 PROCEDURE — 3074F SYST BP LT 130 MM HG: CPT | Performed by: FAMILY MEDICINE

## 2020-12-04 PROCEDURE — 3079F DIAST BP 80-89 MM HG: CPT | Performed by: FAMILY MEDICINE

## 2020-12-04 RX ORDER — HYDROCODONE BITARTRATE AND ACETAMINOPHEN 10; 325 MG/1; MG/1
1 TABLET ORAL EVERY 6 HOURS PRN
Qty: 45 TABLET | Refills: 0 | Status: SHIPPED | OUTPATIENT
Start: 2020-12-04 | End: 2021-02-17

## 2020-12-04 NOTE — PROGRESS NOTES
Blood pressure 129/84, pulse 78, resp. rate 18, height 5' 11\" (1.803 m), weight 194 lb 11.2 oz (88.3 kg). Neck pain and stiffness for the past 6 days. Denies any injury. Similar occurrence 6 weeks ago. Patient has taken naproxen without relief.   He

## 2020-12-05 ENCOUNTER — LAB ENCOUNTER (OUTPATIENT)
Dept: LAB | Age: 55
End: 2020-12-05
Attending: INTERNAL MEDICINE
Payer: COMMERCIAL

## 2020-12-05 ENCOUNTER — HOSPITAL ENCOUNTER (OUTPATIENT)
Age: 55
Discharge: HOME | End: 2020-12-05
Attending: EMERGENCY MEDICINE
Payer: COMMERCIAL

## 2020-12-05 VITALS
DIASTOLIC BLOOD PRESSURE: 85 MMHG | WEIGHT: 195 LBS | RESPIRATION RATE: 18 BRPM | TEMPERATURE: 99 F | BODY MASS INDEX: 27.3 KG/M2 | OXYGEN SATURATION: 98 % | HEART RATE: 78 BPM | HEIGHT: 71 IN | SYSTOLIC BLOOD PRESSURE: 122 MMHG

## 2020-12-05 DIAGNOSIS — E11.69 TYPE 2 DIABETES MELLITUS WITH OTHER SPECIFIED COMPLICATION, WITHOUT LONG-TERM CURRENT USE OF INSULIN (HCC): ICD-10-CM

## 2020-12-05 DIAGNOSIS — Z20.822 ENCOUNTER FOR LABORATORY TESTING FOR COVID-19 VIRUS: Primary | ICD-10-CM

## 2020-12-05 PROCEDURE — 80061 LIPID PANEL: CPT

## 2020-12-05 PROCEDURE — 83036 HEMOGLOBIN GLYCOSYLATED A1C: CPT

## 2020-12-05 PROCEDURE — 99202 OFFICE O/P NEW SF 15 MIN: CPT | Performed by: EMERGENCY MEDICINE

## 2020-12-05 PROCEDURE — 80053 COMPREHEN METABOLIC PANEL: CPT

## 2020-12-05 PROCEDURE — 82043 UR ALBUMIN QUANTITATIVE: CPT

## 2020-12-05 PROCEDURE — 36415 COLL VENOUS BLD VENIPUNCTURE: CPT

## 2020-12-05 PROCEDURE — 82570 ASSAY OF URINE CREATININE: CPT

## 2020-12-05 NOTE — ED PROVIDER NOTES
Patient Seen in: [facility]      History   Patient presents with:  Covid-19 Test    Stated Complaint: Test; no symptoms    HPI    Patient is a 52-year-old diabetic male who presents to immediate care for Covid testing.   He states about 5 week negative except as noted above.     Physical Exam     ED Triage Vitals [12/05/20 0844]   /85   Pulse 78   Resp 18   Temp 98.5 °F (36.9 °C)   Temp src Temporal   SpO2 98 %   O2 Device None (Room air)       Current:/85   Pulse 78   Temp 98.5 °F (3

## 2020-12-07 ENCOUNTER — TELEMEDICINE (OUTPATIENT)
Dept: ENDOCRINOLOGY CLINIC | Facility: CLINIC | Age: 55
End: 2020-12-07
Payer: COMMERCIAL

## 2020-12-07 DIAGNOSIS — E11.65 TYPE 2 DIABETES MELLITUS WITH HYPERGLYCEMIA, WITHOUT LONG-TERM CURRENT USE OF INSULIN (HCC): ICD-10-CM

## 2020-12-07 DIAGNOSIS — E78.5 DYSLIPIDEMIA: Primary | ICD-10-CM

## 2020-12-07 PROCEDURE — 99213 OFFICE O/P EST LOW 20 MIN: CPT | Performed by: INTERNAL MEDICINE

## 2020-12-07 RX ORDER — DULAGLUTIDE 1.5 MG/.5ML
1.5 INJECTION, SOLUTION SUBCUTANEOUS WEEKLY
Qty: 6 ML | Refills: 0 | Status: SHIPPED | OUTPATIENT
Start: 2020-12-07 | End: 2020-12-29

## 2020-12-07 NOTE — PROGRESS NOTES
Telehealth outside of [address] Verbal Consent   I conducted a telehealth visit with Rosetta Santoyo today, 12/07/20, which was completed using two-way, real-time interactive audio and video communication.  This has been done in go History of Nephropathy: no     ASSOCIATED COMPLICATIONS:    HTN: no  Hyperlipidemia: no   Coronary Artery Disease:  No   Cerebrovascular Disease:no       HOME GLUCOSE READINGS:    Checks sugars once a day  Fasting 100-130  Pre dinner 120-130    No low BG reviewed. See past social history marked as reviewed.     ASSESSMENTS:       REVIEW OF SYSTEMS:  Constitutional: Negative for: weight change, fever, fatigue, cold/heat intolerance  Eyes: Negative for:  Visual changes, proptosis, blurring  ENT: Negative for side effects.     -  Trulicity 0.40--> 1.5  mg q week  No personal or family history of MEN syndrome  Patient counselled regarding side effects including injection site reactions, nausea, vomiting, diarrhea, pancreatitis, gastroparesis and rare side effect

## 2020-12-09 ENCOUNTER — OFFICE VISIT (OUTPATIENT)
Dept: NEUROLOGY | Facility: CLINIC | Age: 55
End: 2020-12-09
Payer: COMMERCIAL

## 2020-12-09 ENCOUNTER — TELEPHONE (OUTPATIENT)
Dept: NEUROLOGY | Facility: CLINIC | Age: 55
End: 2020-12-09

## 2020-12-09 VITALS — HEIGHT: 71 IN | WEIGHT: 195 LBS | BODY MASS INDEX: 27.3 KG/M2

## 2020-12-09 DIAGNOSIS — E11.9 DIABETES MELLITUS TYPE 2 IN NONOBESE (HCC): ICD-10-CM

## 2020-12-09 DIAGNOSIS — M51.26 LUMBAR DISC HERNIATION: ICD-10-CM

## 2020-12-09 DIAGNOSIS — M51.37 DDD (DEGENERATIVE DISC DISEASE), LUMBOSACRAL: ICD-10-CM

## 2020-12-09 DIAGNOSIS — M47.816 FACET SYNDROME, LUMBAR: ICD-10-CM

## 2020-12-09 DIAGNOSIS — G89.29 CHRONIC LEFT-SIDED LOW BACK PAIN WITH LEFT-SIDED SCIATICA: ICD-10-CM

## 2020-12-09 DIAGNOSIS — M47.816 LUMBAR SPONDYLOSIS: ICD-10-CM

## 2020-12-09 DIAGNOSIS — M54.59 LUMBAR TRIGGER POINT SYNDROME: ICD-10-CM

## 2020-12-09 DIAGNOSIS — M54.59 MECHANICAL LOW BACK PAIN: Primary | ICD-10-CM

## 2020-12-09 DIAGNOSIS — M48.061 LUMBAR FORAMINAL STENOSIS: ICD-10-CM

## 2020-12-09 DIAGNOSIS — M54.16 LUMBAR RADICULOPATHY, ACUTE: ICD-10-CM

## 2020-12-09 DIAGNOSIS — M47.816 LUMBAR FACET JOINT SYNDROME: ICD-10-CM

## 2020-12-09 DIAGNOSIS — M79.10 MYALGIA: ICD-10-CM

## 2020-12-09 DIAGNOSIS — M48.062 SPINAL STENOSIS OF LUMBAR REGION WITH NEUROGENIC CLAUDICATION: ICD-10-CM

## 2020-12-09 DIAGNOSIS — M79.18 LEFT BUTTOCK PAIN: ICD-10-CM

## 2020-12-09 DIAGNOSIS — M54.42 CHRONIC LEFT-SIDED LOW BACK PAIN WITH LEFT-SIDED SCIATICA: ICD-10-CM

## 2020-12-09 PROCEDURE — 99214 OFFICE O/P EST MOD 30 MIN: CPT | Performed by: PHYSICAL MEDICINE & REHABILITATION

## 2020-12-09 PROCEDURE — 3008F BODY MASS INDEX DOCD: CPT | Performed by: PHYSICAL MEDICINE & REHABILITATION

## 2020-12-09 RX ORDER — NAPROXEN 500 MG/1
TABLET ORAL
COMMUNITY
Start: 2020-11-17 | End: 2021-08-31

## 2020-12-09 RX ORDER — DIAZEPAM 5 MG/1
5 TABLET ORAL 2 TIMES DAILY PRN
COMMUNITY
Start: 2020-11-17 | End: 2021-02-17

## 2020-12-09 NOTE — PATIENT INSTRUCTIONS
1) My office will call you to schedule the L5-S1 ILESI under local anesthesia once the procedure is approved by your insurance carrier. 2) Follow up with me 2 weeks after the procedure.

## 2020-12-09 NOTE — TELEPHONE ENCOUNTER
Contacted James CHIN at [facility] Case/Reference # [id number] to initiate authorization for L5-S1 ILESI CPT 97225 dx:M54.5, M48.061, M54.16 and M47.816 to be done at [facility].   Eugenie requested clinicals-clinicals faxed to [phone]    Status: pending clini

## 2020-12-09 NOTE — PROGRESS NOTES
[address]  Progress Note    CHIEF COMPLAINT:  Patient presents with:  Back Pain: Pt states that he has mid back pain.  somtimes it will travel to the lower back or up to the base of his head and neck area HYDROcodone-acetaminophen (NORCO)  MG Oral Tab Take 1 tablet by mouth every 6 (six) hours as needed for Pain. 45 tablet 0   • Empagliflozin (JARDIANCE) 25 MG Oral Tab Take 1 tablet by mouth daily.  90 tablet 0   • Omeprazole 40 MG Oral Capsule Delayed ulcerations  Heart: peripheral pulses intact. Normal capillary refill. Lungs: Non-labored respirations  Abdomen: No abdominal guarding  Extremities: No lower extremity edema bilaterally   Skin: No lesions noted.    Cognition: alert & oriented x 3, attenti Final   • ALT 12/05/2020 20  16 - 61 U/L Final   • AST 12/05/2020 12* 15 - 37 U/L Final   • Alkaline Phosphatase 12/05/2020 79  45 - 117 U/L Final   • Bilirubin, Total 12/05/2020 0.8  0.1 - 2.0 mg/dL Final   • Total Protein 12/05/2020 7.9  6.4 - 8.2 g/dL F Final   • Lymphocyte Absolute 10/29/2020 3.07  1.00 - 4.00 x10(3) uL Final   • Monocyte Absolute 10/29/2020 0.65  0.10 - 1.00 x10(3) uL Final   • Eosinophil Absolute 10/29/2020 0.17  0.00 - 0.70 x10(3) uL Final   • Basophil Absolute 10/29/2020 0.03  0.00 - lipomatosis. Effacement of the ventral thecal sac with up to moderate spinal canal stenosis; no neural foraminal compromise  L3-L4:   Diffuse disc bulge, which is eccentric to the left, with superimposed central and left paracentral disc protrusions.   Ozzie DISC SPACES: Disc spaces are relatively preserved. Small endplate osteophytes at C5-6. PREVERTEBRAL SOFT TISSUES: Negative. OBLIQUE VIEWS: Evaluation of right-sided foramina slightly limited by over obliquity.   Hypertrophied superior articular proce procedure  Discharge Instructions were provided as documented in AVS summary. The patient was in agreement with the assessment and plan. All questions were answered. There were no barriers to learning.        Mechanical low back pain  (primary encounter

## 2020-12-12 RX ORDER — LISINOPRIL 2.5 MG/1
TABLET ORAL
Qty: 90 TABLET | Refills: 3 | Status: SHIPPED | OUTPATIENT
Start: 2020-12-12 | End: 2021-10-31

## 2020-12-17 NOTE — TELEPHONE ENCOUNTER
•  metFORMIN HCl 1000 MG Oral Tab, Take 1 tablet (1,000 mg total) by mouth 2 (two) times daily with meals.  [place] 1 tableta por margoth oropeza al conrad con las comidas., Disp: 180 tablet, Rfl: 1

## 2020-12-17 NOTE — TELEPHONE ENCOUNTER
Contacted Deirdre GARCIA at [facility] who states L5-S1 ILESI CPT [id number] has been Approved effective 12/10/20-6/10/2021 authorization # [id number]    Routing to clinical staff to schedule at [facility]

## 2020-12-17 NOTE — TELEPHONE ENCOUNTER
Patient has been scheduled for a L5-S1 ILESI under local anesthesia on 12/28/20 at the [facility]. Medications and allergies reviewed.  Patient informed we will need verbal or written authorization from patients Primary Care Physician/Cardiologist to hold blood t

## 2020-12-24 ENCOUNTER — PATIENT MESSAGE (OUTPATIENT)
Dept: ENDOCRINOLOGY CLINIC | Facility: CLINIC | Age: 55
End: 2020-12-24

## 2020-12-24 NOTE — TELEPHONE ENCOUNTER
From: Nai Kramer  To: Luis M Pompa MD  Sent: 12/24/2020 9:51 AM CST  Subject: Non-Urgent Medical Question    Good morning govind only to let you know I have a injection on my back monday you tell me to let you know tks

## 2020-12-27 NOTE — TELEPHONE ENCOUNTER
I am assuming he means a steroid injection.    I see he his apt is at 11:45 am on 12/28/2020  Please call around 2 pm for BG   He should increase his truclity for the week  Will decide about rest based on BG  Thanks

## 2020-12-28 ENCOUNTER — OFFICE VISIT (OUTPATIENT)
Dept: SURGERY | Facility: CLINIC | Age: 55
End: 2020-12-28

## 2020-12-28 DIAGNOSIS — M54.42 CHRONIC LEFT-SIDED LOW BACK PAIN WITH LEFT-SIDED SCIATICA: ICD-10-CM

## 2020-12-28 DIAGNOSIS — M51.26 LUMBAR DISC HERNIATION: ICD-10-CM

## 2020-12-28 DIAGNOSIS — M54.16 LUMBAR RADICULOPATHY, ACUTE: Primary | ICD-10-CM

## 2020-12-28 DIAGNOSIS — M51.26 BULGE OF LUMBAR DISC WITHOUT MYELOPATHY: ICD-10-CM

## 2020-12-28 DIAGNOSIS — G89.29 CHRONIC LEFT-SIDED LOW BACK PAIN WITH LEFT-SIDED SCIATICA: ICD-10-CM

## 2020-12-28 DIAGNOSIS — M48.061 LUMBAR FORAMINAL STENOSIS: ICD-10-CM

## 2020-12-28 NOTE — PROCEDURES
Vasu BAILEY 7.    LUMBAR INTERLAMINAR  NAME:  Christian Jarrett    MR #:    [id number]  :  1965     PHYSICIAN:  Chelsi Kingston        Operative Report    DATE OF PROCEDURE: 2020     PREOPERATIVE DIAGNOSES: removed. The patient's skin was cleaned. A Band-Aid was applied. The patient was transferred to the cart and into [facility]. The patient was given discharge instructions and will follow up in the clinic as scheduled.   Throughout the whole procedure, the henri

## 2020-12-28 NOTE — TELEPHONE ENCOUNTER
Spoke with Christian:    Relayed MD message below. Patient will send mychart message to follow up regarding his BG readings.

## 2020-12-28 NOTE — TELEPHONE ENCOUNTER
Since Bg is good CPM  Check before meals and bedtime for the next two days  Call with Bg on wed, sooenr if over 250 persistently  Thanks

## 2020-12-28 NOTE — TELEPHONE ENCOUNTER
Spoke with Christian:    Patient states he injected Trulicity 5.9GW yesterday. Will follow up with patient this afternoon regarding his BG readings after the steroid injection this morning.

## 2020-12-29 ENCOUNTER — PATIENT MESSAGE (OUTPATIENT)
Dept: ENDOCRINOLOGY CLINIC | Facility: CLINIC | Age: 55
End: 2020-12-29

## 2020-12-29 RX ORDER — DULAGLUTIDE 3 MG/.5ML
3 INJECTION, SOLUTION SUBCUTANEOUS WEEKLY
Qty: 6 ML | Refills: 0 | Status: SHIPPED | OUTPATIENT
Start: 2020-12-29 | End: 2021-02-03

## 2020-12-29 NOTE — TELEPHONE ENCOUNTER
From: Du Gautam  To: Jaqui Hill MD  Sent: 12/29/2020 1:52 PM CST  Subject: Non-Urgent Medical Question    Tengo la azucar en 340 todo conrad que puedo hacer para [place]

## 2020-12-29 NOTE — TELEPHONE ENCOUNTER
Let us increase trulicity to 3 mg q week  Can take extra dose of 1.5 mg now   From next injection take 3 mg dose ( he can use the 1.5 mg pens, I also sent prescription for 3 mg dose)   Check BG and call if they do not go under 200 in the next 2-3 days.  Eneida

## 2020-12-29 NOTE — TELEPHONE ENCOUNTER
rn called patient in [address] [facility] and gave him message by dr Jessie Sinclair, patient verbalized understanding of instructions and had no further questions

## 2020-12-29 NOTE — TELEPHONE ENCOUNTER
rn called patient states he went to see dr Ayala Hoyos and had a steroid injection yesterday   And his blood sugar have been high  Last night 350  This am 201 fasting  After bfst 1030 a 392  States he ambulated and had fluids but blood sugar dropped 350  Now : 2

## 2021-01-06 ENCOUNTER — TELEPHONE (OUTPATIENT)
Dept: NEUROLOGY | Facility: CLINIC | Age: 56
End: 2021-01-06

## 2021-01-06 NOTE — TELEPHONE ENCOUNTER
Spoke with patient and he states that he initially felt improvement but is now having some discomfort. Patient will f/u 01/08/2021 virtually.

## 2021-01-17 ENCOUNTER — PATIENT MESSAGE (OUTPATIENT)
Dept: ENDOCRINOLOGY CLINIC | Facility: CLINIC | Age: 56
End: 2021-01-17

## 2021-01-17 DIAGNOSIS — E11.69 TYPE 2 DIABETES MELLITUS WITH OTHER SPECIFIED COMPLICATION, WITHOUT LONG-TERM CURRENT USE OF INSULIN (HCC): ICD-10-CM

## 2021-01-17 RX ORDER — EMPAGLIFLOZIN 25 MG/1
1 TABLET, FILM COATED ORAL DAILY
Qty: 90 TABLET | Refills: 0 | Status: SHIPPED | OUTPATIENT
Start: 2021-01-17 | End: 2021-04-27

## 2021-01-17 NOTE — TELEPHONE ENCOUNTER
From: Nai Moment  To: Luis M Pompa MD  Sent: 1/17/2021 10:36 AM CST  Subject: Non-Urgent Medical Question    I'm need you to order more jardiance medicine please here at [phone]  in [facility]

## 2021-02-03 ENCOUNTER — PATIENT MESSAGE (OUTPATIENT)
Dept: ENDOCRINOLOGY CLINIC | Facility: CLINIC | Age: 56
End: 2021-02-03

## 2021-02-03 NOTE — TELEPHONE ENCOUNTER
From: Jm Saleh  To: Rena Rao MD  Sent: 2/3/2021 12:00 PM CST  Subject: Non-Urgent Medical Question    Any answer on my medicine?

## 2021-02-04 NOTE — TELEPHONE ENCOUNTER
From: Deonna Najera  To: King Jay MD  Sent: 2/3/2021 3:43 PM CST  Subject: Non-Urgent Medical Question    Aqui ala [facility] in [place] tks

## 2021-02-13 ENCOUNTER — HOSPITAL ENCOUNTER (OUTPATIENT)
Dept: GENERAL RADIOLOGY | Age: 56
Discharge: HOME | End: 2021-02-13
Attending: FAMILY MEDICINE
Payer: COMMERCIAL

## 2021-02-13 DIAGNOSIS — R59.0 ENLARGED LYMPH NODE IN NECK: ICD-10-CM

## 2021-02-13 PROCEDURE — 71046 X-RAY EXAM CHEST 2 VIEWS: CPT | Performed by: FAMILY MEDICINE

## 2021-02-16 ENCOUNTER — TELEPHONE (OUTPATIENT)
Dept: FAMILY MEDICINE CLINIC | Facility: CLINIC | Age: 56
End: 2021-02-16

## 2021-02-17 ENCOUNTER — PATIENT MESSAGE (OUTPATIENT)
Dept: ENDOCRINOLOGY CLINIC | Facility: CLINIC | Age: 56
End: 2021-02-17

## 2021-02-17 NOTE — TELEPHONE ENCOUNTER
Replied to pt via my chart that providers are recommending diabetic patients to get vaccine since they are higher risk for covid

## 2021-02-17 NOTE — TELEPHONE ENCOUNTER
From: Ashley Pederson  To: Josie Reese MD  Sent: 2/17/2021 8:42 AM CST  Subject: Non-Urgent Medical Question    Dra Frederick Holbrook pregunta tengo jaimee mañana para la vaccine del covid 19 hay algun problema de ponermela akhtar espero high resp

## 2021-03-04 DIAGNOSIS — Z23 NEED FOR VACCINATION: ICD-10-CM

## 2021-03-23 ENCOUNTER — OFFICE VISIT (OUTPATIENT)
Dept: ENDOCRINOLOGY CLINIC | Facility: CLINIC | Age: 56
End: 2021-03-23
Payer: COMMERCIAL

## 2021-03-23 VITALS
SYSTOLIC BLOOD PRESSURE: 132 MMHG | BODY MASS INDEX: 27 KG/M2 | HEART RATE: 80 BPM | DIASTOLIC BLOOD PRESSURE: 85 MMHG | WEIGHT: 197 LBS

## 2021-03-23 DIAGNOSIS — E11.69 TYPE 2 DIABETES MELLITUS WITH OTHER SPECIFIED COMPLICATION, WITHOUT LONG-TERM CURRENT USE OF INSULIN (HCC): Primary | ICD-10-CM

## 2021-03-23 DIAGNOSIS — E11.319 DIABETIC RETINOPATHY WITHOUT MACULAR EDEMA ASSOCIATED WITH TYPE 2 DIABETES MELLITUS, UNSPECIFIED LATERALITY, UNSPECIFIED RETINOPATHY SEVERITY (HCC): ICD-10-CM

## 2021-03-23 LAB
CARTRIDGE LOT#: ABNORMAL NUMERIC
GLUCOSE BLOOD: 131
HEMOGLOBIN A1C: 6.7 % (ref 4.3–5.6)
TEST STRIP LOT #: NORMAL NUMERIC

## 2021-03-23 PROCEDURE — 36416 COLLJ CAPILLARY BLOOD SPEC: CPT | Performed by: INTERNAL MEDICINE

## 2021-03-23 PROCEDURE — 99213 OFFICE O/P EST LOW 20 MIN: CPT | Performed by: INTERNAL MEDICINE

## 2021-03-23 PROCEDURE — 83036 HEMOGLOBIN GLYCOSYLATED A1C: CPT | Performed by: INTERNAL MEDICINE

## 2021-03-23 PROCEDURE — 82947 ASSAY GLUCOSE BLOOD QUANT: CPT | Performed by: INTERNAL MEDICINE

## 2021-03-23 PROCEDURE — 3079F DIAST BP 80-89 MM HG: CPT | Performed by: INTERNAL MEDICINE

## 2021-03-23 PROCEDURE — 3075F SYST BP GE 130 - 139MM HG: CPT | Performed by: INTERNAL MEDICINE

## 2021-03-23 PROCEDURE — 3044F HG A1C LEVEL LT 7.0%: CPT | Performed by: INTERNAL MEDICINE

## 2021-03-23 NOTE — PROGRESS NOTES
Return Office Visit     CHIEF COMPLAINT:    DM   Hypertriglyceridemia    HISTORY OF PRESENT ILLNESS:  Margareth Gonzalez is a 48year old male who presents for follow up for DM.      DM HISTORY:  Diagnosed: Around age 29      HISTORY OF D SYSTEMS:  Constitutional: Negative for: weight change, fever, fatigue, cold/heat intolerance  Eyes: Negative for:  Visual changes, proptosis, blurring  ENT: Negative for:  dysphagia, neck swelling, dysphonia  Respiratory: Negative for:  dyspnea, cough  Car week  No personal or family history of MEN syndrome  Patient counselled regarding side effects including injection site reactions, nausea, vomiting, diarrhea, pancreatitis, gastroparesis and rare side effect abdulkadir Abdirizak syndrome.     -  Jardiance 25 mg d

## 2021-03-23 NOTE — PATIENT INSTRUCTIONS
[facility]  Address[facility] [address] [facility]  Phone: [phone]

## 2021-03-29 ENCOUNTER — TELEPHONE (OUTPATIENT)
Dept: ENDOCRINOLOGY CLINIC | Facility: CLINIC | Age: 56
End: 2021-03-29

## 2021-03-29 NOTE — TELEPHONE ENCOUNTER
Prior Authorization request for:     Trulicity 8.07EQ/0.5ZB Pen-injector    KEY: [id number]    Please follow up, thank you.

## 2021-03-30 NOTE — TELEPHONE ENCOUNTER
Medication PA Requested:                                                          CoverMyMeds Used:  Key:  [id number]  Sig: Trulicity 3.86DM/5.2EW Pen-injector efren  DX Code:     E11.69                               CPT code (if applicable):   Case Number/P

## 2021-03-31 NOTE — TELEPHONE ENCOUNTER
Medication PA Requested:  Trulicity 2.16RK/6.2PX Pen-injector                                                          CoverMyMeds Used:  Key:  [id number]  Sig:  Inject 3 mg into the skin once a week.   DX Code:     E11.69            Phoned tai Lemus

## 2021-04-02 ENCOUNTER — PATIENT MESSAGE (OUTPATIENT)
Dept: ENDOCRINOLOGY CLINIC | Facility: CLINIC | Age: 56
End: 2021-04-02

## 2021-04-02 NOTE — TELEPHONE ENCOUNTER
Medication PA Requested:  Trulicity 6.40MM/6.6WK Pen-injector                                                          CoverMyMeds Used:  Key:  [id number]  Sig:  Inject 3 mg into the skin once a week.   DX Code:     E11.69              Phoned [address]

## 2021-04-08 NOTE — TELEPHONE ENCOUNTER
From: Deonna Najera  To: King Jay MD  Sent: 4/2/2021 11:19 AM CDT  Subject: Prescription Question    Que keenan hacer ahora hablar a [facility]?

## 2021-04-26 ENCOUNTER — PATIENT MESSAGE (OUTPATIENT)
Dept: ENDOCRINOLOGY CLINIC | Facility: CLINIC | Age: 56
End: 2021-04-26

## 2021-04-26 DIAGNOSIS — E11.69 TYPE 2 DIABETES MELLITUS WITH OTHER SPECIFIED COMPLICATION, WITHOUT LONG-TERM CURRENT USE OF INSULIN (HCC): ICD-10-CM

## 2021-04-27 RX ORDER — EMPAGLIFLOZIN 25 MG/1
1 TABLET, FILM COATED ORAL DAILY
Qty: 90 TABLET | Refills: 0 | Status: SHIPPED | OUTPATIENT
Start: 2021-04-27 | End: 2021-07-22

## 2021-04-27 RX ORDER — DULAGLUTIDE 3 MG/.5ML
3 INJECTION, SOLUTION SUBCUTANEOUS WEEKLY
Qty: 6 ML | Refills: 0 | Status: SHIPPED | OUTPATIENT
Start: 2021-04-27 | End: 2021-09-09

## 2021-04-27 NOTE — TELEPHONE ENCOUNTER
From: Alonzo Mai  To: Jesusita Oneill MD  Sent: 4/26/2021 8:32 PM CDT  Subject: Non-Urgent Medical Question    Maye faust no tengo medicina jardiance nesecito que me ordene trulicity 4.5 al [employer] aqui en [place] donato

## 2021-05-24 NOTE — TELEPHONE ENCOUNTER
Pharmacy refill request for:      •  metFORMIN HCl 1000 MG Oral Tab, Take 1 tablet (1,000 mg total) by mouth 2 (two) times daily with meals.  [place] 1 tableta por margoth oropeza al conrad con las comidas., Disp: 180 tablet, Rfl: 1    Please follow up, thank y

## 2021-05-25 NOTE — TELEPHONE ENCOUNTER
•  metFORMIN HCl 1000 MG Oral Tab, Take 1 tablet (1,000 mg total) by mouth 2 (two) times daily with meals.  [place] 1 tableta por margoth oropeza al conrad con las comidas., Disp: 180 tablet, Rfl: 0

## 2021-05-27 NOTE — TELEPHONE ENCOUNTER
LOV: 3/23/21  NOV: NO FOLLOW UP APPOINTMENT    •  metFORMIN HCl 1000 MG Oral Tab, Take 1 tablet (1,000 mg total) by mouth 2 (two) times daily with meals.  [place] 1 tableta por margoth oropeza al conrad con las comidas., Disp: 180 tablet, Rfl: 0    WILL PEND ME

## 2021-06-01 ENCOUNTER — OFFICE VISIT (OUTPATIENT)
Dept: FAMILY MEDICINE CLINIC | Facility: CLINIC | Age: 56
End: 2021-06-01
Payer: COMMERCIAL

## 2021-06-01 VITALS
DIASTOLIC BLOOD PRESSURE: 88 MMHG | HEART RATE: 74 BPM | WEIGHT: 193.56 LBS | HEIGHT: 71 IN | BODY MASS INDEX: 27.1 KG/M2 | SYSTOLIC BLOOD PRESSURE: 134 MMHG

## 2021-06-01 DIAGNOSIS — H00.021 HORDEOLUM INTERNUM OF RIGHT UPPER EYELID: ICD-10-CM

## 2021-06-01 DIAGNOSIS — N52.8 OTHER MALE ERECTILE DYSFUNCTION: Primary | ICD-10-CM

## 2021-06-01 PROCEDURE — 3075F SYST BP GE 130 - 139MM HG: CPT | Performed by: FAMILY MEDICINE

## 2021-06-01 PROCEDURE — 3008F BODY MASS INDEX DOCD: CPT | Performed by: FAMILY MEDICINE

## 2021-06-01 PROCEDURE — 3079F DIAST BP 80-89 MM HG: CPT | Performed by: FAMILY MEDICINE

## 2021-06-01 PROCEDURE — 99213 OFFICE O/P EST LOW 20 MIN: CPT | Performed by: FAMILY MEDICINE

## 2021-06-01 NOTE — PROGRESS NOTES
Blood pressure 134/88, pulse 74, height 5' 11\" (1.803 m), weight 193 lb 9 oz (87.8 kg). Patient presents today complaining of bilateral lumps in the upper eyelids. He reports that they are not painful but uncomfortable.   He has had them both 3 weeks o

## 2021-06-05 ENCOUNTER — LAB ENCOUNTER (OUTPATIENT)
Dept: LAB | Age: 56
End: 2021-06-05
Attending: FAMILY MEDICINE
Payer: COMMERCIAL

## 2021-06-05 DIAGNOSIS — N52.8 OTHER MALE ERECTILE DYSFUNCTION: ICD-10-CM

## 2021-06-05 PROCEDURE — 36415 COLL VENOUS BLD VENIPUNCTURE: CPT

## 2021-07-22 ENCOUNTER — TELEPHONE (OUTPATIENT)
Dept: ENDOCRINOLOGY CLINIC | Facility: CLINIC | Age: 56
End: 2021-07-22

## 2021-07-22 DIAGNOSIS — E11.69 TYPE 2 DIABETES MELLITUS WITH OTHER SPECIFIED COMPLICATION, WITHOUT LONG-TERM CURRENT USE OF INSULIN (HCC): ICD-10-CM

## 2021-07-22 RX ORDER — EMPAGLIFLOZIN 25 MG/1
1 TABLET, FILM COATED ORAL DAILY
Qty: 90 TABLET | Refills: 0 | Status: SHIPPED | OUTPATIENT
Start: 2021-07-22 | End: 2021-10-12

## 2021-08-13 ENCOUNTER — TELEPHONE (OUTPATIENT)
Dept: ENDOCRINOLOGY CLINIC | Facility: CLINIC | Age: 56
End: 2021-08-13

## 2021-08-13 NOTE — TELEPHONE ENCOUNTER
Pharmacy refill request for:      •  metFORMIN HCl 1000 MG Oral Tab, Take 1 tablet (1,000 mg total) by mouth 2 (two) times daily with meals.  [place] 1 tableta por margoth oropeza al conrad con las comidas., Disp: 180 tablet, Rfl: 0

## 2021-08-27 ENCOUNTER — TELEPHONE (OUTPATIENT)
Dept: NEUROLOGY | Facility: CLINIC | Age: 56
End: 2021-08-27

## 2021-08-31 ENCOUNTER — TELEPHONE (OUTPATIENT)
Dept: NEUROLOGY | Facility: CLINIC | Age: 56
End: 2021-08-31

## 2021-08-31 ENCOUNTER — OFFICE VISIT (OUTPATIENT)
Dept: NEUROLOGY | Facility: CLINIC | Age: 56
End: 2021-08-31
Payer: COMMERCIAL

## 2021-08-31 VITALS
OXYGEN SATURATION: 98 % | WEIGHT: 190 LBS | BODY MASS INDEX: 26.6 KG/M2 | HEIGHT: 71 IN | HEART RATE: 64 BPM | SYSTOLIC BLOOD PRESSURE: 140 MMHG | DIASTOLIC BLOOD PRESSURE: 78 MMHG

## 2021-08-31 DIAGNOSIS — M79.18 LEFT BUTTOCK PAIN: ICD-10-CM

## 2021-08-31 DIAGNOSIS — M54.42 CHRONIC LEFT-SIDED LOW BACK PAIN WITH LEFT-SIDED SCIATICA: ICD-10-CM

## 2021-08-31 DIAGNOSIS — M54.59 MECHANICAL LOW BACK PAIN: Primary | ICD-10-CM

## 2021-08-31 DIAGNOSIS — M54.16 LUMBAR RADICULOPATHY, ACUTE: ICD-10-CM

## 2021-08-31 DIAGNOSIS — M48.061 LUMBAR FORAMINAL STENOSIS: ICD-10-CM

## 2021-08-31 DIAGNOSIS — M48.062 SPINAL STENOSIS OF LUMBAR REGION WITH NEUROGENIC CLAUDICATION: ICD-10-CM

## 2021-08-31 DIAGNOSIS — M51.26 BULGE OF LUMBAR DISC WITHOUT MYELOPATHY: ICD-10-CM

## 2021-08-31 DIAGNOSIS — M47.816 LUMBAR FACET JOINT SYNDROME: ICD-10-CM

## 2021-08-31 DIAGNOSIS — M47.816 FACET SYNDROME, LUMBAR: ICD-10-CM

## 2021-08-31 DIAGNOSIS — M54.59 LUMBAR TRIGGER POINT SYNDROME: ICD-10-CM

## 2021-08-31 DIAGNOSIS — M51.26 LUMBAR DISC HERNIATION: ICD-10-CM

## 2021-08-31 DIAGNOSIS — M51.37 DDD (DEGENERATIVE DISC DISEASE), LUMBOSACRAL: ICD-10-CM

## 2021-08-31 DIAGNOSIS — M79.10 MYALGIA: ICD-10-CM

## 2021-08-31 DIAGNOSIS — E11.9 DIABETES MELLITUS TYPE 2 IN NONOBESE (HCC): ICD-10-CM

## 2021-08-31 DIAGNOSIS — M47.816 LUMBAR SPONDYLOSIS: ICD-10-CM

## 2021-08-31 DIAGNOSIS — G89.29 CHRONIC LEFT-SIDED LOW BACK PAIN WITH LEFT-SIDED SCIATICA: ICD-10-CM

## 2021-08-31 PROCEDURE — 3077F SYST BP >= 140 MM HG: CPT | Performed by: PHYSICAL MEDICINE & REHABILITATION

## 2021-08-31 PROCEDURE — 99214 OFFICE O/P EST MOD 30 MIN: CPT | Performed by: PHYSICAL MEDICINE & REHABILITATION

## 2021-08-31 PROCEDURE — 3078F DIAST BP <80 MM HG: CPT | Performed by: PHYSICAL MEDICINE & REHABILITATION

## 2021-08-31 PROCEDURE — 3008F BODY MASS INDEX DOCD: CPT | Performed by: PHYSICAL MEDICINE & REHABILITATION

## 2021-08-31 RX ORDER — METHOCARBAMOL 500 MG/1
1000 TABLET, FILM COATED ORAL 3 TIMES DAILY PRN
COMMUNITY
Start: 2021-08-27

## 2021-08-31 RX ORDER — KETOROLAC TROMETHAMINE 30 MG/ML
INJECTION, SOLUTION INTRAMUSCULAR; INTRAVENOUS
COMMUNITY
Start: 2021-08-27 | End: 2021-10-02

## 2021-08-31 NOTE — PROGRESS NOTES
[address]  Progress Note    CHIEF COMPLAINT:  Patient presents with: Injection: INJ: 12/28/20 ILESI. LOV 12/09/20. Patient is here to f/u after injection. States 80% improvement. Pain 8/10.  He has noticed mellitus without mention of complication, not stated as uncontrolled        SURGICAL HISTORY:  Past Surgical History:   Procedure Laterality Date   • COLONOSCOPY N/A 5/12/2017    Procedure: COLONOSCOPY;  Surgeon: Mikael Hamlin MD;  Location: [address] [place] [address] SYSTEMS:   Review of Systems   Constitutional: Negative. HENT: Negative. Eyes: Negative. Respiratory: Negative. Cardiovascular: Negative. Gastrointestinal: Negative. Genitourinary: Negative. Musculoskeletal: As per HPI  Skin: Negative. bilaterally to the ipsilateral low back  LESLIE: Negative  Straight leg raise: negative for radicular pain symptoms  Slump test: negative for pain symptoms or radicular pain symptoms        Gait:  Normal    Data  [facility] Lab Encounter on 06/05/2021   Component up to moderate spinal canal stenosis; no neural foraminal compromise   L3-L4: Diffuse disc bulge, which is eccentric to the left, with superimposed central and left paracentral disc protrusions.  Dorsal epidural lipomatosis with ligamentum flavum redundanc epidural steroid injection. I have also ordered a new MRI of the lumbar spine which she can perform prior to or after the injections. He should stop taking Naprosyn and continue the ketorolac.   I would like for him to follow-up with me 2 weeks after the

## 2021-08-31 NOTE — TELEPHONE ENCOUNTER
Contacted [employer] onlineto initiate authorization for L-Spine MRI CPT [id number] to be done at [address] [place] [address].     Status: Approved with order #[id number] valid 8/31/21-9/28/21    LM informing patient of the above also sent [employer] message    Contacted Marsha at [facility] Case/Referenc

## 2021-08-31 NOTE — PATIENT INSTRUCTIONS
1) My office will call you to schedule the BILATERAL L5 TFESI under local anesthesia once the procedure is approved by your insurance carrier. 2) My office will call you once the MRI is approved by your insurance.  You should then schedule the MRI and ca

## 2021-09-02 ENCOUNTER — HOSPITAL ENCOUNTER (OUTPATIENT)
Dept: MRI IMAGING | Age: 56
Discharge: HOME | End: 2021-09-02
Attending: PHYSICAL MEDICINE & REHABILITATION
Payer: COMMERCIAL

## 2021-09-02 DIAGNOSIS — E11.9 DIABETES MELLITUS TYPE 2 IN NONOBESE (HCC): ICD-10-CM

## 2021-09-02 DIAGNOSIS — M48.062 SPINAL STENOSIS OF LUMBAR REGION WITH NEUROGENIC CLAUDICATION: ICD-10-CM

## 2021-09-02 DIAGNOSIS — M51.26 LUMBAR DISC HERNIATION: ICD-10-CM

## 2021-09-02 DIAGNOSIS — M54.16 LUMBAR RADICULOPATHY, ACUTE: ICD-10-CM

## 2021-09-02 DIAGNOSIS — M51.37 DDD (DEGENERATIVE DISC DISEASE), LUMBOSACRAL: ICD-10-CM

## 2021-09-02 DIAGNOSIS — M54.42 CHRONIC LEFT-SIDED LOW BACK PAIN WITH LEFT-SIDED SCIATICA: ICD-10-CM

## 2021-09-02 DIAGNOSIS — M47.816 LUMBAR SPONDYLOSIS: ICD-10-CM

## 2021-09-02 DIAGNOSIS — M54.59 MECHANICAL LOW BACK PAIN: ICD-10-CM

## 2021-09-02 DIAGNOSIS — M51.26 BULGE OF LUMBAR DISC WITHOUT MYELOPATHY: ICD-10-CM

## 2021-09-02 DIAGNOSIS — M47.816 LUMBAR FACET JOINT SYNDROME: ICD-10-CM

## 2021-09-02 DIAGNOSIS — M79.18 LEFT BUTTOCK PAIN: ICD-10-CM

## 2021-09-02 DIAGNOSIS — M54.59 LUMBAR TRIGGER POINT SYNDROME: ICD-10-CM

## 2021-09-02 DIAGNOSIS — M47.816 FACET SYNDROME, LUMBAR: ICD-10-CM

## 2021-09-02 DIAGNOSIS — G89.29 CHRONIC LEFT-SIDED LOW BACK PAIN WITH LEFT-SIDED SCIATICA: ICD-10-CM

## 2021-09-02 DIAGNOSIS — M48.061 LUMBAR FORAMINAL STENOSIS: ICD-10-CM

## 2021-09-02 DIAGNOSIS — M79.10 MYALGIA: ICD-10-CM

## 2021-09-02 PROCEDURE — 72148 MRI LUMBAR SPINE W/O DYE: CPT | Performed by: PHYSICAL MEDICINE & REHABILITATION

## 2021-09-08 NOTE — TELEPHONE ENCOUNTER
Contacted Marsha at [facility] who states Bilateral L5 TFESIs is still pending and response may take up to 15 days.

## 2021-09-09 RX ORDER — DULAGLUTIDE 3 MG/.5ML
INJECTION, SOLUTION SUBCUTANEOUS
Qty: 6 ML | Refills: 0 | Status: SHIPPED | OUTPATIENT
Start: 2021-09-09 | End: 2021-10-12

## 2021-09-09 NOTE — TELEPHONE ENCOUNTER
Contacted Paul Yeager at [facility] who states Bilateral L5 TFESIs are Approved with authorization #[id number] valid 8/31/21-2/28/22

## 2021-09-10 NOTE — TELEPHONE ENCOUNTER
Patient has been scheduled for Bilateral L5 transforaminal epidural steroid injections on 9/13/21 at the [facility] with Dr. Ruth Santizo.   -Anesthesia type: Local.  -If receiving MAC or IVC sedation patient will need to get COVID tested 3 days prior even if already v

## 2021-09-13 ENCOUNTER — OFFICE VISIT (OUTPATIENT)
Dept: SURGERY | Facility: CLINIC | Age: 56
End: 2021-09-13

## 2021-09-13 DIAGNOSIS — M54.16 LUMBAR RADICULOPATHY, ACUTE: Primary | ICD-10-CM

## 2021-09-13 DIAGNOSIS — M51.26 LUMBAR DISC HERNIATION: ICD-10-CM

## 2021-09-13 DIAGNOSIS — G89.29 CHRONIC LEFT-SIDED LOW BACK PAIN WITH LEFT-SIDED SCIATICA: ICD-10-CM

## 2021-09-13 DIAGNOSIS — M51.26 BULGE OF LUMBAR DISC WITHOUT MYELOPATHY: ICD-10-CM

## 2021-09-13 DIAGNOSIS — M48.061 LUMBAR FORAMINAL STENOSIS: ICD-10-CM

## 2021-09-13 DIAGNOSIS — M54.42 CHRONIC LEFT-SIDED LOW BACK PAIN WITH LEFT-SIDED SCIATICA: ICD-10-CM

## 2021-09-13 PROCEDURE — 64483 NJX AA&/STRD TFRM EPI L/S 1: CPT | Performed by: PHYSICAL MEDICINE & REHABILITATION

## 2021-09-13 NOTE — PROCEDURES
Vasu Morris [address].    BILATERAL LUMBAR TRANSFORAMINAL   NAME:  Fabian Lofton    MR #:    [id number] :  1965     PHYSICIAN:  Marie Agosto MD        Operative Report    DATE OF PROCEDURE: 2021   PREOPERATIVE DIAG patient was injected with a 4 cc solution of 2 cc of 6 mg/cc of Betamethasone and 2 cc of 1% PF lidocaine without epinephrine. After this, the needle was removed.   Then, the fluoroscopy was right anterior obliqued opening up the left L5-S1 intervertebral

## 2021-10-02 ENCOUNTER — TELEMEDICINE (OUTPATIENT)
Dept: PHYSICAL MEDICINE AND REHAB | Facility: CLINIC | Age: 56
End: 2021-10-02

## 2021-10-02 DIAGNOSIS — M51.26 BULGE OF LUMBAR DISC WITHOUT MYELOPATHY: ICD-10-CM

## 2021-10-02 DIAGNOSIS — M47.816 LUMBAR SPONDYLOSIS: ICD-10-CM

## 2021-10-02 DIAGNOSIS — M51.16 LUMBAR DISC HERNIATION WITH RADICULOPATHY: ICD-10-CM

## 2021-10-02 DIAGNOSIS — M54.59 LUMBAR TRIGGER POINT SYNDROME: ICD-10-CM

## 2021-10-02 DIAGNOSIS — M48.061 LUMBAR FORAMINAL STENOSIS: ICD-10-CM

## 2021-10-02 DIAGNOSIS — M54.59 MECHANICAL LOW BACK PAIN: ICD-10-CM

## 2021-10-02 DIAGNOSIS — M51.37 DDD (DEGENERATIVE DISC DISEASE), LUMBOSACRAL: ICD-10-CM

## 2021-10-02 DIAGNOSIS — M79.10 MYALGIA: Primary | ICD-10-CM

## 2021-10-02 DIAGNOSIS — M47.816 FACET SYNDROME, LUMBAR: ICD-10-CM

## 2021-10-02 PROCEDURE — 99214 OFFICE O/P EST MOD 30 MIN: CPT | Performed by: PHYSICAL MEDICINE & REHABILITATION

## 2021-10-02 RX ORDER — NAPROXEN 500 MG/1
500 TABLET ORAL 2 TIMES DAILY WITH MEALS
Qty: 60 TABLET | Refills: 0 | Status: SHIPPED | OUTPATIENT
Start: 2021-10-02

## 2021-10-02 NOTE — PROGRESS NOTES
[address] Fanny Lombardi  Video Visit Progress Note      Telehealth outside of [address] Verbal Consent   I conducted a telehealth visit with Nai Kramer today, 10/02/21, which was completed bilateral low back pain (left greater than right) with radiation to the bilateral buttocks. He is status post bilateral L5 transforaminal epidural steroid injection on September 13 when he 22 with about 80% improvement in his symptoms.   He rates his pain by mouth 3 (three) times daily as needed. • JARDIANCE 25 MG Oral Tab TAKE 1 TABLET BY MOUTH DAILY 90 tablet 0   • metFORMIN HCl 1000 MG Oral Tab Take 1 tablet (1,000 mg total) by mouth 2 (two) times daily with meals.  Prasanna Martinez LUMBAR (CPT=72148)     COMPARISON:  None.      INDICATIONS:  M51.26 Bulge of lumbar disc without myelopathy M47.816 Lumbar spondylosis M51.26 Lumbar disc herniation M47.816 Facet syndrome, lumbar M51.37 DDD (degenerative*     TECHNIQUE:  Multiplanar T1 and diffuse disc bulge with a superimposed broad-based central disc protrusion. Moderate to severe spinal canal stenosis. Bilateral subarticular zone   narrowing. Mild to moderate bilateral foraminal narrowing.      L5-S1:  Mild to moderate facet hypertrophy documented in AVS summary. The patient was in agreement with the assessment and plan. All questions were answered. There were no barriers to learning.     We discussed that a telemedicine visit is in place of an office visit; however, this limits the reta

## 2021-10-02 NOTE — PATIENT INSTRUCTIONS
1) Tylenol 500-1000 mg every 6-8 hours as needed for pain. No more than 3000 mg daily. 2) Take Naprosyn 500 mg 1 tablet twice per day with food for the next two weeks and then as needed but no more than 2 tablets per day.  Do not take with any other NSAID

## 2021-10-12 ENCOUNTER — OFFICE VISIT (OUTPATIENT)
Dept: ENDOCRINOLOGY CLINIC | Facility: CLINIC | Age: 56
End: 2021-10-12
Payer: COMMERCIAL

## 2021-10-12 VITALS
DIASTOLIC BLOOD PRESSURE: 83 MMHG | BODY MASS INDEX: 27 KG/M2 | SYSTOLIC BLOOD PRESSURE: 122 MMHG | WEIGHT: 193 LBS | HEART RATE: 69 BPM

## 2021-10-12 DIAGNOSIS — Z13.29 THYROID DISORDER SCREENING: ICD-10-CM

## 2021-10-12 DIAGNOSIS — E11.69 TYPE 2 DIABETES MELLITUS WITH OTHER SPECIFIED COMPLICATION, WITHOUT LONG-TERM CURRENT USE OF INSULIN (HCC): Primary | ICD-10-CM

## 2021-10-12 DIAGNOSIS — E78.5 DYSLIPIDEMIA: ICD-10-CM

## 2021-10-12 PROCEDURE — 3074F SYST BP LT 130 MM HG: CPT | Performed by: INTERNAL MEDICINE

## 2021-10-12 PROCEDURE — 83036 HEMOGLOBIN GLYCOSYLATED A1C: CPT | Performed by: INTERNAL MEDICINE

## 2021-10-12 PROCEDURE — 36416 COLLJ CAPILLARY BLOOD SPEC: CPT | Performed by: INTERNAL MEDICINE

## 2021-10-12 PROCEDURE — 3079F DIAST BP 80-89 MM HG: CPT | Performed by: INTERNAL MEDICINE

## 2021-10-12 PROCEDURE — 82947 ASSAY GLUCOSE BLOOD QUANT: CPT | Performed by: INTERNAL MEDICINE

## 2021-10-12 PROCEDURE — 99214 OFFICE O/P EST MOD 30 MIN: CPT | Performed by: INTERNAL MEDICINE

## 2021-10-12 RX ORDER — DULAGLUTIDE 3 MG/.5ML
3 INJECTION, SOLUTION SUBCUTANEOUS WEEKLY
Qty: 6 ML | Refills: 0 | Status: SHIPPED | OUTPATIENT
Start: 2021-10-12 | End: 2021-12-14

## 2021-10-12 RX ORDER — EMPAGLIFLOZIN 25 MG/1
1 TABLET, FILM COATED ORAL DAILY
Qty: 90 TABLET | Refills: 0 | Status: SHIPPED | OUTPATIENT
Start: 2021-10-12 | End: 2021-12-14

## 2021-10-12 NOTE — PROGRESS NOTES
Return Office Visit     CHIEF COMPLAINT:    DM   Hypertriglyceridemia    HISTORY OF PRESENT ILLNESS:  Freddie Mcdonnell is a 55year old male who presents for follow up for DM.      DM HISTORY:  Diagnosed: Around age 29      HISTORY OF D 15 tablet 3         ALLERGY:  No Known Allergies    PAST MEDICAL, SOCIAL AND FAMILY HISTORY:  See past medical history marked as reviewed. See past surgical history marked as reviewed. See past family history marked as reviewed.   See past social history implications of uncontrolled diabetes including Diabetic ketoacidosis and various micro vascular and macrovascular complications. a).  Medications:  - c/w Metformin 1000 mg BID  No GI side effects.     -  Trulicity  3  mg q week  No personal or family hi

## 2021-10-13 ENCOUNTER — TELEPHONE (OUTPATIENT)
Dept: ENDOCRINOLOGY CLINIC | Facility: CLINIC | Age: 56
End: 2021-10-13

## 2021-10-13 ENCOUNTER — LAB ENCOUNTER (OUTPATIENT)
Dept: LAB | Age: 56
End: 2021-10-13
Attending: INTERNAL MEDICINE
Payer: COMMERCIAL

## 2021-10-13 DIAGNOSIS — E78.5 DYSLIPIDEMIA: Primary | ICD-10-CM

## 2021-10-13 DIAGNOSIS — Z13.29 THYROID DISORDER SCREENING: ICD-10-CM

## 2021-10-13 DIAGNOSIS — E11.69 TYPE 2 DIABETES MELLITUS WITH OTHER SPECIFIED COMPLICATION, WITHOUT LONG-TERM CURRENT USE OF INSULIN (HCC): ICD-10-CM

## 2021-10-13 DIAGNOSIS — E78.5 DYSLIPIDEMIA: ICD-10-CM

## 2021-10-13 PROCEDURE — 82043 UR ALBUMIN QUANTITATIVE: CPT

## 2021-10-13 PROCEDURE — 80053 COMPREHEN METABOLIC PANEL: CPT

## 2021-10-13 PROCEDURE — 36415 COLL VENOUS BLD VENIPUNCTURE: CPT

## 2021-10-13 PROCEDURE — 80061 LIPID PANEL: CPT

## 2021-10-13 PROCEDURE — 85027 COMPLETE CBC AUTOMATED: CPT

## 2021-10-13 PROCEDURE — 82570 ASSAY OF URINE CREATININE: CPT

## 2021-10-13 PROCEDURE — 84443 ASSAY THYROID STIM HORMONE: CPT

## 2021-10-14 NOTE — TELEPHONE ENCOUNTER
Dr. Altaf Rice to patient to relay message below -patient stated understanding and will work on low-fat diet  Patient stated he is NOT taking any cholesterol medication    Please advise -thanks

## 2021-10-14 NOTE — TELEPHONE ENCOUNTER
Kidney function, urine protein normal  Cholesterol including LDL cholesterol is slightly high: please confirm compliance with statin plus low fat diet  Blood counts normal  Thanks

## 2021-10-15 RX ORDER — ATORVASTATIN CALCIUM 20 MG/1
20 TABLET, FILM COATED ORAL NIGHTLY
Qty: 90 TABLET | Refills: 1 | Status: SHIPPED | OUTPATIENT
Start: 2021-10-15

## 2021-10-15 NOTE — TELEPHONE ENCOUNTER
Phoned patient and relayed below message as outlined. He voiced understanding and denied further questions. Statin sent per written order and lab placed per written order.

## 2021-10-15 NOTE — TELEPHONE ENCOUNTER
Let us start atorvastatin 20 mg daily to lower LDL ( LDL affects heart )  SE: muscle cramps, liver enzyme elevation  High TG: low fat diet    Fasting lipid panel in 3 months    Thanks

## 2021-10-31 RX ORDER — LISINOPRIL 2.5 MG/1
TABLET ORAL
Qty: 90 TABLET | Refills: 3 | Status: SHIPPED | OUTPATIENT
Start: 2021-10-31

## 2021-11-06 NOTE — TELEPHONE ENCOUNTER
Pharmacy request refill on:    •  metFORMIN HCl 1000 MG Oral Tab, Take 1 tablet (1,000 mg total) by mouth 2 (two) times daily with meals.  [place] 1 tableta por margoth oropeza al conrad con las comidas., Disp: 180 tablet, Rfl: 0

## 2021-12-14 ENCOUNTER — PATIENT MESSAGE (OUTPATIENT)
Dept: ENDOCRINOLOGY CLINIC | Facility: CLINIC | Age: 56
End: 2021-12-14

## 2021-12-14 DIAGNOSIS — E11.69 TYPE 2 DIABETES MELLITUS WITH OTHER SPECIFIED COMPLICATION, WITHOUT LONG-TERM CURRENT USE OF INSULIN (HCC): ICD-10-CM

## 2021-12-14 RX ORDER — EMPAGLIFLOZIN 25 MG/1
1 TABLET, FILM COATED ORAL DAILY
Qty: 90 TABLET | Refills: 0 | Status: SHIPPED | OUTPATIENT
Start: 2021-12-14

## 2021-12-14 RX ORDER — DULAGLUTIDE 3 MG/.5ML
3 INJECTION, SOLUTION SUBCUTANEOUS WEEKLY
Qty: 6 ML | Refills: 0 | Status: SHIPPED | OUTPATIENT
Start: 2021-12-14

## 2021-12-14 RX ORDER — EMPAGLIFLOZIN 25 MG/1
1 TABLET, FILM COATED ORAL DAILY
Qty: 90 TABLET | Refills: 0 | Status: SHIPPED | OUTPATIENT
Start: 2021-12-14 | End: 2021-12-14

## 2021-12-14 RX ORDER — DULAGLUTIDE 3 MG/.5ML
3 INJECTION, SOLUTION SUBCUTANEOUS WEEKLY
Qty: 6 ML | Refills: 0 | Status: SHIPPED | OUTPATIENT
Start: 2021-12-14 | End: 2021-12-14

## 2021-12-14 NOTE — TELEPHONE ENCOUNTER
From: Marcus Valdes  To: Karen Bass MD  Sent: 12/14/2021 7:33 AM CST  Subject: New insurance     I need medication please can you order trulicity and jardiance tks

## 2021-12-14 NOTE — TELEPHONE ENCOUNTER
From: Jm Saleh  To: Rena Rao MD  Sent: 12/14/2021 7:33 AM CST  Subject: New insurance     I need medication please can you order trulicity and jardiance tks

## 2021-12-15 ENCOUNTER — TELEPHONE (OUTPATIENT)
Dept: ENDOCRINOLOGY CLINIC | Facility: CLINIC | Age: 56
End: 2021-12-15

## 2021-12-15 ENCOUNTER — PATIENT MESSAGE (OUTPATIENT)
Dept: ENDOCRINOLOGY CLINIC | Facility: CLINIC | Age: 56
End: 2021-12-15

## 2021-12-15 NOTE — TELEPHONE ENCOUNTER
•  Dulaglutide (TRULICITY) 3 KR/7.7IL Subcutaneous Solution Pen-injector, Inject 3 mg into the skin once a week., Disp: 6 mL, Rfl: 0    KEY: SANTY

## 2021-12-15 NOTE — TELEPHONE ENCOUNTER
Medication PA Requested:                                                          CoverMyMeds Used:  Key:  Quantity: 6 ml  Day Supply: 90   Sig: trulicity inject 3 mg per week,   DX Code:                                     CPT code (if applicable):   Case

## 2021-12-15 NOTE — TELEPHONE ENCOUNTER
From: Krystal Huffman  To: Meryle Ok, MD  Sent: 12/15/2021 1:17 PM CST  Subject: Tenisha Samuels.  Nesecito hacer algo para mi medication ocupo medicina y no me cubre supuestamente la nueva aseguranza nesecito hablar con usted soon is

## 2021-12-23 NOTE — TELEPHONE ENCOUNTER
Medication PA Requested:         TRULICITY                                                 CoverMyMeds Used:  Key:  Quantity:2 ml  Day Supply: 30  Sig: trulicity0.75 mg inject per week  DX Code:                     Faxed prime pa form with office note/a1c

## 2021-12-27 ENCOUNTER — PATIENT MESSAGE (OUTPATIENT)
Dept: ENDOCRINOLOGY CLINIC | Facility: CLINIC | Age: 56
End: 2021-12-27

## 2021-12-27 NOTE — TELEPHONE ENCOUNTER
From: Fabian Lofton  Sent: 12/27/2021 10:41 AM CST  To: Melida Elliott Clinical Staff  Subject: Manuel kasperdiance trulicity no naeem

## 2021-12-27 NOTE — TELEPHONE ENCOUNTER
Pa response received from [employer]  Approving trulicity 2ml per 28 days for patient. Patient informed.

## 2021-12-28 ENCOUNTER — TELEMEDICINE (OUTPATIENT)
Dept: PHYSICAL MEDICINE AND REHAB | Facility: CLINIC | Age: 56
End: 2021-12-28
Payer: COMMERCIAL

## 2021-12-28 ENCOUNTER — TELEPHONE (OUTPATIENT)
Dept: PHYSICAL MEDICINE AND REHAB | Facility: CLINIC | Age: 56
End: 2021-12-28

## 2021-12-28 DIAGNOSIS — M79.10 MYALGIA: Primary | ICD-10-CM

## 2021-12-28 DIAGNOSIS — M51.26 BULGE OF LUMBAR DISC WITHOUT MYELOPATHY: ICD-10-CM

## 2021-12-28 DIAGNOSIS — M54.59 LUMBAR TRIGGER POINT SYNDROME: ICD-10-CM

## 2021-12-28 DIAGNOSIS — M47.816 FACET SYNDROME, LUMBAR: ICD-10-CM

## 2021-12-28 DIAGNOSIS — M54.59 MECHANICAL LOW BACK PAIN: ICD-10-CM

## 2021-12-28 DIAGNOSIS — M47.816 LUMBAR SPONDYLOSIS: ICD-10-CM

## 2021-12-28 DIAGNOSIS — M48.061 LUMBAR FORAMINAL STENOSIS: ICD-10-CM

## 2021-12-28 DIAGNOSIS — M51.37 DDD (DEGENERATIVE DISC DISEASE), LUMBOSACRAL: ICD-10-CM

## 2021-12-28 DIAGNOSIS — M51.16 LUMBAR DISC HERNIATION WITH RADICULOPATHY: ICD-10-CM

## 2021-12-28 PROCEDURE — 99214 OFFICE O/P EST MOD 30 MIN: CPT | Performed by: PHYSICAL MEDICINE & REHABILITATION

## 2021-12-28 RX ORDER — TRAMADOL HYDROCHLORIDE 50 MG/1
50 TABLET ORAL EVERY 6 HOURS PRN
Qty: 60 TABLET | Refills: 0 | Status: SHIPPED | OUTPATIENT
Start: 2021-12-28

## 2021-12-28 NOTE — TELEPHONE ENCOUNTER
Chris and David, [phone], spoke with Nusrat. She states PA for Trulicity was 31/25/8024-65/26/9524. Auth # [id number].   Call ref # [address], [phone], spoke with Floyd Dejesus, pharmacist, she states she is having trouble with computer

## 2021-12-28 NOTE — PROGRESS NOTES
[address] Fanny Lombardi  Video Visit Progress Note      Telehealth outside of [address] Verbal Consent   I conducted a telehealth visit with Hershel Frankel today, 12/28/21, which was completed than right) with radiation to the bilateral buttocks.   He is status post bilateral L5 transforaminal epidural steroid injection on September 13 where initially he said he was about 80% improved although now he is complaining of continued bilateral low back Solution Pen-injector Inject 3 mg into the skin once a week. 6 mL 0   • metFORMIN HCl 1000 MG Oral Tab Take 1 tablet (1,000 mg total) by mouth 2 (two) times daily with meals. [place] 1 tableta por margoth oropeza al conrad con las comidas.  180 tablet 0   • LIS Status   • Glucose 10/13/2021 112* 70 - 99 mg/dL Final   • Sodium 10/13/2021 139  136 - 145 mmol/L Final   • Potassium 10/13/2021 4.3  3.5 - 5.1 mmol/L Final   • Chloride 10/13/2021 106  98 - 112 mmol/L Final   • CO2 10/13/2021 26.0  21.0 - 32.0 mmol/L Fin 10/13/2021 32.4  26.0 - 34.0 pg Final   • MCHC 10/13/2021 34.2  31.0 - 37.0 g/dL Final   • RDW 10/13/2021 11.6  11.0 - 15.0 % Final   • RDW-SD 10/13/2021 40.5  35.1 - 46.3 fL Final   • PLT 10/13/2021 299.0  150.0 - 450.0 10(3)uL Final   Office Visit on 10/ There is a punctate synovial cyst extending posteriorly into the paraspinal musculature at the left L4-5 facet joint. T12-L1:  Minimal facet hypertrophy. No spinal canal or foraminal narrowing. L1-L2:  Minimal facet hypertrophy.   No spinal canal o 9/02/2021 at 2:35 PM         ASSESSMENT AND PLAN:  The patient is a pleasant 60-year-old male who presents for follow-up of his continued low back pain with radiation down both legs.   I have independently reviewed his MRI where he does have multilevel dege the patient can follow-up in office as well when appropriate. In the meantime, I will be available for telephone and video encounter.         Myalgia  (primary encounter diagnosis)  Facet syndrome, lumbar  Lumbar disc herniation with radiculopathy  Mechani

## 2021-12-28 NOTE — PATIENT INSTRUCTIONS
1) My office will call you to schedule the L5-S1 ILESI under local anesthesia once the procedure is approved by your insurance carrier. 2) Start physical therapy  3) Start tramadol 50 gm every 6 hours as needed for pain.   4) Follow up with me 2 weeks af

## 2021-12-28 NOTE — TELEPHONE ENCOUNTER
Submitted request to [facility] for L5-S1 ILESI CPT [id number] and Physical Therapy CPT [id number]     Status: pending [facility] clinical review

## 2022-01-06 NOTE — TELEPHONE ENCOUNTER
Received notice of Approval from [address] at [facility] for L5-S1 ILESI valid 12/28/21 until 4/3/22 to be done at [facility]    fyi-PT still pending

## 2022-01-10 NOTE — TELEPHONE ENCOUNTER
Patient has been scheduled for L5-S1 ILESI  on 01/19/22 at the [facility] with Pam Amado. -Anesthesia type: LOCAL.   -If receiving MAC or IVC sedation patient will need to get COVID tested 3 days prior even if already vaccinated (order placed by [facility].)  -Patient

## 2022-01-11 NOTE — TELEPHONE ENCOUNTER
LOV 10/12/21. RTC 3-4 months. No f/u. Sent reminder via [facility] [address]. Pended 3 month supply.

## 2022-01-19 ENCOUNTER — OFFICE VISIT (OUTPATIENT)
Dept: SURGERY | Facility: CLINIC | Age: 57
End: 2022-01-19
Payer: COMMERCIAL

## 2022-01-19 DIAGNOSIS — M51.26 LUMBAR DISC HERNIATION: ICD-10-CM

## 2022-01-19 DIAGNOSIS — M51.26 BULGE OF LUMBAR DISC WITHOUT MYELOPATHY: ICD-10-CM

## 2022-01-19 DIAGNOSIS — G89.29 CHRONIC LEFT-SIDED LOW BACK PAIN WITH LEFT-SIDED SCIATICA: ICD-10-CM

## 2022-01-19 DIAGNOSIS — M48.061 LUMBAR FORAMINAL STENOSIS: ICD-10-CM

## 2022-01-19 DIAGNOSIS — M54.16 LUMBAR RADICULOPATHY, ACUTE: Primary | ICD-10-CM

## 2022-01-19 DIAGNOSIS — M54.42 CHRONIC LEFT-SIDED LOW BACK PAIN WITH LEFT-SIDED SCIATICA: ICD-10-CM

## 2022-01-19 PROCEDURE — 62323 NJX INTERLAMINAR LMBR/SAC: CPT | Performed by: PHYSICAL MEDICINE & REHABILITATION

## 2022-01-19 NOTE — PROCEDURES
Vasu BAILEY 7.    LUMBAR INTERLAMINAR  NAME:  Christian Jenkins    MR #:    [id number]  :  1965     PHYSICIAN:  Marisa Ashley        Operative Report    DATE OF PROCEDURE: 2022     PREOPERATIVE DIAGNOSES: removed. The patient's skin was cleaned. A Band-Aid was applied. The patient was transferred to the cart and into [facility]. The patient was given discharge instructions and will follow up in the clinic as scheduled.   Throughout the whole procedure, the henri

## 2022-01-31 NOTE — TELEPHONE ENCOUNTER
Received notice of Approval from [address] at [facility] for PT valid until 4/1/22 to be done at [place]    Patient notified via [facility]

## 2022-02-01 ENCOUNTER — TELEPHONE (OUTPATIENT)
Dept: PHYSICAL THERAPY | Facility: HOSPITAL | Age: 57
End: 2022-02-01

## 2022-02-08 ENCOUNTER — OFFICE VISIT (OUTPATIENT)
Dept: PHYSICAL MEDICINE AND REHAB | Facility: CLINIC | Age: 57
End: 2022-02-08
Payer: COMMERCIAL

## 2022-02-08 ENCOUNTER — TELEPHONE (OUTPATIENT)
Dept: FAMILY MEDICINE CLINIC | Facility: CLINIC | Age: 57
End: 2022-02-08

## 2022-02-08 VITALS
BODY MASS INDEX: 27.63 KG/M2 | SYSTOLIC BLOOD PRESSURE: 140 MMHG | OXYGEN SATURATION: 97 % | HEART RATE: 78 BPM | DIASTOLIC BLOOD PRESSURE: 80 MMHG | WEIGHT: 193 LBS | HEIGHT: 70 IN

## 2022-02-08 DIAGNOSIS — M51.37 DDD (DEGENERATIVE DISC DISEASE), LUMBOSACRAL: ICD-10-CM

## 2022-02-08 DIAGNOSIS — M54.59 LUMBAR TRIGGER POINT SYNDROME: ICD-10-CM

## 2022-02-08 DIAGNOSIS — M79.10 MYALGIA: Primary | ICD-10-CM

## 2022-02-08 DIAGNOSIS — M51.26 BULGE OF LUMBAR DISC WITHOUT MYELOPATHY: ICD-10-CM

## 2022-02-08 DIAGNOSIS — M51.16 LUMBAR DISC HERNIATION WITH RADICULOPATHY: ICD-10-CM

## 2022-02-08 DIAGNOSIS — M54.59 MECHANICAL LOW BACK PAIN: ICD-10-CM

## 2022-02-08 DIAGNOSIS — M47.816 FACET SYNDROME, LUMBAR: ICD-10-CM

## 2022-02-08 DIAGNOSIS — M48.061 LUMBAR FORAMINAL STENOSIS: ICD-10-CM

## 2022-02-08 DIAGNOSIS — M47.816 LUMBAR SPONDYLOSIS: ICD-10-CM

## 2022-02-08 PROCEDURE — 99214 OFFICE O/P EST MOD 30 MIN: CPT | Performed by: PHYSICAL MEDICINE & REHABILITATION

## 2022-02-08 PROCEDURE — 3008F BODY MASS INDEX DOCD: CPT | Performed by: PHYSICAL MEDICINE & REHABILITATION

## 2022-02-08 PROCEDURE — 3077F SYST BP >= 140 MM HG: CPT | Performed by: PHYSICAL MEDICINE & REHABILITATION

## 2022-02-08 PROCEDURE — 3079F DIAST BP 80-89 MM HG: CPT | Performed by: PHYSICAL MEDICINE & REHABILITATION

## 2022-02-08 RX ORDER — TRAMADOL HYDROCHLORIDE 50 MG/1
50 TABLET ORAL EVERY 6 HOURS PRN
Qty: 60 TABLET | Refills: 0 | Status: SHIPPED | OUTPATIENT
Start: 2022-02-08

## 2022-02-08 RX ORDER — TADALAFIL 20 MG/1
20 TABLET ORAL
Qty: 30 TABLET | Refills: 0 | Status: SHIPPED | OUTPATIENT
Start: 2022-02-08

## 2022-02-08 NOTE — TELEPHONE ENCOUNTER
Patient has new [employer] insurance and is requesting a referral for Dr. Alex Hogue. Patient has a follow up appointment scheduled for today at 3pm. Please call patient once referral has been approved.  Thank you

## 2022-02-08 NOTE — PATIENT INSTRUCTIONS
1) My office will call you to schedule the L5-S1 ILESI under local anesthesia once the procedure is approved by your insurance carrier. 2) Start physical therapy  3) Follow up with me 2 weeks after procedure. If limited relief, then would recommend surgical consultation.

## 2022-02-16 ENCOUNTER — TELEPHONE (OUTPATIENT)
Dept: PHYSICAL THERAPY | Facility: HOSPITAL | Age: 57
End: 2022-02-16

## 2022-02-16 ENCOUNTER — TELEPHONE (OUTPATIENT)
Dept: NEUROLOGY | Facility: CLINIC | Age: 57
End: 2022-02-16

## 2022-02-16 NOTE — TELEPHONE ENCOUNTER
Called [facility] to check on status  for authorization of approval of L5-S1 ILESI cpt codes 99 [id number], 96921. under Referral # [id number] initiated on 02/09/22. S/w Rojas Arnett. It is still pending Marked expedite. Will inform Nursing.

## 2022-02-18 NOTE — TELEPHONE ENCOUNTER
Patient has been scheduled for  L5-S1 ILESI  on 03/02/22 at the [facility]  with Dr.Behar.   -Anesthesia type: LOCAL. -If receiving MAC or IVC sedation patient will need to get COVID tested 3 days prior even if already vaccinated (order placed by [address]  -If scheduling [facility] and [facility] covid testing required for all procedures whether patient is vaccinated or not. -Patient informed not to eat or drink anything after midnight the night prior to the procedure, if being sedated. -Patient was advised that if he/she does receive the covid vaccine it needs to be at least 2 weeks before or after the injection. -Medications and allergies reviewed. -Patient reminded to hold NSAIDs (Ibuprofen, ASA, Aleve, Naproxen, Mobic etc.) for 3 days prior to [place]  if BMI is greater than 35. For Cervical injections only hold multivitamins, Vitamin E, Fish Oil, Phentermine (Lomaira) for 7 days prior to injection and NSAIDS. -If patient is receiving MAC/IVCS Phentermine Alyce Benz) will need to be held for 7 days prior to injection.  -If on blood thinner clearance has been received to hold this medication by provider.   -Patient informed he/she will need a  to and from procedure. -[facility] is located in the [facility] 1st floor. Patient may park in the yellow parking. Patient verbalized understanding and agrees with plan.  -----> Scheduled in Epic: Yes  -----> Scheduled in [employer]:  Yes

## 2022-02-18 NOTE — TELEPHONE ENCOUNTER
Called [facility] to verify status  for authorization of approval of L5-S1 ILESI cpt codes 70071, 36802. under Referral # [id number] initiated on 02/09/22. S/w [address] it was approved by [employer] not work in her dept) and it should not have been approved. She approved it today. Will inform Nursing.

## 2022-03-01 ENCOUNTER — OFFICE VISIT (OUTPATIENT)
Dept: PHYSICAL THERAPY | Age: 57
End: 2022-03-01
Attending: PHYSICAL MEDICINE & REHABILITATION
Payer: COMMERCIAL

## 2022-03-01 DIAGNOSIS — M51.37 DDD (DEGENERATIVE DISC DISEASE), LUMBOSACRAL: ICD-10-CM

## 2022-03-01 DIAGNOSIS — M47.816 LUMBAR SPONDYLOSIS: ICD-10-CM

## 2022-03-01 DIAGNOSIS — M51.26 BULGE OF LUMBAR DISC WITHOUT MYELOPATHY: ICD-10-CM

## 2022-03-01 DIAGNOSIS — M54.59 LUMBAR TRIGGER POINT SYNDROME: ICD-10-CM

## 2022-03-01 DIAGNOSIS — M54.59 MECHANICAL LOW BACK PAIN: ICD-10-CM

## 2022-03-01 DIAGNOSIS — M51.16 LUMBAR DISC HERNIATION WITH RADICULOPATHY: ICD-10-CM

## 2022-03-01 DIAGNOSIS — M47.816 FACET SYNDROME, LUMBAR: ICD-10-CM

## 2022-03-01 DIAGNOSIS — M48.061 LUMBAR FORAMINAL STENOSIS: ICD-10-CM

## 2022-03-01 DIAGNOSIS — M79.10 MYALGIA: ICD-10-CM

## 2022-03-01 PROCEDURE — 97161 PT EVAL LOW COMPLEX 20 MIN: CPT | Performed by: PHYSICAL THERAPIST

## 2022-03-01 PROCEDURE — 97140 MANUAL THERAPY 1/> REGIONS: CPT | Performed by: PHYSICAL THERAPIST

## 2022-03-01 PROCEDURE — 97110 THERAPEUTIC EXERCISES: CPT | Performed by: PHYSICAL THERAPIST

## 2022-03-01 PROCEDURE — 97014 ELECTRIC STIMULATION THERAPY: CPT | Performed by: PHYSICAL THERAPIST

## 2022-03-02 ENCOUNTER — OFFICE VISIT (OUTPATIENT)
Dept: SURGERY | Facility: CLINIC | Age: 57
End: 2022-03-02

## 2022-03-02 DIAGNOSIS — M54.42 CHRONIC LEFT-SIDED LOW BACK PAIN WITH LEFT-SIDED SCIATICA: ICD-10-CM

## 2022-03-02 DIAGNOSIS — M48.061 LUMBAR FORAMINAL STENOSIS: ICD-10-CM

## 2022-03-02 DIAGNOSIS — M54.16 LUMBAR RADICULOPATHY, ACUTE: Primary | ICD-10-CM

## 2022-03-02 DIAGNOSIS — M51.26 BULGE OF LUMBAR DISC WITHOUT MYELOPATHY: ICD-10-CM

## 2022-03-02 DIAGNOSIS — M51.26 LUMBAR DISC HERNIATION: ICD-10-CM

## 2022-03-02 DIAGNOSIS — G89.29 CHRONIC LEFT-SIDED LOW BACK PAIN WITH LEFT-SIDED SCIATICA: ICD-10-CM

## 2022-03-02 PROCEDURE — 62323 NJX INTERLAMINAR LMBR/SAC: CPT | Performed by: PHYSICAL MEDICINE & REHABILITATION

## 2022-03-02 NOTE — PATIENT INSTRUCTIONS
Post Injection Instructions     1. Please do not do anything strenuous over the next 24 hours (if you had a knee injection do not walk more than 2 city blocks, do not attend any aerobic classes, do not run, no heavy lifting, no prolong standing). 2. You may resume your day to day activities after your injection. 3. You may experience some mild amount of swelling and discomfort after the procedure. 4. Please ice the area that was injected at least 5-6 times a day (15 minutes) for two days after (this will help prevent worsening pain that sometimes occurs after an injection). 5. Only take tylenol if needed for pain for the first few days. 6. Watch for signs of infection which include redness, warmth, worsening pain, fevers or chills. If you develop any of these signs call the office immediately at [phone]    7. My office will call you in 2 days to check in and see how you are feeling. 8. Follow up with me in the office 2 weeks after your injection. Everyone responds differently to injections, but you can expect your peak effects a few weeks after your last injection. Yonatan Lundberg.  Jennifer Ewing MD  Physical Medicine and Rehabilitation/Sports Medicine  MEDICAL CENTER [facility]

## 2022-03-02 NOTE — PROCEDURES
Vasu [address]    LUMBAR INTERLAMINAR  NAME:  Bhupinder Spears    MR #:    [id number]  :  1965     PHYSICIAN:  Rolan Encarnacion        Operative Report    DATE OF PROCEDURE: 2022     PREOPERATIVE DIAGNOSES: 1. Lumbar radiculopathy, acute    2. Bulge of lumbar disc without myelopathy    3. Lumbar foraminal stenosis    4. Chronic left-sided low back pain with left-sided sciatica    5. Lumbar disc herniation               POSTOPERATIVE DIAGNOSES:   1. Lumbar radiculopathy, acute    2. Bulge of lumbar disc without myelopathy    3. Lumbar foraminal stenosis    4. Chronic left-sided low back pain with left-sided sciatica    5. Lumbar disc herniation               PROCEDURES: L5-S1 interlaminar epidural steroid injection done under fluoroscopic guidance with contrast enhancement. SURGEON: Rolan Encarnacion   ANESTHESIA: Local   INDICATIONS:       OPERATIVE PROCEDURE:  Written consent was obtained from the patient. The patient was brought into the operating room and placed in the prone position on the fluoroscopy table with pillow underneath the chest and shoulders. The patient's skin was cleaned and draped in a normal sterile fashion. Using AP fluoroscopy, all 5 lumbar vertebrae were identified. The left L5 lamina was identified. Skin was anesthetized with 1% PF lidocaine without epinephrine. Then, a 3-1/2 inch, 18-gauge Tuohy needle was inserted and directed towards the left L5 lamina. When it was engaged, it was walked inferiorly and medially until it was felt to drop off the inferiormedial aspect. Then, Omnipaque-240 contrast was used to obtain a good epidurogram indicating correct needle placement. Then, aspiration was performed. No blood, fluid, or air was aspirated. Then, the patient was injected with a 9 cc solution of 3 cc of normal sterile saline and 3 cc of 1% PF lidocaine without epinephrine, and 3 cc of 6 mg/cc of Celestone Soluspan. Then, the needle was removed.   The patient's skin was cleaned. A Band-Aid was applied. The patient was transferred to the cart and into [facility]. The patient was given discharge instructions and will follow up in the clinic as scheduled. Throughout the whole procedure, the patient's pulse oximetry and vital signs were monitored and they remained completely stable. Also, throughout the whole procedure, prior to injection of any medication, aspiration was performed. No blood, fluid, or air was aspirated at anytime. Reed Hassan.  Justen Verma MD, [facility]  Physical Medicine and Rehabilitation/Sports Medicine  MEDICAL [facility]

## 2022-03-03 ENCOUNTER — APPOINTMENT (OUTPATIENT)
Dept: PHYSICAL THERAPY | Age: 57
End: 2022-03-03
Attending: PHYSICAL MEDICINE & REHABILITATION
Payer: COMMERCIAL

## 2022-03-08 ENCOUNTER — OFFICE VISIT (OUTPATIENT)
Dept: PHYSICAL THERAPY | Age: 57
End: 2022-03-08
Attending: PHYSICAL MEDICINE & REHABILITATION
Payer: COMMERCIAL

## 2022-03-08 DIAGNOSIS — M54.59 MECHANICAL LOW BACK PAIN: ICD-10-CM

## 2022-03-08 DIAGNOSIS — M51.16 LUMBAR DISC HERNIATION WITH RADICULOPATHY: ICD-10-CM

## 2022-03-08 DIAGNOSIS — M51.37 DDD (DEGENERATIVE DISC DISEASE), LUMBOSACRAL: ICD-10-CM

## 2022-03-08 DIAGNOSIS — M47.816 LUMBAR SPONDYLOSIS: ICD-10-CM

## 2022-03-08 DIAGNOSIS — M79.10 MYALGIA: ICD-10-CM

## 2022-03-08 DIAGNOSIS — M51.26 BULGE OF LUMBAR DISC WITHOUT MYELOPATHY: ICD-10-CM

## 2022-03-08 DIAGNOSIS — M54.59 LUMBAR TRIGGER POINT SYNDROME: ICD-10-CM

## 2022-03-08 DIAGNOSIS — M48.061 LUMBAR FORAMINAL STENOSIS: ICD-10-CM

## 2022-03-08 DIAGNOSIS — M47.816 FACET SYNDROME, LUMBAR: ICD-10-CM

## 2022-03-08 PROCEDURE — 97110 THERAPEUTIC EXERCISES: CPT | Performed by: PHYSICAL THERAPIST

## 2022-03-08 PROCEDURE — 97140 MANUAL THERAPY 1/> REGIONS: CPT | Performed by: PHYSICAL THERAPIST

## 2022-03-10 ENCOUNTER — OFFICE VISIT (OUTPATIENT)
Dept: PHYSICAL THERAPY | Age: 57
End: 2022-03-10
Attending: PHYSICAL MEDICINE & REHABILITATION
Payer: COMMERCIAL

## 2022-03-10 DIAGNOSIS — M51.26 BULGE OF LUMBAR DISC WITHOUT MYELOPATHY: ICD-10-CM

## 2022-03-10 DIAGNOSIS — M47.816 FACET SYNDROME, LUMBAR: ICD-10-CM

## 2022-03-10 DIAGNOSIS — M54.59 MECHANICAL LOW BACK PAIN: ICD-10-CM

## 2022-03-10 DIAGNOSIS — M79.10 MYALGIA: ICD-10-CM

## 2022-03-10 DIAGNOSIS — M51.16 LUMBAR DISC HERNIATION WITH RADICULOPATHY: ICD-10-CM

## 2022-03-10 DIAGNOSIS — M48.061 LUMBAR FORAMINAL STENOSIS: ICD-10-CM

## 2022-03-10 DIAGNOSIS — M47.816 LUMBAR SPONDYLOSIS: ICD-10-CM

## 2022-03-10 DIAGNOSIS — M54.59 LUMBAR TRIGGER POINT SYNDROME: ICD-10-CM

## 2022-03-10 DIAGNOSIS — M51.37 DDD (DEGENERATIVE DISC DISEASE), LUMBOSACRAL: ICD-10-CM

## 2022-03-10 PROCEDURE — 97110 THERAPEUTIC EXERCISES: CPT | Performed by: PHYSICAL THERAPIST

## 2022-03-10 PROCEDURE — 97140 MANUAL THERAPY 1/> REGIONS: CPT | Performed by: PHYSICAL THERAPIST

## 2022-03-10 PROCEDURE — 97014 ELECTRIC STIMULATION THERAPY: CPT | Performed by: PHYSICAL THERAPIST

## 2022-03-14 RX ORDER — EMPAGLIFLOZIN 25 MG/1
1 TABLET, FILM COATED ORAL DAILY
Qty: 90 TABLET | Refills: 0 | Status: SHIPPED | OUTPATIENT
Start: 2022-03-14

## 2022-03-15 ENCOUNTER — OFFICE VISIT (OUTPATIENT)
Dept: PHYSICAL THERAPY | Age: 57
End: 2022-03-15
Attending: PHYSICAL MEDICINE & REHABILITATION
Payer: COMMERCIAL

## 2022-03-15 DIAGNOSIS — M54.59 LUMBAR TRIGGER POINT SYNDROME: ICD-10-CM

## 2022-03-15 DIAGNOSIS — M51.16 LUMBAR DISC HERNIATION WITH RADICULOPATHY: ICD-10-CM

## 2022-03-15 DIAGNOSIS — M79.10 MYALGIA: ICD-10-CM

## 2022-03-15 DIAGNOSIS — M51.26 BULGE OF LUMBAR DISC WITHOUT MYELOPATHY: ICD-10-CM

## 2022-03-15 DIAGNOSIS — M47.816 LUMBAR SPONDYLOSIS: ICD-10-CM

## 2022-03-15 DIAGNOSIS — M54.59 MECHANICAL LOW BACK PAIN: ICD-10-CM

## 2022-03-15 DIAGNOSIS — M48.061 LUMBAR FORAMINAL STENOSIS: ICD-10-CM

## 2022-03-15 DIAGNOSIS — M51.37 DDD (DEGENERATIVE DISC DISEASE), LUMBOSACRAL: ICD-10-CM

## 2022-03-15 DIAGNOSIS — M47.816 FACET SYNDROME, LUMBAR: ICD-10-CM

## 2022-03-15 PROCEDURE — 97014 ELECTRIC STIMULATION THERAPY: CPT | Performed by: PHYSICAL THERAPIST

## 2022-03-15 PROCEDURE — 97110 THERAPEUTIC EXERCISES: CPT | Performed by: PHYSICAL THERAPIST

## 2022-03-15 PROCEDURE — 97140 MANUAL THERAPY 1/> REGIONS: CPT | Performed by: PHYSICAL THERAPIST

## 2022-03-17 ENCOUNTER — OFFICE VISIT (OUTPATIENT)
Dept: PHYSICAL THERAPY | Age: 57
End: 2022-03-17
Attending: PHYSICAL MEDICINE & REHABILITATION
Payer: COMMERCIAL

## 2022-03-17 DIAGNOSIS — M51.16 LUMBAR DISC HERNIATION WITH RADICULOPATHY: ICD-10-CM

## 2022-03-17 DIAGNOSIS — M54.59 LUMBAR TRIGGER POINT SYNDROME: ICD-10-CM

## 2022-03-17 DIAGNOSIS — M54.59 MECHANICAL LOW BACK PAIN: ICD-10-CM

## 2022-03-17 DIAGNOSIS — M47.816 LUMBAR SPONDYLOSIS: ICD-10-CM

## 2022-03-17 DIAGNOSIS — M51.26 BULGE OF LUMBAR DISC WITHOUT MYELOPATHY: ICD-10-CM

## 2022-03-17 DIAGNOSIS — M47.816 FACET SYNDROME, LUMBAR: ICD-10-CM

## 2022-03-17 DIAGNOSIS — M48.061 LUMBAR FORAMINAL STENOSIS: ICD-10-CM

## 2022-03-17 DIAGNOSIS — M79.10 MYALGIA: ICD-10-CM

## 2022-03-17 DIAGNOSIS — M51.37 DDD (DEGENERATIVE DISC DISEASE), LUMBOSACRAL: ICD-10-CM

## 2022-03-17 PROCEDURE — 97140 MANUAL THERAPY 1/> REGIONS: CPT | Performed by: PHYSICAL THERAPIST

## 2022-03-17 PROCEDURE — 97110 THERAPEUTIC EXERCISES: CPT | Performed by: PHYSICAL THERAPIST

## 2022-03-17 PROCEDURE — 97014 ELECTRIC STIMULATION THERAPY: CPT | Performed by: PHYSICAL THERAPIST

## 2022-03-18 ENCOUNTER — OFFICE VISIT (OUTPATIENT)
Dept: ENDOCRINOLOGY CLINIC | Facility: CLINIC | Age: 57
End: 2022-03-18
Payer: COMMERCIAL

## 2022-03-18 VITALS
WEIGHT: 198 LBS | DIASTOLIC BLOOD PRESSURE: 71 MMHG | HEART RATE: 74 BPM | BODY MASS INDEX: 28 KG/M2 | SYSTOLIC BLOOD PRESSURE: 112 MMHG

## 2022-03-18 DIAGNOSIS — E11.69 TYPE 2 DIABETES MELLITUS WITH OTHER SPECIFIED COMPLICATION, WITHOUT LONG-TERM CURRENT USE OF INSULIN (HCC): Primary | ICD-10-CM

## 2022-03-18 DIAGNOSIS — E78.5 DYSLIPIDEMIA: ICD-10-CM

## 2022-03-18 LAB
CARTRIDGE LOT#: ABNORMAL NUMERIC
GLUCOSE BLOOD: 132
HEMOGLOBIN A1C: 7.6 % (ref 4.3–5.6)
TEST STRIP LOT #: NORMAL NUMERIC

## 2022-03-18 PROCEDURE — 36416 COLLJ CAPILLARY BLOOD SPEC: CPT | Performed by: INTERNAL MEDICINE

## 2022-03-18 PROCEDURE — 83036 HEMOGLOBIN GLYCOSYLATED A1C: CPT | Performed by: INTERNAL MEDICINE

## 2022-03-18 PROCEDURE — 3074F SYST BP LT 130 MM HG: CPT | Performed by: INTERNAL MEDICINE

## 2022-03-18 PROCEDURE — 99213 OFFICE O/P EST LOW 20 MIN: CPT | Performed by: INTERNAL MEDICINE

## 2022-03-18 PROCEDURE — 82947 ASSAY GLUCOSE BLOOD QUANT: CPT | Performed by: INTERNAL MEDICINE

## 2022-03-18 PROCEDURE — 3078F DIAST BP <80 MM HG: CPT | Performed by: INTERNAL MEDICINE

## 2022-03-18 PROCEDURE — 3051F HG A1C>EQUAL 7.0%<8.0%: CPT | Performed by: FAMILY MEDICINE

## 2022-03-24 ENCOUNTER — TELEPHONE (OUTPATIENT)
Dept: FAMILY MEDICINE CLINIC | Facility: CLINIC | Age: 57
End: 2022-03-24

## 2022-03-25 ENCOUNTER — TELEMEDICINE (OUTPATIENT)
Dept: PHYSICAL MEDICINE AND REHAB | Facility: CLINIC | Age: 57
End: 2022-03-25

## 2022-03-25 DIAGNOSIS — M51.37 DDD (DEGENERATIVE DISC DISEASE), LUMBOSACRAL: ICD-10-CM

## 2022-03-25 DIAGNOSIS — M54.59 MECHANICAL LOW BACK PAIN: ICD-10-CM

## 2022-03-25 DIAGNOSIS — M48.061 LUMBAR FORAMINAL STENOSIS: ICD-10-CM

## 2022-03-25 DIAGNOSIS — M47.816 FACET SYNDROME, LUMBAR: ICD-10-CM

## 2022-03-25 DIAGNOSIS — M47.816 LUMBAR SPONDYLOSIS: ICD-10-CM

## 2022-03-25 DIAGNOSIS — M51.26 BULGE OF LUMBAR DISC WITHOUT MYELOPATHY: Primary | ICD-10-CM

## 2022-03-25 DIAGNOSIS — M54.59 LUMBAR TRIGGER POINT SYNDROME: ICD-10-CM

## 2022-03-25 DIAGNOSIS — M79.10 MYALGIA: ICD-10-CM

## 2022-03-25 DIAGNOSIS — M51.16 LUMBAR DISC HERNIATION WITH RADICULOPATHY: ICD-10-CM

## 2022-03-25 PROCEDURE — 99214 OFFICE O/P EST MOD 30 MIN: CPT | Performed by: PHYSICAL MEDICINE & REHABILITATION

## 2022-03-25 NOTE — PATIENT INSTRUCTIONS
1) Continue with chiropractics  2) Make an appointment with Dr. Baylee Cuba (neurosurgery) [phone]  3) Continue Tramadol 50 mg every 6 hours as needed for pain  4) Follow up with me as needed

## 2022-03-29 ENCOUNTER — APPOINTMENT (OUTPATIENT)
Dept: PHYSICAL THERAPY | Age: 57
End: 2022-03-29
Attending: PHYSICAL MEDICINE & REHABILITATION
Payer: COMMERCIAL

## 2022-03-31 ENCOUNTER — APPOINTMENT (OUTPATIENT)
Dept: PHYSICAL THERAPY | Age: 57
End: 2022-03-31
Attending: PHYSICAL MEDICINE & REHABILITATION
Payer: COMMERCIAL

## 2022-04-06 NOTE — TELEPHONE ENCOUNTER
LOV: 3/18/22    RTC: 3-4 Months    FU: No FU Appt Scheduled    They are requesting a 1 Year Supply    1 Year Supply Pending Below

## 2022-05-06 ENCOUNTER — HOSPITAL ENCOUNTER (OUTPATIENT)
Dept: GENERAL RADIOLOGY | Age: 57
Discharge: HOME | End: 2022-05-06
Attending: FAMILY MEDICINE
Payer: COMMERCIAL

## 2022-05-06 ENCOUNTER — OFFICE VISIT (OUTPATIENT)
Dept: FAMILY MEDICINE CLINIC | Facility: CLINIC | Age: 57
End: 2022-05-06
Payer: COMMERCIAL

## 2022-05-06 VITALS
WEIGHT: 197 LBS | SYSTOLIC BLOOD PRESSURE: 120 MMHG | HEIGHT: 70 IN | HEART RATE: 67 BPM | BODY MASS INDEX: 28.2 KG/M2 | DIASTOLIC BLOOD PRESSURE: 70 MMHG

## 2022-05-06 DIAGNOSIS — R07.81 RIB PAIN ON LEFT SIDE: Primary | ICD-10-CM

## 2022-05-06 DIAGNOSIS — M54.10 RADICULOPATHY, UNSPECIFIED SPINAL REGION: ICD-10-CM

## 2022-05-06 DIAGNOSIS — R07.81 RIB PAIN ON LEFT SIDE: ICD-10-CM

## 2022-05-06 PROCEDURE — 71046 X-RAY EXAM CHEST 2 VIEWS: CPT | Performed by: FAMILY MEDICINE

## 2022-05-06 PROCEDURE — 99213 OFFICE O/P EST LOW 20 MIN: CPT | Performed by: FAMILY MEDICINE

## 2022-05-06 PROCEDURE — 3078F DIAST BP <80 MM HG: CPT | Performed by: FAMILY MEDICINE

## 2022-05-06 PROCEDURE — 71100 X-RAY EXAM RIBS UNI 2 VIEWS: CPT | Performed by: FAMILY MEDICINE

## 2022-05-06 PROCEDURE — 3008F BODY MASS INDEX DOCD: CPT | Performed by: FAMILY MEDICINE

## 2022-05-06 PROCEDURE — 3074F SYST BP LT 130 MM HG: CPT | Performed by: FAMILY MEDICINE

## 2022-05-06 RX ORDER — HYDROCODONE BITARTRATE AND ACETAMINOPHEN 10; 325 MG/1; MG/1
1 TABLET ORAL EVERY 6 HOURS PRN
Qty: 30 TABLET | Refills: 0 | Status: SHIPPED | OUTPATIENT
Start: 2022-05-06

## 2022-05-06 NOTE — PROGRESS NOTES
Blood pressure 120/70, pulse 67, height 5' 10\" (1.778 m), weight 197 lb (89.4 kg). Patient presents today complaining of left-sided pain after he fell on a counter in his house. Fall occurred 2 weeks ago. Pain is persistent. Ibuprofen with some relief that is temporary. Some pain with respiration. Also requesting referral to neurosurgery for lumbar radiculopathy and questions about shingles vaccine.     Objective lungs clear to auscultation no rales rhonchi or wheezes    Assessment #1 left-sided pain #2 lumbar radiculopathy     Plan #1 stat x-rays ordered for today #2 referral to neurosurgery    Malaysian information given regarding shingles vaccine

## 2022-05-31 RX ORDER — DULAGLUTIDE 3 MG/.5ML
3 INJECTION, SOLUTION SUBCUTANEOUS WEEKLY
Qty: 6 ML | Refills: 0 | Status: SHIPPED | OUTPATIENT
Start: 2022-05-31

## 2022-05-31 NOTE — TELEPHONE ENCOUNTER
LOV 3/18/2022 --> F/u 3-4 mo  A1c of 7.6%  No f/u scheduled    MTF 2185 mg BID  Trulicity 3 mg q week  Jardiance 25 mg daily      Mychart message sent to pt to schedule follow up    Trulicity 3mg weekly 90 day pended and routed to provider to review

## 2022-06-02 ENCOUNTER — TELEPHONE (OUTPATIENT)
Dept: FAMILY MEDICINE CLINIC | Facility: CLINIC | Age: 57
End: 2022-06-02

## 2022-06-02 DIAGNOSIS — M47.816 LUMBAR SPONDYLOSIS: ICD-10-CM

## 2022-06-02 DIAGNOSIS — M48.061 SPINAL STENOSIS OF LUMBAR REGION, UNSPECIFIED WHETHER NEUROGENIC CLAUDICATION PRESENT: ICD-10-CM

## 2022-06-02 DIAGNOSIS — M51.26 BULGE OF LUMBAR DISC WITHOUT MYELOPATHY: ICD-10-CM

## 2022-06-02 DIAGNOSIS — M54.59 MECHANICAL LOW BACK PAIN: ICD-10-CM

## 2022-06-02 DIAGNOSIS — M51.16 LUMBAR DISC HERNIATION WITH RADICULOPATHY: ICD-10-CM

## 2022-06-02 DIAGNOSIS — M51.37 DEGENERATION OF LUMBAR OR LUMBOSACRAL INTERVERTEBRAL DISC: Primary | ICD-10-CM

## 2022-06-02 NOTE — TELEPHONE ENCOUNTER
Patient requesting a referral to Dr. Pankaj Ferguson for a follow up appt tomorrow. I explained to patient that I would submit the request and he started to argue and scream, I asked patient to please allow me to explain that the referral needed to be authorized and kept on talking over me not allowing me to speak, stated he is only 5 min away from the office and that he would walk in and request one himself.

## 2022-06-02 NOTE — TELEPHONE ENCOUNTER
Thanks. I am ok with him seeing neurosurgery. He has already been evaluated by them and will likely need surgery.     Jamilah Castle

## 2022-06-02 NOTE — TELEPHONE ENCOUNTER
referral pended, referral originally given by Dr Stacie Barrios    Please sign if agreeable, DX added from previous referral

## 2022-06-12 DIAGNOSIS — E11.69 TYPE 2 DIABETES MELLITUS WITH OTHER SPECIFIED COMPLICATION, WITHOUT LONG-TERM CURRENT USE OF INSULIN (HCC): ICD-10-CM

## 2022-06-12 RX ORDER — EMPAGLIFLOZIN 25 MG/1
1 TABLET, FILM COATED ORAL DAILY
Qty: 90 TABLET | Refills: 0 | Status: SHIPPED | OUTPATIENT
Start: 2022-06-12

## 2022-07-05 ENCOUNTER — OFFICE VISIT (OUTPATIENT)
Dept: FAMILY MEDICINE CLINIC | Facility: CLINIC | Age: 57
End: 2022-07-05
Payer: COMMERCIAL

## 2022-07-05 VITALS
WEIGHT: 192 LBS | DIASTOLIC BLOOD PRESSURE: 69 MMHG | HEIGHT: 70 IN | SYSTOLIC BLOOD PRESSURE: 115 MMHG | HEART RATE: 74 BPM | BODY MASS INDEX: 27.49 KG/M2

## 2022-07-05 DIAGNOSIS — M48.061 SPINAL STENOSIS OF LUMBAR REGION, UNSPECIFIED WHETHER NEUROGENIC CLAUDICATION PRESENT: Primary | ICD-10-CM

## 2022-07-05 DIAGNOSIS — E11.9 TYPE 2 DIABETES MELLITUS WITHOUT COMPLICATION, WITHOUT LONG-TERM CURRENT USE OF INSULIN (HCC): ICD-10-CM

## 2022-07-05 PROCEDURE — 3074F SYST BP LT 130 MM HG: CPT | Performed by: FAMILY MEDICINE

## 2022-07-05 PROCEDURE — 3008F BODY MASS INDEX DOCD: CPT | Performed by: FAMILY MEDICINE

## 2022-07-05 PROCEDURE — 99213 OFFICE O/P EST LOW 20 MIN: CPT | Performed by: FAMILY MEDICINE

## 2022-07-05 PROCEDURE — 3078F DIAST BP <80 MM HG: CPT | Performed by: FAMILY MEDICINE

## 2022-07-05 RX ORDER — LISINOPRIL 2.5 MG/1
2.5 TABLET ORAL DAILY
Qty: 90 TABLET | Refills: 3 | Status: SHIPPED | OUTPATIENT
Start: 2022-07-05

## 2022-07-05 RX ORDER — DULAGLUTIDE 3 MG/.5ML
3 INJECTION, SOLUTION SUBCUTANEOUS WEEKLY
Qty: 6 ML | Refills: 0 | Status: CANCELLED | OUTPATIENT
Start: 2022-07-05

## 2022-07-05 RX ORDER — GABAPENTIN 300 MG/1
300 CAPSULE ORAL 3 TIMES DAILY
COMMUNITY
Start: 2022-06-03

## 2022-07-05 RX ORDER — OMEPRAZOLE 40 MG/1
40 CAPSULE, DELAYED RELEASE ORAL DAILY
Qty: 90 CAPSULE | Refills: 3 | Status: SHIPPED | OUTPATIENT
Start: 2022-07-05 | End: 2023-06-30

## 2022-07-05 NOTE — PROGRESS NOTES
Blood pressure 115/69, pulse 74, height 5' 10\" (1.778 m), weight 192 lb (87.1 kg). Presents today following up for history of lumbar spine stenosis and type 2 diabetes. Was told by neurosurgery he will need operation. He has had at least 5 epidural steroid injections without relief. He currently feels well.     Objective patient is comfortable no apparent distress    Assessment #1 lumbar spinal stenosis #2 type 2 diabetes    Plan #1 entered preoperative testing orders for all requested tests #2 glycohemoglobin and microalbumin urine orders entered    Refill lisinopril and omeprazole    Follow-up for preoperative physical

## 2022-07-12 ENCOUNTER — TELEPHONE (OUTPATIENT)
Dept: FAMILY MEDICINE CLINIC | Facility: CLINIC | Age: 57
End: 2022-07-12

## 2022-07-12 ENCOUNTER — OFFICE VISIT (OUTPATIENT)
Dept: ENDOCRINOLOGY CLINIC | Facility: CLINIC | Age: 57
End: 2022-07-12
Payer: COMMERCIAL

## 2022-07-12 VITALS
DIASTOLIC BLOOD PRESSURE: 79 MMHG | SYSTOLIC BLOOD PRESSURE: 119 MMHG | BODY MASS INDEX: 28 KG/M2 | WEIGHT: 195 LBS | HEART RATE: 68 BPM

## 2022-07-12 DIAGNOSIS — E78.5 DYSLIPIDEMIA: ICD-10-CM

## 2022-07-12 DIAGNOSIS — E11.69 TYPE 2 DIABETES MELLITUS WITH OTHER SPECIFIED COMPLICATION, WITHOUT LONG-TERM CURRENT USE OF INSULIN (HCC): Primary | ICD-10-CM

## 2022-07-12 LAB
CARTRIDGE LOT#: NORMAL NUMERIC
GLUCOSE BLOOD: 79
TEST STRIP LOT #: NORMAL NUMERIC

## 2022-07-12 PROCEDURE — 82947 ASSAY GLUCOSE BLOOD QUANT: CPT | Performed by: INTERNAL MEDICINE

## 2022-07-12 PROCEDURE — 83036 HEMOGLOBIN GLYCOSYLATED A1C: CPT | Performed by: INTERNAL MEDICINE

## 2022-07-12 PROCEDURE — 99213 OFFICE O/P EST LOW 20 MIN: CPT | Performed by: INTERNAL MEDICINE

## 2022-07-12 PROCEDURE — 3044F HG A1C LEVEL LT 7.0%: CPT | Performed by: INTERNAL MEDICINE

## 2022-07-12 PROCEDURE — 3074F SYST BP LT 130 MM HG: CPT | Performed by: INTERNAL MEDICINE

## 2022-07-12 PROCEDURE — 3078F DIAST BP <80 MM HG: CPT | Performed by: INTERNAL MEDICINE

## 2022-07-12 RX ORDER — DULAGLUTIDE 1.5 MG/.5ML
1.5 INJECTION, SOLUTION SUBCUTANEOUS WEEKLY
Qty: 6 ML | Refills: 0 | Status: SHIPPED | OUTPATIENT
Start: 2022-07-12

## 2022-07-13 ENCOUNTER — HOSPITAL ENCOUNTER (OUTPATIENT)
Dept: GENERAL RADIOLOGY | Age: 57
Discharge: HOME | End: 2022-07-13
Attending: FAMILY MEDICINE
Payer: COMMERCIAL

## 2022-07-13 ENCOUNTER — PATIENT MESSAGE (OUTPATIENT)
Dept: ENDOCRINOLOGY CLINIC | Facility: CLINIC | Age: 57
End: 2022-07-13

## 2022-07-13 DIAGNOSIS — M48.061 SPINAL STENOSIS OF LUMBAR REGION, UNSPECIFIED WHETHER NEUROGENIC CLAUDICATION PRESENT: ICD-10-CM

## 2022-07-13 PROCEDURE — 71046 X-RAY EXAM CHEST 2 VIEWS: CPT | Performed by: FAMILY MEDICINE

## 2022-07-13 RX ORDER — BLOOD SUGAR DIAGNOSTIC
STRIP MISCELLANEOUS
Qty: 200 STRIP | Refills: 1 | Status: SHIPPED | OUTPATIENT
Start: 2022-07-13

## 2022-07-13 RX ORDER — LANCETS
EACH MISCELLANEOUS
Qty: 200 EACH | Refills: 1 | Status: SHIPPED | OUTPATIENT
Start: 2022-07-13

## 2022-07-13 RX ORDER — BLOOD-GLUCOSE METER
1 EACH MISCELLANEOUS DAILY
Qty: 1 KIT | Refills: 0 | Status: SHIPPED | OUTPATIENT
Start: 2022-07-13

## 2022-07-13 NOTE — TELEPHONE ENCOUNTER
From: Niru Class  To:  Zuleyka Paredes MD  Sent: 7/13/2022 6:20 AM CDT  Subject: Diabetes     Puede ordenarme un monitor para mi diabetes el que tengo no sirve creo muchas donato

## 2022-07-13 NOTE — TELEPHONE ENCOUNTER
The patient is asking for a new glucometer as the one he has is not working. With his insurance, contour is most likely the covered brand. Pended script for provider to review and approve.

## 2022-07-14 ENCOUNTER — EKG ENCOUNTER (OUTPATIENT)
Dept: LAB | Age: 57
End: 2022-07-14
Attending: FAMILY MEDICINE
Payer: COMMERCIAL

## 2022-07-14 ENCOUNTER — LAB ENCOUNTER (OUTPATIENT)
Dept: LAB | Age: 57
End: 2022-07-14
Attending: FAMILY MEDICINE
Payer: COMMERCIAL

## 2022-07-14 DIAGNOSIS — E11.9 TYPE 2 DIABETES MELLITUS WITHOUT COMPLICATION, WITHOUT LONG-TERM CURRENT USE OF INSULIN (HCC): ICD-10-CM

## 2022-07-14 DIAGNOSIS — M48.061 SPINAL STENOSIS OF LUMBAR REGION, UNSPECIFIED WHETHER NEUROGENIC CLAUDICATION PRESENT: ICD-10-CM

## 2022-07-14 LAB
ALBUMIN SERPL-MCNC: 3.9 G/DL (ref 3.4–5)
ALBUMIN/GLOB SERPL: 1.1 {RATIO} (ref 1–2)
ALP LIVER SERPL-CCNC: 61 U/L
ALT SERPL-CCNC: 22 U/L
ANION GAP SERPL CALC-SCNC: 10 MMOL/L (ref 0–18)
APTT PPP: 27.3 SECONDS (ref 23.3–35.6)
AST SERPL-CCNC: 15 U/L (ref 15–37)
BASOPHILS # BLD AUTO: 0.03 X10(3) UL (ref 0–0.2)
BASOPHILS NFR BLD AUTO: 0.5 %
BILIRUB SERPL-MCNC: 0.7 MG/DL (ref 0.1–2)
BILIRUB UR QL: NEGATIVE
BUN BLD-MCNC: 16 MG/DL (ref 7–18)
BUN/CREAT SERPL: 19 (ref 10–20)
CALCIUM BLD-MCNC: 9.5 MG/DL (ref 8.5–10.1)
CHLORIDE SERPL-SCNC: 106 MMOL/L (ref 98–112)
CHOLEST SERPL-MCNC: 177 MG/DL (ref ?–200)
CLARITY UR: CLEAR
CO2 SERPL-SCNC: 23 MMOL/L (ref 21–32)
COLOR UR: YELLOW
COMPLEXED PSA SERPL-MCNC: 1.34 NG/ML (ref ?–4)
CREAT BLD-MCNC: 0.84 MG/DL
CREAT UR-SCNC: 134 MG/DL
DEPRECATED RDW RBC AUTO: 38.5 FL (ref 35.1–46.3)
EOSINOPHIL # BLD AUTO: 0.34 X10(3) UL (ref 0–0.7)
EOSINOPHIL NFR BLD AUTO: 5.2 %
ERYTHROCYTE [DISTWIDTH] IN BLOOD BY AUTOMATED COUNT: 11.4 % (ref 11–15)
EST. AVERAGE GLUCOSE BLD GHB EST-MCNC: 151 MG/DL (ref 68–126)
FASTING PATIENT LIPID ANSWER: YES
FASTING STATUS PATIENT QL REPORTED: YES
GLOBULIN PLAS-MCNC: 3.5 G/DL (ref 2.8–4.4)
GLUCOSE BLD-MCNC: 114 MG/DL (ref 70–99)
GLUCOSE UR-MCNC: >=1000 MG/DL
HBA1C MFR BLD: 6.9 % (ref ?–5.7)
HCT VFR BLD AUTO: 48.2 %
HDLC SERPL-MCNC: 36 MG/DL (ref 40–59)
HGB BLD-MCNC: 17.1 G/DL
HGB UR QL STRIP.AUTO: NEGATIVE
IMM GRANULOCYTES # BLD AUTO: 0.02 X10(3) UL (ref 0–1)
IMM GRANULOCYTES NFR BLD: 0.3 %
INR BLD: 0.88 (ref 0.8–1.2)
KETONES UR-MCNC: NEGATIVE MG/DL
LDLC SERPL CALC-MCNC: 64 MG/DL (ref ?–100)
LEUKOCYTE ESTERASE UR QL STRIP.AUTO: NEGATIVE
LYMPHOCYTES # BLD AUTO: 2.34 X10(3) UL (ref 1–4)
LYMPHOCYTES NFR BLD AUTO: 36.1 %
MCH RBC QN AUTO: 32.8 PG (ref 26–34)
MCHC RBC AUTO-ENTMCNC: 35.5 G/DL (ref 31–37)
MCV RBC AUTO: 92.3 FL
MICROALBUMIN UR-MCNC: 1.04 MG/DL
MICROALBUMIN/CREAT 24H UR-RTO: 7.8 UG/MG (ref ?–30)
MONOCYTES # BLD AUTO: 0.58 X10(3) UL (ref 0.1–1)
MONOCYTES NFR BLD AUTO: 9 %
MRSA NASAL: NEGATIVE
NEUTROPHILS # BLD AUTO: 3.17 X10 (3) UL (ref 1.5–7.7)
NEUTROPHILS # BLD AUTO: 3.17 X10(3) UL (ref 1.5–7.7)
NEUTROPHILS NFR BLD AUTO: 48.9 %
NITRITE UR QL STRIP.AUTO: NEGATIVE
NONHDLC SERPL-MCNC: 141 MG/DL (ref ?–130)
OSMOLALITY SERPL CALC.SUM OF ELEC: 290 MOSM/KG (ref 275–295)
PH UR: 5.5 [PH] (ref 5–8)
PLATELET # BLD AUTO: 283 10(3)UL (ref 150–450)
POTASSIUM SERPL-SCNC: 4.1 MMOL/L (ref 3.5–5.1)
PROT SERPL-MCNC: 7.4 G/DL (ref 6.4–8.2)
PROT UR-MCNC: NEGATIVE MG/DL
PROTHROMBIN TIME: 12.1 SECONDS (ref 11.6–14.8)
RBC # BLD AUTO: 5.22 X10(6)UL
SODIUM SERPL-SCNC: 139 MMOL/L (ref 136–145)
SP GR UR STRIP: 1.02 (ref 1–1.03)
STAPH A BY PCR: NEGATIVE
TRIGL SERPL-MCNC: 502 MG/DL (ref 30–149)
UROBILINOGEN UR STRIP-ACNC: 0.2
VLDLC SERPL CALC-MCNC: 76 MG/DL (ref 0–30)
WBC # BLD AUTO: 6.5 X10(3) UL (ref 4–11)

## 2022-07-14 PROCEDURE — 87640 STAPH A DNA AMP PROBE: CPT

## 2022-07-14 PROCEDURE — 3044F HG A1C LEVEL LT 7.0%: CPT | Performed by: FAMILY MEDICINE

## 2022-07-14 PROCEDURE — 82043 UR ALBUMIN QUANTITATIVE: CPT

## 2022-07-14 PROCEDURE — 80053 COMPREHEN METABOLIC PANEL: CPT

## 2022-07-14 PROCEDURE — 82570 ASSAY OF URINE CREATININE: CPT

## 2022-07-14 PROCEDURE — 85730 THROMBOPLASTIN TIME PARTIAL: CPT

## 2022-07-14 PROCEDURE — 3061F NEG MICROALBUMINURIA REV: CPT | Performed by: FAMILY MEDICINE

## 2022-07-14 PROCEDURE — 81003 URINALYSIS AUTO W/O SCOPE: CPT

## 2022-07-14 PROCEDURE — 93010 ELECTROCARDIOGRAM REPORT: CPT | Performed by: FAMILY MEDICINE

## 2022-07-14 PROCEDURE — 85610 PROTHROMBIN TIME: CPT

## 2022-07-14 PROCEDURE — 93005 ELECTROCARDIOGRAM TRACING: CPT

## 2022-07-14 PROCEDURE — 80061 LIPID PANEL: CPT

## 2022-07-14 PROCEDURE — 85025 COMPLETE CBC W/AUTO DIFF WBC: CPT

## 2022-07-14 PROCEDURE — 36415 COLL VENOUS BLD VENIPUNCTURE: CPT

## 2022-07-14 PROCEDURE — 87641 MR-STAPH DNA AMP PROBE: CPT

## 2022-07-14 PROCEDURE — 83036 HEMOGLOBIN GLYCOSYLATED A1C: CPT

## 2022-07-15 ENCOUNTER — TELEPHONE (OUTPATIENT)
Dept: FAMILY MEDICINE CLINIC | Facility: CLINIC | Age: 57
End: 2022-07-15

## 2022-07-15 NOTE — TELEPHONE ENCOUNTER
----- Message From: Fior Yuen DO  Sent: 7/14/2022 5:53 PM CDT---    Triglycerides very high we will discontinue atorvastatin and start rosuvastatin.

## 2022-07-28 ENCOUNTER — TELEPHONE (OUTPATIENT)
Dept: FAMILY MEDICINE CLINIC | Facility: CLINIC | Age: 57
End: 2022-07-28

## 2022-07-28 NOTE — TELEPHONE ENCOUNTER
Awaiting preoperative cardiology clearance. Please inquire if patient had cardiology consultation visit.

## 2022-07-28 NOTE — TELEPHONE ENCOUNTER
Patient states he was seen by cardiology and has stress test 7/29/22    Phoned Cardio @ [phone], unable to process call due to system error.      Cardiology dept please advise an or fax consult note to [phone]

## 2022-07-28 NOTE — TELEPHONE ENCOUNTER
April- Dr. Colten Stanley office calling to check on status of surgical clearance form.    Fax# [phone]

## 2022-07-31 ENCOUNTER — LAB ENCOUNTER (OUTPATIENT)
Dept: LAB | Facility: HOSPITAL | Age: 57
End: 2022-07-31
Attending: NEUROLOGICAL SURGERY
Payer: COMMERCIAL

## 2022-07-31 DIAGNOSIS — Z01.818 PREOP TESTING: ICD-10-CM

## 2022-08-01 ENCOUNTER — TELEPHONE (OUTPATIENT)
Dept: FAMILY MEDICINE CLINIC | Facility: CLINIC | Age: 57
End: 2022-08-01

## 2022-08-01 LAB — SARS-COV-2 RNA RESP QL NAA+PROBE: NOT DETECTED

## 2022-08-01 NOTE — TELEPHONE ENCOUNTER
Eugenie from Pre admission from [facility] states that Patient has surgery on Wedneday 8/3/22 and need cardiac clearance.

## 2022-08-02 NOTE — TELEPHONE ENCOUNTER
Carmela Early 20 hours ago (11:50 AM)     SONNY Traore from Pre admission from THE [facility] states that Patient has surgery on Wedneday 8/3/22 and need cardiac clearance.

## 2022-08-02 NOTE — TELEPHONE ENCOUNTER
Attempted to phone Cardio Dept, still getting a \"system error\" for backline.  @ [phone]    Phoned patient line @ [phone], Spoke with [facility] staff advised of needed clearance     Clearance to be faxed to [phone], OV notes and cardiac testing

## 2022-08-02 NOTE — TELEPHONE ENCOUNTER
Porfirio Chun from [facility] office called to let Alma Briseno know patient is NOT cleared for surgery. He had an abnormal stress test.  Dr. Britni Gutierrez , neurosurgeon has been notified.

## 2022-08-09 ENCOUNTER — LAB ENCOUNTER (OUTPATIENT)
Dept: LAB | Age: 57
End: 2022-08-09
Attending: INTERNAL MEDICINE
Payer: COMMERCIAL

## 2022-08-09 DIAGNOSIS — Z01.818 PRE-OP TESTING: ICD-10-CM

## 2022-08-10 LAB — SARS-COV-2 RNA RESP QL NAA+PROBE: NOT DETECTED

## 2022-08-12 ENCOUNTER — HOSPITAL ENCOUNTER (OUTPATIENT)
Dept: INTERVENTIONAL RADIOLOGY/VASCULAR | Facility: HOSPITAL | Age: 57
Discharge: HOME | End: 2022-08-12
Attending: INTERNAL MEDICINE | Admitting: INTERNAL MEDICINE
Payer: COMMERCIAL

## 2022-08-12 ENCOUNTER — TELEPHONE (OUTPATIENT)
Dept: FAMILY MEDICINE CLINIC | Facility: CLINIC | Age: 57
End: 2022-08-12

## 2022-08-12 VITALS
RESPIRATION RATE: 23 BRPM | HEART RATE: 68 BPM | DIASTOLIC BLOOD PRESSURE: 84 MMHG | WEIGHT: 195 LBS | BODY MASS INDEX: 28 KG/M2 | SYSTOLIC BLOOD PRESSURE: 110 MMHG | TEMPERATURE: 97 F | OXYGEN SATURATION: 99 %

## 2022-08-12 DIAGNOSIS — Z01.818 PRE-OP TESTING: Primary | ICD-10-CM

## 2022-08-12 DIAGNOSIS — I25.10 CAD (CORONARY ARTERY DISEASE): ICD-10-CM

## 2022-08-12 DIAGNOSIS — R94.39 ABNORMAL STRESS TEST: ICD-10-CM

## 2022-08-12 LAB — GLUCOSE BLDC GLUCOMTR-MCNC: 141 MG/DL (ref 70–99)

## 2022-08-12 PROCEDURE — 4A023N7 MEASUREMENT OF CARDIAC SAMPLING AND PRESSURE, LEFT HEART, PERCUTANEOUS APPROACH: ICD-10-PCS | Performed by: INTERNAL MEDICINE

## 2022-08-12 PROCEDURE — 82962 GLUCOSE BLOOD TEST: CPT

## 2022-08-12 PROCEDURE — 99152 MOD SED SAME PHYS/QHP 5/>YRS: CPT

## 2022-08-12 PROCEDURE — 93458 L HRT ARTERY/VENTRICLE ANGIO: CPT

## 2022-08-12 PROCEDURE — 36415 COLL VENOUS BLD VENIPUNCTURE: CPT

## 2022-08-12 PROCEDURE — B2151ZZ FLUOROSCOPY OF LEFT HEART USING LOW OSMOLAR CONTRAST: ICD-10-PCS | Performed by: INTERNAL MEDICINE

## 2022-08-12 PROCEDURE — B2111ZZ FLUOROSCOPY OF MULTIPLE CORONARY ARTERIES USING LOW OSMOLAR CONTRAST: ICD-10-PCS | Performed by: INTERNAL MEDICINE

## 2022-08-12 RX ORDER — HEPARIN SODIUM 1000 [USP'U]/ML
INJECTION, SOLUTION INTRAVENOUS; SUBCUTANEOUS
Status: DISCONTINUED
Start: 2022-08-12 | End: 2022-08-12 | Stop reason: WASHOUT

## 2022-08-12 RX ORDER — ASPIRIN 81 MG/1
324 TABLET, CHEWABLE ORAL ONCE
COMMUNITY

## 2022-08-12 RX ORDER — LIDOCAINE HYDROCHLORIDE 20 MG/ML
INJECTION, SOLUTION EPIDURAL; INFILTRATION; INTRACAUDAL; PERINEURAL
Status: COMPLETED
Start: 2022-08-12 | End: 2022-08-12

## 2022-08-12 RX ORDER — NITROGLYCERIN 20 MG/100ML
INJECTION INTRAVENOUS
Status: COMPLETED
Start: 2022-08-12 | End: 2022-08-12

## 2022-08-12 RX ORDER — MIDAZOLAM HYDROCHLORIDE 1 MG/ML
INJECTION INTRAMUSCULAR; INTRAVENOUS
Status: COMPLETED
Start: 2022-08-12 | End: 2022-08-12

## 2022-08-12 RX ORDER — VERAPAMIL HYDROCHLORIDE 2.5 MG/ML
INJECTION, SOLUTION INTRAVENOUS
Status: COMPLETED
Start: 2022-08-12 | End: 2022-08-12

## 2022-08-12 RX ORDER — SODIUM CHLORIDE 9 MG/ML
INJECTION, SOLUTION INTRAVENOUS
Status: DISCONTINUED | OUTPATIENT
Start: 2022-08-12 | End: 2022-08-12

## 2022-08-12 NOTE — IVS NOTE
patrient awake and alert x 4    VSS, RATLIFF, no pain from right groin or chest, stillwith chronic back pain    Right groin soft, dry, no hematoma    Right groin dressing dry, inrtact, no drainage    Instructions given to patient and family, they stated understanding    Patient able to ambulate around nusring station, drink and urinate before discharge    PIV removed    Note for work given    Patient down to to main entrance with out problems

## 2022-08-12 NOTE — TELEPHONE ENCOUNTER
Pt states that he has completed all the test that were needed for his pre op clearance. Pt would like to confirm that the results will be sent to . Pt would like a call back in Japanese.

## 2022-08-12 NOTE — INTERVAL H&P NOTE
Pre-op Diagnosis: * No pre-op diagnosis entered *    The above referenced H&P was reviewed by Sammy Rivera MD on 8/12/2022, the patient was examined and no significant changes have occurred in the patient's condition since the H&P was performed. I discussed with the patient and/or legal representative the potential benefits, risks and side effects of this procedure; the likelihood of the patient achieving goals; and potential problems that might occur during recuperation. I discussed reasonable alternatives to the procedure, including risks, benefits and side effects related to the alternatives and risks related to not receiving this procedure. We will proceed with procedure as planned.

## 2022-08-12 NOTE — PROCEDURES
[facility]    Cardiac Cath Procedure Note    Belen Montaño Patient Status:  Outpatient in a Bed    1965 MRN [id number]   Location [facility] Attending Justine Pratt, [address] [facility] [address] Day # 0 PCP Bertha Zapata DO       Cardiologist: Vicky Ramsay MD  Primary Proceduralist: Vicky Ramsay MD  Procedure Performed: LH and LV  Date of Procedure: 2022   Indication: positive stress test    Summary of procedure:    Non-obstructive coronary disease - mild PL and mid LAD disease  Unable to use radial artery due to small caliber  Normal LV EDP      Left Ventriculography and hemodynamics:   LV EF not done  LV EDP 8 mmHg, 1 L IVF bolus given  No gradient across aortic valve      Coronary Angiography  RCA:  Dominant and free of obstructive disease, supplies PDA and PL. Mid PL 40% stenosis. Left main:  Free of obstructive disease    Left anterior descending:  Mild LAD stenosis, mid 40%. Free of obstructive disease, supplies multiple diagonals which are non-obstructive    Circumflex:  Free of obstructive disease, supplies multiple OM branches which are patent      Assessment:  CAD - non-obstructive  False positive stress test  Normal LV EDP      Recommendations:  Continue risk factor modification    DC HOME in two hours      Description of Procedure:   After written informed consent was obtained from the patient, patient was brought to the cardiac catheterization laboratory. Patient was prepped and draped in the usual sterile fashion. Lidocaine 1% was used to infiltrate the right groin for local anesthesia and a 6 Jamaican introducer sheath was inserted into the right femoral artery via ultrasound guidance and micropuncture kit. Selective coronary angiography performed with JR4 catheter for RCA and JL3.5 catheter for LCA. Angiography performed in standard projections. 6 Macedonian JR4 catheter placed in LV for hemodynamics.     Selective right femoral angiogram done assess anatomy for closure. Specimen sent to: No specimen collected  Estimated blood loss: 10 cc  Closure:  Perclose       IV was maintained by RN and moderate conscious sedation of versed and fentanyl was given. Patient was assessed and monitoring of oxygen, heart rate and blood pressure by nurse and myself during the exam 35 minutes.       Chin Iqbal MD  08/12/22

## 2022-08-12 NOTE — TELEPHONE ENCOUNTER
Contacted patient in regards of message below, pt states surgery was rescheduled due to abnormal EKG and stress echo. Patient was informed he has to get cleared from cardio before  can clear him for surgery. Patient verbalized understating, has appt with cardio 08/19 will contact office when cleared.

## 2022-08-14 RX ORDER — BLOOD-GLUCOSE METER
1 EACH MISCELLANEOUS 2 TIMES DAILY
Qty: 1 KIT | Refills: 0 | Status: SHIPPED | OUTPATIENT
Start: 2022-08-14

## 2022-09-01 NOTE — LETTER
02/01/21    Carroll Salinas      Dear Florin Mendez,    Our records indicate that you have outstanding lab work and or testing that was ordered for you and has not yet been completed:  Orders Placed This Encounter
Statement Selected

## 2022-09-11 DIAGNOSIS — E11.69 TYPE 2 DIABETES MELLITUS WITH OTHER SPECIFIED COMPLICATION, WITHOUT LONG-TERM CURRENT USE OF INSULIN (HCC): ICD-10-CM

## 2022-09-11 RX ORDER — EMPAGLIFLOZIN 25 MG/1
1 TABLET, FILM COATED ORAL DAILY
Qty: 90 TABLET | Refills: 0 | Status: SHIPPED | OUTPATIENT
Start: 2022-09-11

## 2022-09-13 ENCOUNTER — OFFICE VISIT (OUTPATIENT)
Dept: FAMILY MEDICINE CLINIC | Facility: CLINIC | Age: 57
End: 2022-09-13
Payer: COMMERCIAL

## 2022-09-13 ENCOUNTER — TELEPHONE (OUTPATIENT)
Dept: FAMILY MEDICINE CLINIC | Facility: CLINIC | Age: 57
End: 2022-09-13

## 2022-09-13 VITALS
DIASTOLIC BLOOD PRESSURE: 67 MMHG | WEIGHT: 197 LBS | BODY MASS INDEX: 28.2 KG/M2 | HEIGHT: 70 IN | HEART RATE: 66 BPM | RESPIRATION RATE: 18 BRPM | SYSTOLIC BLOOD PRESSURE: 104 MMHG

## 2022-09-13 DIAGNOSIS — E11.9 DIABETES MELLITUS TYPE 2 IN NONOBESE (HCC): ICD-10-CM

## 2022-09-13 DIAGNOSIS — M51.36 BULGE OF LUMBAR DISC WITHOUT MYELOPATHY: ICD-10-CM

## 2022-09-13 DIAGNOSIS — Z01.818 PREOP EXAMINATION: Primary | ICD-10-CM

## 2022-09-13 PROCEDURE — 3078F DIAST BP <80 MM HG: CPT | Performed by: FAMILY MEDICINE

## 2022-09-13 PROCEDURE — 3074F SYST BP LT 130 MM HG: CPT | Performed by: FAMILY MEDICINE

## 2022-09-13 PROCEDURE — 99214 OFFICE O/P EST MOD 30 MIN: CPT | Performed by: FAMILY MEDICINE

## 2022-09-13 PROCEDURE — 3008F BODY MASS INDEX DOCD: CPT | Performed by: FAMILY MEDICINE

## 2022-09-13 RX ORDER — TADALAFIL 20 MG/1
20 TABLET ORAL
Qty: 30 TABLET | Refills: 0 | Status: SHIPPED | OUTPATIENT
Start: 2022-09-13

## 2022-09-21 ENCOUNTER — LAB ENCOUNTER (OUTPATIENT)
Dept: LAB | Age: 57
End: 2022-09-21
Attending: PHYSICIAN ASSISTANT

## 2022-09-21 DIAGNOSIS — M48.061 SPINAL STENOSIS, LUMBAR REGION, WITHOUT NEUROGENIC CLAUDICATION: Primary | ICD-10-CM

## 2022-09-21 LAB
BASOPHILS # BLD AUTO: 0.03 X10(3) UL (ref 0–0.2)
BASOPHILS NFR BLD AUTO: 0.4 %
DEPRECATED RDW RBC AUTO: 38 FL (ref 35.1–46.3)
EOSINOPHIL # BLD AUTO: 0.31 X10(3) UL (ref 0–0.7)
EOSINOPHIL NFR BLD AUTO: 4.5 %
ERYTHROCYTE [DISTWIDTH] IN BLOOD BY AUTOMATED COUNT: 11.3 % (ref 11–15)
HCT VFR BLD AUTO: 45.3 %
HGB BLD-MCNC: 15.9 G/DL
IMM GRANULOCYTES # BLD AUTO: 0.01 X10(3) UL (ref 0–1)
IMM GRANULOCYTES NFR BLD: 0.1 %
LYMPHOCYTES # BLD AUTO: 2.79 X10(3) UL (ref 1–4)
LYMPHOCYTES NFR BLD AUTO: 40.8 %
MCH RBC QN AUTO: 31.9 PG (ref 26–34)
MCHC RBC AUTO-ENTMCNC: 35.1 G/DL (ref 31–37)
MCV RBC AUTO: 91 FL
MONOCYTES # BLD AUTO: 0.62 X10(3) UL (ref 0.1–1)
MONOCYTES NFR BLD AUTO: 9.1 %
NEUTROPHILS # BLD AUTO: 3.07 X10 (3) UL (ref 1.5–7.7)
NEUTROPHILS # BLD AUTO: 3.07 X10(3) UL (ref 1.5–7.7)
NEUTROPHILS NFR BLD AUTO: 45.1 %
PLATELET # BLD AUTO: 272 10(3)UL (ref 150–450)
RBC # BLD AUTO: 4.98 X10(6)UL
WBC # BLD AUTO: 6.8 X10(3) UL (ref 4–11)

## 2022-09-21 PROCEDURE — 87641 MR-STAPH DNA AMP PROBE: CPT

## 2022-09-21 PROCEDURE — 36415 COLL VENOUS BLD VENIPUNCTURE: CPT

## 2022-09-21 PROCEDURE — 85025 COMPLETE CBC W/AUTO DIFF WBC: CPT

## 2022-09-22 LAB — MRSA DNA SPEC QL NAA+PROBE: NEGATIVE

## 2022-09-25 ENCOUNTER — LAB ENCOUNTER (OUTPATIENT)
Dept: LAB | Facility: HOSPITAL | Age: 57
End: 2022-09-25
Attending: NEUROLOGICAL SURGERY

## 2022-09-25 DIAGNOSIS — Z01.818 PREOP TESTING: ICD-10-CM

## 2022-09-25 LAB — SARS-COV-2 RNA RESP QL NAA+PROBE: NOT DETECTED

## 2022-09-26 ENCOUNTER — TELEPHONE (OUTPATIENT)
Dept: FAMILY MEDICINE CLINIC | Facility: CLINIC | Age: 57
End: 2022-09-26

## 2022-09-26 NOTE — TELEPHONE ENCOUNTER
Per patient, his insurance company sent forms in regards to his upcoming surgery. Patient does not know how the forms where sent.

## 2022-09-26 NOTE — TELEPHONE ENCOUNTER
Pt on the phone stating he spoke with his insurance today and was informed that the insurance will like better clarification why the pt is taking the following medication. Pt states these forms have been faxed to Dr. Jose David Gamez office and should be completed before scheduled Surgery on 9/28.  Please advise    lisinopril 2.5 MG Oral Tab

## 2022-09-28 ENCOUNTER — APPOINTMENT (OUTPATIENT)
Dept: GENERAL RADIOLOGY | Facility: HOSPITAL | Age: 57
End: 2022-09-28
Attending: NEUROLOGICAL SURGERY
Payer: COMMERCIAL

## 2022-09-28 ENCOUNTER — HOSPITAL ENCOUNTER (INPATIENT)
Facility: HOSPITAL | Age: 57
LOS: 3 days | Discharge: HOME | End: 2022-10-01
Attending: NEUROLOGICAL SURGERY | Admitting: NEUROLOGICAL SURGERY
Payer: COMMERCIAL

## 2022-09-28 ENCOUNTER — ANESTHESIA EVENT (OUTPATIENT)
Dept: SURGERY | Facility: HOSPITAL | Age: 57
End: 2022-09-28
Payer: COMMERCIAL

## 2022-09-28 ENCOUNTER — ANESTHESIA (OUTPATIENT)
Dept: SURGERY | Facility: HOSPITAL | Age: 57
End: 2022-09-28
Payer: COMMERCIAL

## 2022-09-28 DIAGNOSIS — Z01.818 PREOP TESTING: Primary | ICD-10-CM

## 2022-09-28 DIAGNOSIS — I82.4Z2 LOWER LEG DVT (DEEP VENOUS THROMBOEMBOLISM), ACUTE, LEFT (HCC): ICD-10-CM

## 2022-09-28 PROBLEM — E78.5 HYPERLIPIDEMIA: Status: ACTIVE | Noted: 2018-01-19

## 2022-09-28 PROBLEM — M48.061 LUMBAR SPINAL STENOSIS: Status: ACTIVE | Noted: 2019-09-09

## 2022-09-28 PROBLEM — I10 ESSENTIAL HYPERTENSION: Chronic | Status: ACTIVE | Noted: 2022-09-28

## 2022-09-28 LAB
GLUCOSE BLDC GLUCOMTR-MCNC: 103 MG/DL (ref 70–99)
GLUCOSE BLDC GLUCOMTR-MCNC: 199 MG/DL (ref 70–99)
GLUCOSE BLDC GLUCOMTR-MCNC: 218 MG/DL (ref 70–99)
GLUCOSE BLDC GLUCOMTR-MCNC: 222 MG/DL (ref 70–99)

## 2022-09-28 PROCEDURE — 76000 FLUOROSCOPY <1 HR PHYS/QHP: CPT | Performed by: NEUROLOGICAL SURGERY

## 2022-09-28 PROCEDURE — 00NY0ZZ RELEASE LUMBAR SPINAL CORD, OPEN APPROACH: ICD-10-PCS | Performed by: NEUROLOGICAL SURGERY

## 2022-09-28 PROCEDURE — 99232 SBSQ HOSP IP/OBS MODERATE 35: CPT | Performed by: HOSPITALIST

## 2022-09-28 RX ORDER — NICOTINE POLACRILEX 4 MG
30 LOZENGE BUCCAL
Status: DISCONTINUED | OUTPATIENT
Start: 2022-09-28 | End: 2022-10-01

## 2022-09-28 RX ORDER — DEXTROSE MONOHYDRATE 25 G/50ML
50 INJECTION, SOLUTION INTRAVENOUS
Status: DISCONTINUED | OUTPATIENT
Start: 2022-09-28 | End: 2022-10-01

## 2022-09-28 RX ORDER — ONDANSETRON 2 MG/ML
4 INJECTION INTRAMUSCULAR; INTRAVENOUS EVERY 6 HOURS PRN
Status: DISCONTINUED | OUTPATIENT
Start: 2022-09-28 | End: 2022-10-01

## 2022-09-28 RX ORDER — ROSUVASTATIN CALCIUM 20 MG/1
40 TABLET, COATED ORAL NIGHTLY
Status: DISCONTINUED | OUTPATIENT
Start: 2022-09-28 | End: 2022-10-01

## 2022-09-28 RX ORDER — MORPHINE SULFATE 4 MG/ML
2 INJECTION, SOLUTION INTRAMUSCULAR; INTRAVENOUS EVERY 10 MIN PRN
Status: DISCONTINUED | OUTPATIENT
Start: 2022-09-28 | End: 2022-09-28

## 2022-09-28 RX ORDER — POLYETHYLENE GLYCOL 3350 17 G/17G
17 POWDER, FOR SOLUTION ORAL DAILY PRN
Status: DISCONTINUED | OUTPATIENT
Start: 2022-09-28 | End: 2022-10-01

## 2022-09-28 RX ORDER — DIAZEPAM 5 MG/1
10 TABLET ORAL EVERY 8 HOURS PRN
Status: DISCONTINUED | OUTPATIENT
Start: 2022-09-28 | End: 2022-10-01

## 2022-09-28 RX ORDER — LISINOPRIL 2.5 MG/1
2.5 TABLET ORAL DAILY
Status: DISCONTINUED | OUTPATIENT
Start: 2022-09-29 | End: 2022-10-01

## 2022-09-28 RX ORDER — NICOTINE POLACRILEX 4 MG
15 LOZENGE BUCCAL
Status: DISCONTINUED | OUTPATIENT
Start: 2022-09-28 | End: 2022-10-01

## 2022-09-28 RX ORDER — DEXTROSE MONOHYDRATE 25 G/50ML
50 INJECTION, SOLUTION INTRAVENOUS
Status: DISCONTINUED | OUTPATIENT
Start: 2022-09-28 | End: 2022-09-28 | Stop reason: HOSPADM

## 2022-09-28 RX ORDER — NICOTINE POLACRILEX 4 MG
30 LOZENGE BUCCAL
Status: DISCONTINUED | OUTPATIENT
Start: 2022-09-28 | End: 2022-09-28 | Stop reason: HOSPADM

## 2022-09-28 RX ORDER — DIPHENHYDRAMINE HCL 25 MG
25 CAPSULE ORAL EVERY 4 HOURS PRN
Status: DISCONTINUED | OUTPATIENT
Start: 2022-09-28 | End: 2022-10-01

## 2022-09-28 RX ORDER — DIPHENHYDRAMINE HYDROCHLORIDE 50 MG/ML
25 INJECTION INTRAMUSCULAR; INTRAVENOUS EVERY 4 HOURS PRN
Status: DISCONTINUED | OUTPATIENT
Start: 2022-09-28 | End: 2022-10-01

## 2022-09-28 RX ORDER — PREGABALIN 50 MG/1
50 CAPSULE ORAL 2 TIMES DAILY
Status: DISCONTINUED | OUTPATIENT
Start: 2022-09-28 | End: 2022-09-30

## 2022-09-28 RX ORDER — NEOSTIGMINE METHYLSULFATE 1 MG/ML
INJECTION, SOLUTION INTRAVENOUS AS NEEDED
Status: DISCONTINUED | OUTPATIENT
Start: 2022-09-28 | End: 2022-09-28 | Stop reason: SURG

## 2022-09-28 RX ORDER — CEFAZOLIN SODIUM/WATER 2 G/20 ML
2 SYRINGE (ML) INTRAVENOUS ONCE
Status: COMPLETED | OUTPATIENT
Start: 2022-09-28 | End: 2022-09-28

## 2022-09-28 RX ORDER — LIDOCAINE HYDROCHLORIDE 10 MG/ML
INJECTION, SOLUTION EPIDURAL; INFILTRATION; INTRACAUDAL; PERINEURAL AS NEEDED
Status: DISCONTINUED | OUTPATIENT
Start: 2022-09-28 | End: 2022-09-28 | Stop reason: SURG

## 2022-09-28 RX ORDER — BISACODYL 10 MG
10 SUPPOSITORY, RECTAL RECTAL
Status: DISCONTINUED | OUTPATIENT
Start: 2022-09-28 | End: 2022-10-01

## 2022-09-28 RX ORDER — HYDROMORPHONE HYDROCHLORIDE 1 MG/ML
0.4 INJECTION, SOLUTION INTRAMUSCULAR; INTRAVENOUS; SUBCUTANEOUS EVERY 2 HOUR PRN
Status: DISCONTINUED | OUTPATIENT
Start: 2022-09-28 | End: 2022-10-01

## 2022-09-28 RX ORDER — ONDANSETRON 2 MG/ML
INJECTION INTRAMUSCULAR; INTRAVENOUS AS NEEDED
Status: DISCONTINUED | OUTPATIENT
Start: 2022-09-28 | End: 2022-09-28 | Stop reason: SURG

## 2022-09-28 RX ORDER — SENNOSIDES 8.6 MG
17.2 TABLET ORAL NIGHTLY
Status: DISCONTINUED | OUTPATIENT
Start: 2022-09-28 | End: 2022-10-01

## 2022-09-28 RX ORDER — MORPHINE SULFATE 4 MG/ML
4 INJECTION, SOLUTION INTRAMUSCULAR; INTRAVENOUS EVERY 10 MIN PRN
Status: DISCONTINUED | OUTPATIENT
Start: 2022-09-28 | End: 2022-09-28 | Stop reason: HOSPADM

## 2022-09-28 RX ORDER — NICOTINE POLACRILEX 4 MG
15 LOZENGE BUCCAL
Status: DISCONTINUED | OUTPATIENT
Start: 2022-09-28 | End: 2022-09-28 | Stop reason: HOSPADM

## 2022-09-28 RX ORDER — OXYCODONE HYDROCHLORIDE 5 MG/1
10 TABLET ORAL EVERY 4 HOURS PRN
Status: DISCONTINUED | OUTPATIENT
Start: 2022-09-28 | End: 2022-10-01

## 2022-09-28 RX ORDER — CEFAZOLIN SODIUM/WATER 2 G/20 ML
2 SYRINGE (ML) INTRAVENOUS EVERY 8 HOURS
Status: COMPLETED | OUTPATIENT
Start: 2022-09-28 | End: 2022-09-29

## 2022-09-28 RX ORDER — ROCURONIUM BROMIDE 10 MG/ML
INJECTION, SOLUTION INTRAVENOUS AS NEEDED
Status: DISCONTINUED | OUTPATIENT
Start: 2022-09-28 | End: 2022-09-28 | Stop reason: SURG

## 2022-09-28 RX ORDER — SODIUM CHLORIDE, SODIUM LACTATE, POTASSIUM CHLORIDE, CALCIUM CHLORIDE 600; 310; 30; 20 MG/100ML; MG/100ML; MG/100ML; MG/100ML
INJECTION, SOLUTION INTRAVENOUS CONTINUOUS
Status: DISCONTINUED | OUTPATIENT
Start: 2022-09-28 | End: 2022-10-01

## 2022-09-28 RX ORDER — DEXAMETHASONE SODIUM PHOSPHATE 4 MG/ML
VIAL (ML) INJECTION AS NEEDED
Status: DISCONTINUED | OUTPATIENT
Start: 2022-09-28 | End: 2022-09-28 | Stop reason: SURG

## 2022-09-28 RX ORDER — OXYCODONE HYDROCHLORIDE 5 MG/1
5 TABLET ORAL EVERY 4 HOURS PRN
Status: DISCONTINUED | OUTPATIENT
Start: 2022-09-28 | End: 2022-10-01

## 2022-09-28 RX ORDER — GLYCOPYRROLATE 0.2 MG/ML
INJECTION, SOLUTION INTRAMUSCULAR; INTRAVENOUS AS NEEDED
Status: DISCONTINUED | OUTPATIENT
Start: 2022-09-28 | End: 2022-09-28 | Stop reason: SURG

## 2022-09-28 RX ORDER — SODIUM PHOSPHATE, DIBASIC AND SODIUM PHOSPHATE, MONOBASIC 7; 19 G/133ML; G/133ML
1 ENEMA RECTAL ONCE AS NEEDED
Status: DISCONTINUED | OUTPATIENT
Start: 2022-09-28 | End: 2022-10-01

## 2022-09-28 RX ORDER — HYDROMORPHONE HYDROCHLORIDE 1 MG/ML
0.4 INJECTION, SOLUTION INTRAMUSCULAR; INTRAVENOUS; SUBCUTANEOUS EVERY 5 MIN PRN
Status: DISCONTINUED | OUTPATIENT
Start: 2022-09-28 | End: 2022-09-28 | Stop reason: HOSPADM

## 2022-09-28 RX ORDER — PANTOPRAZOLE SODIUM 40 MG/1
40 TABLET, DELAYED RELEASE ORAL
Status: DISCONTINUED | OUTPATIENT
Start: 2022-09-29 | End: 2022-10-01

## 2022-09-28 RX ORDER — SODIUM CHLORIDE, SODIUM LACTATE, POTASSIUM CHLORIDE, CALCIUM CHLORIDE 600; 310; 30; 20 MG/100ML; MG/100ML; MG/100ML; MG/100ML
INJECTION, SOLUTION INTRAVENOUS CONTINUOUS
Status: DISCONTINUED | OUTPATIENT
Start: 2022-09-28 | End: 2022-09-28 | Stop reason: HOSPADM

## 2022-09-28 RX ORDER — DOCUSATE SODIUM 100 MG/1
100 CAPSULE, LIQUID FILLED ORAL 2 TIMES DAILY
Status: DISCONTINUED | OUTPATIENT
Start: 2022-09-28 | End: 2022-10-01

## 2022-09-28 RX ORDER — HYDROMORPHONE HYDROCHLORIDE 1 MG/ML
0.2 INJECTION, SOLUTION INTRAMUSCULAR; INTRAVENOUS; SUBCUTANEOUS EVERY 5 MIN PRN
Status: DISCONTINUED | OUTPATIENT
Start: 2022-09-28 | End: 2022-09-28

## 2022-09-28 RX ORDER — ONDANSETRON 2 MG/ML
4 INJECTION INTRAMUSCULAR; INTRAVENOUS EVERY 6 HOURS PRN
Status: DISCONTINUED | OUTPATIENT
Start: 2022-09-28 | End: 2022-09-28 | Stop reason: HOSPADM

## 2022-09-28 RX ORDER — CALCIUM CHLORIDE 100 MG/ML
INJECTION INTRAVENOUS; INTRAVENTRICULAR AS NEEDED
Status: DISCONTINUED | OUTPATIENT
Start: 2022-09-28 | End: 2022-09-28 | Stop reason: SURG

## 2022-09-28 RX ORDER — HYDROMORPHONE HYDROCHLORIDE 1 MG/ML
0.8 INJECTION, SOLUTION INTRAMUSCULAR; INTRAVENOUS; SUBCUTANEOUS EVERY 2 HOUR PRN
Status: DISCONTINUED | OUTPATIENT
Start: 2022-09-28 | End: 2022-10-01

## 2022-09-28 RX ORDER — PROCHLORPERAZINE EDISYLATE 5 MG/ML
5 INJECTION INTRAMUSCULAR; INTRAVENOUS EVERY 8 HOURS PRN
Status: DISCONTINUED | OUTPATIENT
Start: 2022-09-28 | End: 2022-10-01

## 2022-09-28 RX ORDER — MORPHINE SULFATE 10 MG/ML
6 INJECTION, SOLUTION INTRAMUSCULAR; INTRAVENOUS EVERY 10 MIN PRN
Status: DISCONTINUED | OUTPATIENT
Start: 2022-09-28 | End: 2022-09-28

## 2022-09-28 RX ORDER — BUPIVACAINE HYDROCHLORIDE 2.5 MG/ML
INJECTION, SOLUTION EPIDURAL; INFILTRATION; INTRACAUDAL AS NEEDED
Status: DISCONTINUED | OUTPATIENT
Start: 2022-09-28 | End: 2022-09-28 | Stop reason: HOSPADM

## 2022-09-28 RX ORDER — HYDROMORPHONE HYDROCHLORIDE 1 MG/ML
0.6 INJECTION, SOLUTION INTRAMUSCULAR; INTRAVENOUS; SUBCUTANEOUS EVERY 5 MIN PRN
Status: DISCONTINUED | OUTPATIENT
Start: 2022-09-28 | End: 2022-09-28

## 2022-09-28 RX ORDER — PROCHLORPERAZINE EDISYLATE 5 MG/ML
5 INJECTION INTRAMUSCULAR; INTRAVENOUS EVERY 8 HOURS PRN
Status: DISCONTINUED | OUTPATIENT
Start: 2022-09-28 | End: 2022-09-28

## 2022-09-28 RX ORDER — NALOXONE HYDROCHLORIDE 0.4 MG/ML
80 INJECTION, SOLUTION INTRAMUSCULAR; INTRAVENOUS; SUBCUTANEOUS AS NEEDED
Status: DISCONTINUED | OUTPATIENT
Start: 2022-09-28 | End: 2022-09-28 | Stop reason: HOSPADM

## 2022-09-28 RX ORDER — MIDAZOLAM HYDROCHLORIDE 1 MG/ML
INJECTION INTRAMUSCULAR; INTRAVENOUS AS NEEDED
Status: DISCONTINUED | OUTPATIENT
Start: 2022-09-28 | End: 2022-09-28 | Stop reason: SURG

## 2022-09-28 RX ORDER — ACETAMINOPHEN 500 MG
1000 TABLET ORAL ONCE
Status: COMPLETED | OUTPATIENT
Start: 2022-09-28 | End: 2022-09-28

## 2022-09-28 RX ADMIN — ROCURONIUM BROMIDE 50 MG: 10 INJECTION, SOLUTION INTRAVENOUS at 07:34:00

## 2022-09-28 RX ADMIN — LIDOCAINE HYDROCHLORIDE 50 MG: 10 INJECTION, SOLUTION EPIDURAL; INFILTRATION; INTRACAUDAL; PERINEURAL at 07:33:00

## 2022-09-28 RX ADMIN — CEFAZOLIN SODIUM/WATER 2 G: 2 G/20 ML SYRINGE (ML) INTRAVENOUS at 07:59:00

## 2022-09-28 RX ADMIN — GLYCOPYRROLATE 0.2 MG: 0.2 INJECTION, SOLUTION INTRAMUSCULAR; INTRAVENOUS at 07:32:00

## 2022-09-28 RX ADMIN — ROCURONIUM BROMIDE 20 MG: 10 INJECTION, SOLUTION INTRAVENOUS at 08:33:00

## 2022-09-28 RX ADMIN — NEOSTIGMINE METHYLSULFATE 2.5 MG: 1 INJECTION, SOLUTION INTRAVENOUS at 12:20:00

## 2022-09-28 RX ADMIN — CEFAZOLIN SODIUM/WATER 2 G: 2 G/20 ML SYRINGE (ML) INTRAVENOUS at 12:09:00

## 2022-09-28 RX ADMIN — MIDAZOLAM HYDROCHLORIDE 2 MG: 1 INJECTION INTRAMUSCULAR; INTRAVENOUS at 07:28:00

## 2022-09-28 RX ADMIN — GLYCOPYRROLATE 0.5 MG: 0.2 INJECTION, SOLUTION INTRAMUSCULAR; INTRAVENOUS at 12:20:00

## 2022-09-28 RX ADMIN — CALCIUM CHLORIDE 1 G: 100 INJECTION INTRAVENOUS; INTRAVENTRICULAR at 11:04:00

## 2022-09-28 RX ADMIN — SODIUM CHLORIDE, SODIUM LACTATE, POTASSIUM CHLORIDE, CALCIUM CHLORIDE: 600; 310; 30; 20 INJECTION, SOLUTION INTRAVENOUS at 12:40:00

## 2022-09-28 RX ADMIN — SODIUM CHLORIDE, SODIUM LACTATE, POTASSIUM CHLORIDE, CALCIUM CHLORIDE: 600; 310; 30; 20 INJECTION, SOLUTION INTRAVENOUS at 11:12:00

## 2022-09-28 RX ADMIN — DEXAMETHASONE SODIUM PHOSPHATE 4 MG: 4 MG/ML VIAL (ML) INJECTION at 07:32:00

## 2022-09-28 RX ADMIN — ONDANSETRON 4 MG: 2 INJECTION INTRAMUSCULAR; INTRAVENOUS at 09:25:00

## 2022-09-28 NOTE — BRIEF OP NOTE
Pre-Operative Diagnosis: Chronic left sided low back pain with bilateral sciatica, lumbar radiculopathy, acute and chronic      Post-Operative Diagnosis: Severe spinal stenosis L3-4 and L4-5, most severe of the L4-5 level. Procedure Performed:   Bilateral L3-4, L4-5 laminectomy    Surgeon(s) and Role:     Mis Paiz MD - Primary    Assistant(s):  Majo Arnold, [facility]     Surgical Findings: Very sever stenosis and both levels and inflamed nerve roots bilaterally.      Specimen: none     Estimated Blood Loss: Blood Output: 100 mL (9/28/2022 11:26 AM)      Marielena Downs MD  9/28/2022  1:12 PM

## 2022-09-28 NOTE — PLAN OF CARE
Christian is from home with his wife. Pod 0b/l L3-4 L4-5 laminectomy. Cms wnl. Afebrile. Dressing to back with telfa and tegaderm c/d/I. PT/OT eval pending. Scd/teds b/l le. Ac/hs glucose monitoring with sliding scale as indicated. -diabetic handbook given to patient. Oxy ir for pain-iv medication available for btp. Safety intact. Primary language is Monegasque, patient and family informed of  services-declined at this time. Hemovac-patent, bishop cath present and patent. POC TBD-tentatively home with family  Problem: Patient Centered Care  Goal: Patient preferences are identified and integrated in the patient's plan of care  Description: Interventions:  - What would you like us to know as we care for you?  Lives with son and spouse  - Provide timely, complete, and accurate information to patient/family  - Incorporate patient and family knowledge, values, beliefs, and cultural backgrounds into the planning and delivery of care  - Encourage patient/family to participate in care and decision-making at the level they choose  - Honor patient and family perspectives and choices  Outcome: Progressing     Problem: Diabetes/Glucose Control  Goal: Glucose maintained within prescribed range  Description: INTERVENTIONS:  - Monitor Blood Glucose as ordered  - Assess for signs and symptoms of hyperglycemia and hypoglycemia  - Administer ordered medications to maintain glucose within target range  - Assess barriers to adequate nutritional intake and initiate nutrition consult as needed  - Instruct patient on self management of diabetes  Outcome: Progressing     Problem: Patient/Family Goals  Goal: Patient/Family Long Term Goal  Description: Patient's Long Term Goal: ind with adls    Interventions:  - pt/ot  [facility] planning  Pain control  - See additional Care Plan goals for specific interventions  Outcome: Progressing  Goal: Patient/Family Short Term Goal  Description: Patient's Short Term Goal: home tomorrow    Interventions: - pt/ot  [facility] planning  Pain control  - See additional Care Plan goals for specific interventions  Outcome: Progressing     Problem: PAIN - ADULT  Goal: Verbalizes/displays adequate comfort level or patient's stated pain goal  Description: INTERVENTIONS:  - Encourage pt to monitor pain and request assistance  - Assess pain using appropriate pain scale  - Administer analgesics based on type and severity of pain and evaluate response  - Implement non-pharmacological measures as appropriate and evaluate response  - Consider cultural and social influences on pain and pain management  - Manage/alleviate anxiety  - Utilize distraction and/or relaxation techniques  - Monitor for opioid side effects  - Notify MD/LIP if interventions unsuccessful or patient reports new pain  - Anticipate increased pain with activity and pre-medicate as appropriate  Outcome: Progressing     Problem: RISK FOR INFECTION - ADULT  Goal: Absence of fever/infection during anticipated neutropenic period  Description: INTERVENTIONS  - Monitor WBC  - Administer growth factors as ordered  - Implement neutropenic guidelines  Outcome: Progressing     Problem: SAFETY ADULT - FALL  Goal: Free from fall injury  Description: INTERVENTIONS:  - Assess pt frequently for physical needs  - Identify cognitive and physical deficits and behaviors that affect risk of falls.   - [place] fall precautions as indicated by assessment.  - Educate pt/family on patient safety including physical limitations  - Instruct pt to call for assistance with activity based on assessment  - Modify environment to reduce risk of injury  - Provide assistive devices as appropriate  - Consider OT/PT consult to assist with strengthening/mobility  - Encourage toileting schedule  Outcome: Progressing     Problem: DISCHARGE PLANNING  Goal: Discharge to home or other facility with appropriate resources  Description: INTERVENTIONS:  - Identify barriers to discharge w/pt and caregiver  - Include patient/family/discharge partner in discharge planning  - Arrange for needed discharge resources and transportation as appropriate  - Identify discharge learning needs (meds, wound care, etc)  - Arrange for interpreters to assist at discharge as needed  - Consider post-discharge preferences of patient/family/discharge partner  - Complete POLST form as appropriate  - Assess patient's ability to be responsible for managing their own health  - Refer to Case Management Department for coordinating discharge planning if the patient needs post-hospital services based on physician/LIP order or complex needs related to functional status, cognitive ability or social support system  Outcome: Progressing     Problem: Altered Communication/Language Barrier  Goal: Patient/Family is able to understand and participate in their care  Description: Interventions:  - Assess communication ability and preferred communication style  - Implement communication aides and strategies  - Use visual cues when possible  - Listen attentively, be patient, do not interrupt  - Minimize distractions  - Allow time for understanding and response  - Establish method for patient to ask for assistance (call light)  - Provide an  as needed  - Communicate barriers and strategies to overcome with those who interact with patient  Outcome: Progressing

## 2022-09-28 NOTE — INTERVAL H&P NOTE
Pre-op Diagnosis: Chronic left sided low back pain with left sided sciatica, lumbar radiculopathy, acute    The above referenced H&P was reviewed by Gopal Borden MD on 9/28/2022, the patient was examined and no significant changes have occurred in the patient's condition since the H&P was performed. I discussed with the patient and/or legal representative the potential benefits, risks and side effects of this procedure; the likelihood of the patient achieving goals; and potential problems that might occur during recuperation. I discussed reasonable alternatives to the procedure, including risks, benefits and side effects related to the alternatives and risks related to not receiving this procedure. I explained to him that the probability of having a good outcome is about 85% and the risk of complications is low in the order for about 1% surgical risk in about 8.1% serious complication from anesthesia. He understood and wants to proceed with procedure as planned. He will have an L4-5 laminectomy. He will have a drain put in after surgery and likely be able to go home by Friday.

## 2022-09-28 NOTE — ANESTHESIA PROCEDURE NOTES
Airway  Date/Time: 9/28/2022 7:36 AM  Urgency: Elective    Airway not difficult    General Information and Staff    Patient location during procedure: OR  Anesthesiologist: Monserrat Lee MD  Resident/CRNA: Therese Villegas CRNA  Performed: CRNA     Indications and Patient Condition  Indications for airway management: anesthesia  Sedation level: deep  Preoxygenated: yes  Patient position: sniffing  Mask difficulty assessment: 2 - vent by mask + OA or adjuvant +/- NMBA    Final Airway Details  Final airway type: endotracheal airway      Successful airway: ETT  Cuffed: yes   Successful intubation technique: direct laryngoscopy  Facilitating devices/methods: intubating stylet  Endotracheal tube insertion site: oral  Blade: Home  Blade size: #3  ETT size (mm): 7.5    Cormack-Lehane Classification: grade I - full view of glottis  Placement verified by: chest auscultation and capnometry   Measured from: teeth  ETT to teeth (cm): 21  Number of attempts at approach: 1

## 2022-09-29 LAB
GLUCOSE BLDC GLUCOMTR-MCNC: 175 MG/DL (ref 70–99)
GLUCOSE BLDC GLUCOMTR-MCNC: 205 MG/DL (ref 70–99)
GLUCOSE BLDC GLUCOMTR-MCNC: 262 MG/DL (ref 70–99)
GLUCOSE BLDC GLUCOMTR-MCNC: 279 MG/DL (ref 70–99)
HCT VFR BLD AUTO: 42.5 %
HGB BLD-MCNC: 15 G/DL

## 2022-09-29 PROCEDURE — 99232 SBSQ HOSP IP/OBS MODERATE 35: CPT | Performed by: HOSPITALIST

## 2022-09-29 RX ORDER — DIAPER,BRIEF,INFANT-TODD,DISP
EACH MISCELLANEOUS 2 TIMES DAILY
Status: DISCONTINUED | OUTPATIENT
Start: 2022-09-29 | End: 2022-10-01

## 2022-09-29 RX ORDER — HYDROCODONE BITARTRATE AND ACETAMINOPHEN 5; 325 MG/1; MG/1
1 TABLET ORAL EVERY 6 HOURS PRN
Qty: 60 TABLET | Refills: 0 | Status: SHIPPED | OUTPATIENT
Start: 2022-09-29 | End: 2022-10-14

## 2022-09-29 RX ORDER — NALOXONE HYDROCHLORIDE 4 MG/.1ML
4 SPRAY NASAL AS NEEDED
Qty: 1 KIT | Refills: 0 | Status: SHIPPED | OUTPATIENT
Start: 2022-09-29

## 2022-09-29 NOTE — PLAN OF CARE
Pt alert & oriented x4, resting in bed. Wife at bedside. Predominantly Citizen of [place] speaking, but able to understand commands. Pain managed with prn oxy. AC&HS. Denies N/V. Dressing CDI. Hemovac in place, draining - see flowsheet. Rosales removed per protocol. Safety measures in place. Call light within reach. Bed locked, in lowest position, bed alarm on. Problem: Patient Centered Care  Goal: Patient preferences are identified and integrated in the patient's plan of care  Description: Interventions:  - What would you like us to know as we care for you?  Would like his pain managed  - Provide timely, complete, and accurate information to patient/family  - Incorporate patient and family knowledge, values, beliefs, and cultural backgrounds into the planning and delivery of care  - Encourage patient/family to participate in care and decision-making at the level they choose  - Honor patient and family perspectives and choices  Outcome: Progressing     Problem: Diabetes/Glucose Control  Goal: Glucose maintained within prescribed range  Description: INTERVENTIONS:  - Monitor Blood Glucose as ordered  - Assess for signs and symptoms of hyperglycemia and hypoglycemia  - Administer ordered medications to maintain glucose within target range  - Assess barriers to adequate nutritional intake and initiate nutrition consult as needed  - Instruct patient on self management of diabetes  Outcome: Progressing     Problem: PAIN - ADULT  Goal: Verbalizes/displays adequate comfort level or patient's stated pain goal  Description: INTERVENTIONS:  - Encourage pt to monitor pain and request assistance  - Assess pain using appropriate pain scale  - Administer analgesics based on type and severity of pain and evaluate response  - Implement non-pharmacological measures as appropriate and evaluate response  - Consider cultural and social influences on pain and pain management  - Manage/alleviate anxiety  - Utilize distraction and/or relaxation techniques  - Monitor for opioid side effects  - Notify MD/LIP if interventions unsuccessful or patient reports new pain  - Anticipate increased pain with activity and pre-medicate as appropriate  Outcome: Progressing     Problem: RISK FOR INFECTION - ADULT  Goal: Absence of fever/infection during anticipated neutropenic period  Description: INTERVENTIONS  - Monitor WBC  - Administer growth factors as ordered  - Implement neutropenic guidelines  Outcome: Progressing     Problem: SAFETY ADULT - FALL  Goal: Free from fall injury  Description: INTERVENTIONS:  - Assess pt frequently for physical needs  - Identify cognitive and physical deficits and behaviors that affect risk of falls.   - [place] fall precautions as indicated by assessment.  - Educate pt/family on patient safety including physical limitations  - Instruct pt to call for assistance with activity based on assessment  - Modify environment to reduce risk of injury  - Provide assistive devices as appropriate  - Consider OT/PT consult to assist with strengthening/mobility  - Encourage toileting schedule  Outcome: Progressing     Problem: DISCHARGE PLANNING  Goal: Discharge to home or other facility with appropriate resources  Description: INTERVENTIONS:  - Identify barriers to discharge w/pt and caregiver  - Include patient/family/discharge partner in discharge planning  - Arrange for needed discharge resources and transportation as appropriate  - Identify discharge learning needs (meds, wound care, etc)  - Arrange for interpreters to assist at discharge as needed  - Consider post-discharge preferences of patient/family/discharge partner  - Complete POLST form as appropriate  - Assess patient's ability to be responsible for managing their own health  - Refer to Case Management Department for coordinating discharge planning if the patient needs post-hospital services based on physician/LIP order or complex needs related to functional status, cognitive ability or social support system  Outcome: Progressing

## 2022-09-29 NOTE — OPERATIVE REPORT
[facility]    PATIENT'S NAME: Rajwinder Bond   ATTENDING PHYSICIAN: Jose Birmingham MD   OPERATING PHYSICIAN: Jose Birmingham MD   PATIENT ACCOUNT#:   [id number]    LOCATION:  [address] [place] [address] RECORD #:   [id number]       YOB: 1965  ADMISSION DATE:       09/28/2022      OPERATION DATE:  09/28/2022    OPERATIVE REPORT    PREOPERATIVE DIAGNOSIS:  Severe spinal stenosis at the junction of L3-4 and very severe stenosis at the junction of L4-5. Operation proposed:  Lumbar laminectomy at L3-4 and L4-5. POSTOPERATIVE DIAGNOSIS:  Very severe stenosis at the L4-5 level and severe stenosis at the L3-4 junction. PROCEDURE:  Laminectomy of the bottom end of L3 and full laminectomy of L4 and L5, thecal sac decompression, and insertion of Hemovac drains. ASSISTANT:  Louise Quezada PA-C.    INDICATIONS:  The patient had neurogenic claudication from spinal stenosis. He had difficulty ambulating. He had to sit down and was restricted to his activities because of the severe cramping in the legs when he was standing and walking. He was very limited due to the severe stenosis, particularly at the L4-5 level. There was also some irregularity of the cauda equina of the nerves at the level of the compression. I told him that the possibility of having a good outcome was 85%, and that there was no guarantee that surgery would take care of the problem fully. I also told him that the incidence of complications from anesthesia was very low in the area of about 0.1%, and I told him that we needed to put a drain after the surgery and likely would stay in the hospital for 2 nights. He accepted the risks and the benefits of surgery and agreed to proceed with the recommended operation. All of his questions were answered. OPERATIVE TECHNIQUE:  Under general endotracheal anesthesia in the prone position, he was placed on the Mark table with the Rohan frame.   We carried out the marking of the skin once we did the x-rays to locate the junction of L3-4 and L4-5. Then prepping and draping was done to allow firstly a time-out during which time we identified the patient, the procedure to be done, and we all agreed and surgery began. We started with a linear incision centered at the L3-4 and L4-5 junctions. We made the incision with a #10 scalpel blade, and then with electrocautery, we cauterized through the subcutaneous tissue down to the spinous processes and detached the spinal muscles of the spinous process and lamina of L3, L4, and L5. We placed the Shadow-Line retractors to hold the wound open and then carried out the decompression with PPS power drill using a 3 mm cutting bur and a 3, 4, and 5 mm bc burs. We did the full laminectomy at L4 and L5. We noticed very severe stenosis at the junction of the 2 vertebrae, the L3-4 and L4-5 junctions. The nerves were very inflamed. We could see the swelling of the nerves in both sides, particularly on the left side. He has severe compression of the nerves also on the right side. We fully decompressed the lateral recesses bilaterally. We removed a part of the medial facet joint that was partly causing the severe lateral recess stenosis. The ligamentum flavum was very thick and contributing to the severe stenosis. Once we were able to decompress the thecal sac entirely, we paid particular attention to doing the foraminotomy so we could see the nerves exiting freely through the foramen. Once this was completed, we lined up the laminectomy edges with Surgicel, applied Floseal, and then a piece of Gelfoam on top of the dura. We placed 2 Hemovac drains that were externalized through 2 separate stab wounds. We used a medium size 1/8 of an inch Hemovac drain. There were 2 limbs used.   We then removed the retractors and then connected the drains to suction and carried out the closure of the wound by closing the paraspinal muscle with 2-0 Vicryl using simple interrupted stitches. Then, the fascia was closed with #1 Vicryl using a running locked stitch and then the subcutaneous tissue was closed with 2-0 Vicryl using inverted separate stitches, and finally the skin was closed with 4-0 Monocryl using a subcuticular technique. There were no incidents and no complications. Total estimated blood loss was approximately 100 mL. The final instrument, needle count, and gauze counts were correct. Dictated By Popeye Birmingham MD  d: 09/28/2022 13:36:13  t: 09/28/2022 17:46:33  [facility] [id number]  RTI/

## 2022-09-29 NOTE — PLAN OF CARE
Up with walker to ambulate in [place] with therapy, gait slow, CMS intact and unchanged. Hemovac drain patent. Voiding. Tolerating diet. Oxy IR and Dilaudid and ice packs PRN MD orders. Dc plan is home with spouse when able. 1554- Cream applied to red, burning area on chin (patient stated wife and he noticed it post op , likely from surgical positioning they stated). Ambulated in correa. 1740- C/O left outer superior lower leg soreness/muscle tightness, refused pain medicine since 1401, asymptomatic, no redness, no shortness of breath, no swelling, no erythema noted to area, stated both legs feel \"tight\". Dr Michaeleen Litten stated to continue to monitor closely for now. Problem: Patient Centered Care  Goal: Patient preferences are identified and integrated in the patient's plan of care  Description: Interventions:  - What would you like us to know as we care for you?  *from home with family*  - Provide timely, complete, and accurate information to patient/family  - Incorporate patient and family knowledge, values, beliefs, and cultural backgrounds into the planning and delivery of care  - Encourage patient/family to participate in care and decision-making at the level they choose  - Honor patient and family perspectives and choices  Outcome: Progressing     Problem: Diabetes/Glucose Control  Goal: Glucose maintained within prescribed range  Description: INTERVENTIONS:  - Monitor Blood Glucose as ordered  - Assess for signs and symptoms of hyperglycemia and hypoglycemia  - Administer ordered medications to maintain glucose within target range  - Assess barriers to adequate nutritional intake and initiate nutrition consult as needed  - Instruct patient on self management of diabetes  Outcome: Progressing     Problem: Patient/Family Goals  Goal: Patient/Family Long Term Goal  Description: Patient's Long Term Goal: *return home**    Interventions:  - physical therapy consulted post op  - See additional Care Plan goals for specific interventions  Outcome: Progressing  Goal: Patient/Family Short Term Goal  Description: Patient's Short Term Goal: walk in [place]a    Interventions:   - medicated and assisted out of bed into [place] with walker with assist.   - See additional Care Plan goals for specific interventions  Outcome: Progressing     Problem: PAIN - ADULT  Goal: Verbalizes/displays adequate comfort level or patient's stated pain goal  Description: INTERVENTIONS:  - Encourage pt to monitor pain and request assistance  - Assess pain using appropriate pain scale  - Administer analgesics based on type and severity of pain and evaluate response  - Implement non-pharmacological measures as appropriate and evaluate response  - Consider cultural and social influences on pain and pain management  - Manage/alleviate anxiety  - Utilize distraction and/or relaxation techniques  - Monitor for opioid side effects  - Notify MD/LIP if interventions unsuccessful or patient reports new pain  - Anticipate increased pain with activity and pre-medicate as appropriate  Outcome: Progressing     Problem: RISK FOR INFECTION - ADULT  Goal: Absence of fever/infection during anticipated neutropenic period  Description: INTERVENTIONS  - Monitor WBC  - Administer growth factors as ordered  - Implement neutropenic guidelines  Outcome: Progressing     Problem: SAFETY ADULT - FALL  Goal: Free from fall injury  Description: INTERVENTIONS:  - Assess pt frequently for physical needs  - Identify cognitive and physical deficits and behaviors that affect risk of falls.   - [place] fall precautions as indicated by assessment.  - Educate pt/family on patient safety including physical limitations  - Instruct pt to call for assistance with activity based on assessment  - Modify environment to reduce risk of injury  - Provide assistive devices as appropriate  - Consider OT/PT consult to assist with strengthening/mobility  - Encourage toileting schedule  Outcome: Progressing     Problem: DISCHARGE PLANNING  Goal: Discharge to home or other facility with appropriate resources  Description: INTERVENTIONS:  - Identify barriers to discharge w/pt and caregiver  - Include patient/family/discharge partner in discharge planning  - Arrange for needed discharge resources and transportation as appropriate  - Identify discharge learning needs (meds, wound care, etc)  - Arrange for interpreters to assist at discharge as needed  - Consider post-discharge preferences of patient/family/discharge partner  - Complete POLST form as appropriate  - Assess patient's ability to be responsible for managing their own health  - Refer to Case Management Department for coordinating discharge planning if the patient needs post-hospital services based on physician/LIP order or complex needs related to functional status, cognitive ability or social support system  Outcome: Progressing     Problem: Altered Communication/Language Barrier  Goal: Patient/Family is able to understand and participate in their care  Description: Interventions:  - Assess communication ability and preferred communication style  - Implement communication aides and strategies  - Use visual cues when possible  - Listen attentively, be patient, do not interrupt  - Minimize distractions  - Allow time for understanding and response  - Establish method for patient to ask for assistance (call light)  - Provide an  as needed  - Communicate barriers and strategies to overcome with those who interact with patient  Outcome: Progressing

## 2022-09-30 ENCOUNTER — APPOINTMENT (OUTPATIENT)
Dept: ULTRASOUND IMAGING | Facility: HOSPITAL | Age: 57
End: 2022-09-30
Attending: HOSPITALIST
Payer: COMMERCIAL

## 2022-09-30 LAB
GLUCOSE BLDC GLUCOMTR-MCNC: 179 MG/DL (ref 70–99)
GLUCOSE BLDC GLUCOMTR-MCNC: 188 MG/DL (ref 70–99)
GLUCOSE BLDC GLUCOMTR-MCNC: 212 MG/DL (ref 70–99)
GLUCOSE BLDC GLUCOMTR-MCNC: 220 MG/DL (ref 70–99)
GLUCOSE BLDC GLUCOMTR-MCNC: 306 MG/DL (ref 70–99)

## 2022-09-30 PROCEDURE — 99233 SBSQ HOSP IP/OBS HIGH 50: CPT | Performed by: HOSPITALIST

## 2022-09-30 PROCEDURE — 93971 EXTREMITY STUDY: CPT | Performed by: HOSPITALIST

## 2022-09-30 RX ORDER — GABAPENTIN 300 MG/1
300 CAPSULE ORAL 3 TIMES DAILY
Status: DISCONTINUED | OUTPATIENT
Start: 2022-09-30 | End: 2022-10-01

## 2022-09-30 RX ORDER — ASPIRIN 81 MG/1
81 TABLET, CHEWABLE ORAL DAILY
Status: DISCONTINUED | OUTPATIENT
Start: 2022-09-30 | End: 2022-10-01

## 2022-09-30 RX ORDER — GABAPENTIN 300 MG/1
300 CAPSULE ORAL 3 TIMES DAILY
Qty: 90 CAPSULE | Refills: 1 | Status: SHIPPED | OUTPATIENT
Start: 2022-09-30 | End: 2022-10-30

## 2022-09-30 NOTE — SIGNIFICANT EVENT
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 I discussed this finding with Dr. Jericho Fall and Dr. Lanette Bear. Per Dr. Jericho Fall, IVC filter is not indicated for this clot as it is below knee and a communicating vein. Recommends repeat ultrasound in 2-3 dys. Dr. Lanette Bear says ok to start pt on baby aspirin daily starting today. I ordered this.     Shelly Rutledge MD  9/30/2022

## 2022-09-30 NOTE — PLAN OF CARE
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 Aspirin given. Alert and oriented. Denies SOB and chest pain. Pain being managed with Oxy IR and neurontin and Dilaudid and  ice and repositioning. Lumbar hemovac drain patent. SCDs. Up in [place] with walker. DC plan is home. Problem: Patient Centered Care  Goal: Patient preferences are identified and integrated in the patient's plan of care  Description: Interventions:  - What would you like us to know as we care for you?  *Planning to return home**  - Provide timely, complete, and accurate information to patient/family  - Incorporate patient and family knowledge, values, beliefs, and cultural backgrounds into the planning and delivery of care  - Encourage patient/family to participate in care and decision-making at the level they choose  - Honor patient and family perspectives and choices  Outcome: Progressing     Problem: Diabetes/Glucose Control  Goal: Glucose maintained within prescribed range  Description: INTERVENTIONS:  - Monitor Blood Glucose as ordered  - Assess for signs and symptoms of hyperglycemia and hypoglycemia  - Administer ordered medications to maintain glucose within target range  - Assess barriers to adequate nutritional intake and initiate nutrition consult as needed  - Instruct patient on self management of diabetes  Outcome: Progressing     Problem: Patient/Family Goals  Goal: Patient/Family Long Term Goal  Description: Patient's Long Term Goal: *Return home to recover with family**    Interventions:  - *therapy and SW for dc planning**  - See additional Care Plan goals for specific interventions  Outcome: Progressing  Goal: Patient/Family Short Term Goal  Description: Patient's Short Term Goal: *Pain <5/10**    Interventions:   - *Medicated per MD orders for pain**  - See additional Care Plan goals for specific interventions  Outcome: Progressing     Problem: PAIN - ADULT  Goal: Verbalizes/displays adequate comfort level or patient's stated pain goal  Description: INTERVENTIONS:  - Encourage pt to monitor pain and request assistance  - Assess pain using appropriate pain scale  - Administer analgesics based on type and severity of pain and evaluate response  - Implement non-pharmacological measures as appropriate and evaluate response  - Consider cultural and social influences on pain and pain management  - Manage/alleviate anxiety  - Utilize distraction and/or relaxation techniques  - Monitor for opioid side effects  - Notify MD/LIP if interventions unsuccessful or patient reports new pain  - Anticipate increased pain with activity and pre-medicate as appropriate  Outcome: Progressing     Problem: RISK FOR INFECTION - ADULT  Goal: Absence of fever/infection during anticipated neutropenic period  Description: INTERVENTIONS  - Monitor WBC  - Administer growth factors as ordered  - Implement neutropenic guidelines  Outcome: Progressing     Problem: SAFETY ADULT - FALL  Goal: Free from fall injury  Description: INTERVENTIONS:  - Assess pt frequently for physical needs  - Identify cognitive and physical deficits and behaviors that affect risk of falls.   - [place] fall precautions as indicated by assessment.  - Educate pt/family on patient safety including physical limitations  - Instruct pt to call for assistance with activity based on assessment  - Modify environment to reduce risk of injury  - Provide assistive devices as appropriate  - Consider OT/PT consult to assist with strengthening/mobility  - Encourage toileting schedule  Outcome: Progressing     Problem: DISCHARGE PLANNING  Goal: Discharge to home or other facility with appropriate resources  Description: INTERVENTIONS:  - Identify barriers to discharge w/pt and caregiver  - Include patient/family/discharge partner in discharge planning  - Arrange for needed discharge resources and transportation as appropriate  - Identify discharge learning needs (meds, wound care, etc)  - Arrange for interpreters to assist at discharge as needed  - Consider post-discharge preferences of patient/family/discharge partner  - Complete POLST form as appropriate  - Assess patient's ability to be responsible for managing their own health  - Refer to Case Management Department for coordinating discharge planning if the patient needs post-hospital services based on physician/LIP order or complex needs related to functional status, cognitive ability or social support system  Outcome: Progressing     Problem: Altered Communication/Language Barrier  Goal: Patient/Family is able to understand and participate in their care  Description: Interventions:  - Assess communication ability and preferred communication style  - Implement communication aides and strategies  - Use visual cues when possible  - Listen attentively, be patient, do not interrupt  - Minimize distractions  - Allow time for understanding and response  - Establish method for patient to ask for assistance (call light)  - Provide an  as needed  - Communicate barriers and strategies to overcome with those who interact with patient  Outcome: Progressing

## 2022-09-30 NOTE — PLAN OF CARE
POD 2. A&O x4. Voiding freely. Monitoring blood sugars per orders. Pain managed with oxy PRN. Hemovac drain in place. Up with 1 assist and walker. Family at bedside. Plan is to go home when medically cleared. Call light within reach, safety measures in place. Problem: Patient Centered Care  Goal: Patient preferences are identified and integrated in the patient's plan of care  Description: Interventions:  - What would you like us to know as we care for you?  Home with family   - Provide timely, complete, and accurate information to patient/family  - Incorporate patient and family knowledge, values, beliefs, and cultural backgrounds into the planning and delivery of care  - Encourage patient/family to participate in care and decision-making at the level they choose  - Honor patient and family perspectives and choices  Outcome: Progressing     Problem: Diabetes/Glucose Control  Goal: Glucose maintained within prescribed range  Description: INTERVENTIONS:  - Monitor Blood Glucose as ordered  - Assess for signs and symptoms of hyperglycemia and hypoglycemia  - Administer ordered medications to maintain glucose within target range  - Assess barriers to adequate nutritional intake and initiate nutrition consult as needed  - Instruct patient on self management of diabetes  Outcome: Progressing     Problem: Patient/Family Goals  Goal: Patient/Family Long Term Goal  Description: Patient's Long Term Goal: go home     Interventions:  - medication, ambulation, monitoring   - See additional Care Plan goals for specific interventions  Outcome: Progressing  Goal: Patient/Family Short Term Goal  Description: Patient's Short Term Goal: control pain    Interventions:   - rest, ice, medication   - See additional Care Plan goals for specific interventions  Outcome: Progressing     Problem: PAIN - ADULT  Goal: Verbalizes/displays adequate comfort level or patient's stated pain goal  Description: INTERVENTIONS:  - Encourage pt to monitor pain and request assistance  - Assess pain using appropriate pain scale  - Administer analgesics based on type and severity of pain and evaluate response  - Implement non-pharmacological measures as appropriate and evaluate response  - Consider cultural and social influences on pain and pain management  - Manage/alleviate anxiety  - Utilize distraction and/or relaxation techniques  - Monitor for opioid side effects  - Notify MD/LIP if interventions unsuccessful or patient reports new pain  - Anticipate increased pain with activity and pre-medicate as appropriate  Outcome: Progressing     Problem: RISK FOR INFECTION - ADULT  Goal: Absence of fever/infection during anticipated neutropenic period  Description: INTERVENTIONS  - Monitor WBC  - Administer growth factors as ordered  - Implement neutropenic guidelines  Outcome: Progressing     Problem: SAFETY ADULT - FALL  Goal: Free from fall injury  Description: INTERVENTIONS:  - Assess pt frequently for physical needs  - Identify cognitive and physical deficits and behaviors that affect risk of falls.   - [place] fall precautions as indicated by assessment.  - Educate pt/family on patient safety including physical limitations  - Instruct pt to call for assistance with activity based on assessment  - Modify environment to reduce risk of injury  - Provide assistive devices as appropriate  - Consider OT/PT consult to assist with strengthening/mobility  - Encourage toileting schedule  Outcome: Progressing     Problem: DISCHARGE PLANNING  Goal: Discharge to home or other facility with appropriate resources  Description: INTERVENTIONS:  - Identify barriers to discharge w/pt and caregiver  - Include patient/family/discharge partner in discharge planning  - Arrange for needed discharge resources and transportation as appropriate  - Identify discharge learning needs (meds, wound care, etc)  - Arrange for interpreters to assist at discharge as needed  - Consider post-discharge preferences of patient/family/discharge partner  - Complete POLST form as appropriate  - Assess patient's ability to be responsible for managing their own health  - Refer to Case Management Department for coordinating discharge planning if the patient needs post-hospital services based on physician/LIP order or complex needs related to functional status, cognitive ability or social support system  Outcome: Progressing     Problem: Altered Communication/Language Barrier  Goal: Patient/Family is able to understand and participate in their care  Description: Interventions:  - Assess communication ability and preferred communication style  - Implement communication aides and strategies  - Use visual cues when possible  - Listen attentively, be patient, do not interrupt  - Minimize distractions  - Allow time for understanding and response  - Establish method for patient to ask for assistance (call light)  - Provide an  as needed  - Communicate barriers and strategies to overcome with those who interact with patient  Outcome: Progressing

## 2022-10-01 VITALS
RESPIRATION RATE: 16 BRPM | SYSTOLIC BLOOD PRESSURE: 128 MMHG | WEIGHT: 193 LBS | HEART RATE: 77 BPM | TEMPERATURE: 98 F | DIASTOLIC BLOOD PRESSURE: 91 MMHG | BODY MASS INDEX: 27.63 KG/M2 | OXYGEN SATURATION: 98 % | HEIGHT: 70 IN

## 2022-10-01 LAB — GLUCOSE BLDC GLUCOMTR-MCNC: 228 MG/DL (ref 70–99)

## 2022-10-01 PROCEDURE — 99239 HOSP IP/OBS DSCHRG MGMT >30: CPT | Performed by: HOSPITALIST

## 2022-10-01 RX ORDER — GABAPENTIN 300 MG/1
300 CAPSULE ORAL 3 TIMES DAILY
Status: DISCONTINUED | OUTPATIENT
Start: 2022-10-01 | End: 2022-10-01

## 2022-10-01 RX ORDER — BISACODYL 10 MG
10 SUPPOSITORY, RECTAL RECTAL
Status: DISCONTINUED | OUTPATIENT
Start: 2022-10-01 | End: 2022-10-01

## 2022-10-01 RX ORDER — SENNOSIDES 8.6 MG
17.2 TABLET ORAL NIGHTLY PRN
Status: DISCONTINUED | OUTPATIENT
Start: 2022-10-01 | End: 2022-10-01

## 2022-10-01 RX ORDER — ASPIRIN 81 MG/1
81 TABLET, CHEWABLE ORAL DAILY
Qty: 30 TABLET | Refills: 0 | Status: SHIPPED | OUTPATIENT
Start: 2022-10-02

## 2022-10-01 RX ORDER — ACETAMINOPHEN 325 MG/1
650 TABLET ORAL EVERY 4 HOURS PRN
Status: DISCONTINUED | OUTPATIENT
Start: 2022-10-01 | End: 2022-10-01

## 2022-10-01 RX ORDER — LACTULOSE 10 G/15ML
30 SOLUTION ORAL 3 TIMES DAILY
Status: DISCONTINUED | OUTPATIENT
Start: 2022-10-01 | End: 2022-10-01

## 2022-10-01 RX ORDER — ACETAMINOPHEN AND CODEINE PHOSPHATE 300; 30 MG/1; MG/1
1 TABLET ORAL EVERY 4 HOURS PRN
Status: DISCONTINUED | OUTPATIENT
Start: 2022-10-01 | End: 2022-10-01

## 2022-10-01 RX ORDER — POLYETHYLENE GLYCOL 3350 17 G/17G
17 POWDER, FOR SOLUTION ORAL DAILY PRN
Status: DISCONTINUED | OUTPATIENT
Start: 2022-10-01 | End: 2022-10-01

## 2022-10-01 RX ORDER — SENNA AND DOCUSATE SODIUM 50; 8.6 MG/1; MG/1
1 TABLET, FILM COATED ORAL DAILY
Status: DISCONTINUED | OUTPATIENT
Start: 2022-10-01 | End: 2022-10-01

## 2022-10-01 RX ORDER — ACETAMINOPHEN AND CODEINE PHOSPHATE 300; 30 MG/1; MG/1
2 TABLET ORAL EVERY 4 HOURS PRN
Status: DISCONTINUED | OUTPATIENT
Start: 2022-10-01 | End: 2022-10-01

## 2022-10-01 NOTE — PLAN OF CARE
Problem: PAIN - ADULT  Goal: Verbalizes/displays adequate comfort level or patient's stated pain goal  Description: INTERVENTIONS:  - Encourage pt to monitor pain and request assistance  - Assess pain using appropriate pain scale  - Administer analgesics based on type and severity of pain and evaluate response  - Implement non-pharmacological measures as appropriate and evaluate response  - Consider cultural and social influences on pain and pain management  - Manage/alleviate anxiety  - Utilize distraction and/or relaxation techniques  - Monitor for opioid side effects  - Notify MD/LIP if interventions unsuccessful or patient reports new pain  - Anticipate increased pain with activity and pre-medicate as appropriate  Outcome: Adequate for Discharge     Problem: SAFETY ADULT - FALL  Goal: Free from fall injury  Description: INTERVENTIONS:  - Assess pt frequently for physical needs  - Identify cognitive and physical deficits and behaviors that affect risk of falls.   - [place] fall precautions as indicated by assessment.  - Educate pt/family on patient safety including physical limitations  - Instruct pt to call for assistance with activity based on assessment  - Modify environment to reduce risk of injury  - Provide assistive devices as appropriate  - Consider OT/PT consult to assist with strengthening/mobility  - Encourage toileting schedule  Outcome: Adequate for Discharge     Problem: DISCHARGE PLANNING  Goal: Discharge to home or other facility with appropriate resources  Description: INTERVENTIONS:  - Identify barriers to discharge w/pt and caregiver  - Include patient/family/discharge partner in discharge planning  - Arrange for needed discharge resources and transportation as appropriate  - Identify discharge learning needs (meds, wound care, etc)  - Arrange for interpreters to assist at discharge as needed  - Consider post-discharge preferences of patient/family/discharge partner  - Complete POLST form as appropriate  - Assess patient's ability to be responsible for managing their own health  - Refer to Case Management Department for coordinating discharge planning if the patient needs post-hospital services based on physician/LIP order or complex needs related to functional status, cognitive ability or social support system  Outcome: Adequate for Discharge   Hemovac removed and dressing changed by MD this am. Pain controlled with prescribed medication. Patient ambulates with a walker and stand by assist. Shawn Day provided for home use. Doppler US ordered for outpatient to be done before 10/4/22  Patient discharged home with wife.

## 2022-10-01 NOTE — PLAN OF CARE
POD 3. A&O x4. Thrombus in LLE, aspirin started. Voiding freely. Monitoring blood sugars per orders. Pain managed with oxy PRN. Hemovac drain in place. Up with stand by assist and walker. Family at bedside. Plan is to go home when medically cleared. Call light within reach, safety measures in place. Problem: Patient Centered Care  Goal: Patient preferences are identified and integrated in the patient's plan of care  Description: Interventions:  - What would you like us to know as we care for you?  Home with family   - Provide timely, complete, and accurate information to patient/family  - Incorporate patient and family knowledge, values, beliefs, and cultural backgrounds into the planning and delivery of care  - Encourage patient/family to participate in care and decision-making at the level they choose  - Honor patient and family perspectives and choices  Outcome: Progressing     Problem: Diabetes/Glucose Control  Goal: Glucose maintained within prescribed range  Description: INTERVENTIONS:  - Monitor Blood Glucose as ordered  - Assess for signs and symptoms of hyperglycemia and hypoglycemia  - Administer ordered medications to maintain glucose within target range  - Assess barriers to adequate nutritional intake and initiate nutrition consult as needed  - Instruct patient on self management of diabetes  Outcome: Progressing     Problem: Patient/Family Goals  Goal: Patient/Family Long Term Goal  Description: Patient's Long Term Goal: go home    Interventions:  - ambulation, medication, monitoring  - See additional Care Plan goals for specific interventions  Outcome: Progressing  Goal: Patient/Family Short Term Goal  Description: Patient's Short Term Goal: control pain    Interventions:   - rest, ice, medication   - See additional Care Plan goals for specific interventions  Outcome: Progressing     Problem: PAIN - ADULT  Goal: Verbalizes/displays adequate comfort level or patient's stated pain goal  Description: INTERVENTIONS:  - Encourage pt to monitor pain and request assistance  - Assess pain using appropriate pain scale  - Administer analgesics based on type and severity of pain and evaluate response  - Implement non-pharmacological measures as appropriate and evaluate response  - Consider cultural and social influences on pain and pain management  - Manage/alleviate anxiety  - Utilize distraction and/or relaxation techniques  - Monitor for opioid side effects  - Notify MD/LIP if interventions unsuccessful or patient reports new pain  - Anticipate increased pain with activity and pre-medicate as appropriate  Outcome: Progressing     Problem: RISK FOR INFECTION - ADULT  Goal: Absence of fever/infection during anticipated neutropenic period  Description: INTERVENTIONS  - Monitor WBC  - Administer growth factors as ordered  - Implement neutropenic guidelines  Outcome: Progressing     Problem: SAFETY ADULT - FALL  Goal: Free from fall injury  Description: INTERVENTIONS:  - Assess pt frequently for physical needs  - Identify cognitive and physical deficits and behaviors that affect risk of falls.   - [place] fall precautions as indicated by assessment.  - Educate pt/family on patient safety including physical limitations  - Instruct pt to call for assistance with activity based on assessment  - Modify environment to reduce risk of injury  - Provide assistive devices as appropriate  - Consider OT/PT consult to assist with strengthening/mobility  - Encourage toileting schedule  Outcome: Progressing     Problem: DISCHARGE PLANNING  Goal: Discharge to home or other facility with appropriate resources  Description: INTERVENTIONS:  - Identify barriers to discharge w/pt and caregiver  - Include patient/family/discharge partner in discharge planning  - Arrange for needed discharge resources and transportation as appropriate  - Identify discharge learning needs (meds, wound care, etc)  - Arrange for interpreters to assist at discharge as needed  - Consider post-discharge preferences of patient/family/discharge partner  - Complete POLST form as appropriate  - Assess patient's ability to be responsible for managing their own health  - Refer to Case Management Department for coordinating discharge planning if the patient needs post-hospital services based on physician/LIP order or complex needs related to functional status, cognitive ability or social support system  Outcome: Progressing     Problem: Altered Communication/Language Barrier  Goal: Patient/Family is able to understand and participate in their care  Description: Interventions:  - Assess communication ability and preferred communication style  - Implement communication aides and strategies  - Use visual cues when possible  - Listen attentively, be patient, do not interrupt  - Minimize distractions  - Allow time for understanding and response  - Establish method for patient to ask for assistance (call light)  - Provide an  as needed  - Communicate barriers and strategies to overcome with those who interact with patient  Outcome: Progressing

## 2022-10-03 ENCOUNTER — PATIENT MESSAGE (OUTPATIENT)
Dept: ENDOCRINOLOGY CLINIC | Facility: CLINIC | Age: 57
End: 2022-10-03

## 2022-10-03 ENCOUNTER — HOSPITAL ENCOUNTER (OUTPATIENT)
Dept: ULTRASOUND IMAGING | Facility: HOSPITAL | Age: 57
Discharge: HOME | End: 2022-10-03
Attending: HOSPITALIST
Payer: COMMERCIAL

## 2022-10-03 DIAGNOSIS — I82.4Z2 ACUTE DEEP VEIN THROMBOSIS OF LOWER LEG, LEFT (HCC): Primary | ICD-10-CM

## 2022-10-03 DIAGNOSIS — I82.4Z2 ACUTE DEEP VEIN THROMBOSIS OF LOWER LEG, LEFT (HCC): ICD-10-CM

## 2022-10-03 DIAGNOSIS — I82.4Z2 LOWER LEG DVT (DEEP VENOUS THROMBOEMBOLISM), ACUTE, LEFT (HCC): ICD-10-CM

## 2022-10-03 PROCEDURE — 93971 EXTREMITY STUDY: CPT | Performed by: HOSPITALIST

## 2022-10-03 RX ORDER — DULAGLUTIDE 1.5 MG/.5ML
1.5 INJECTION, SOLUTION SUBCUTANEOUS WEEKLY
Qty: 6 ML | Refills: 0 | Status: SHIPPED | OUTPATIENT
Start: 2022-10-03

## 2022-10-03 NOTE — TELEPHONE ENCOUNTER
From: Celia Geronimo  To:  Dixon Altamirano MD  Sent: 10/3/2022 10:59 AM CDT  Subject: Refill    Good morning govind I got the surgery sep 28 I ok but I don't have trulicity can you order more please tks

## 2022-10-04 ENCOUNTER — PATIENT MESSAGE (OUTPATIENT)
Dept: FAMILY MEDICINE CLINIC | Facility: CLINIC | Age: 57
End: 2022-10-04

## 2022-10-04 ENCOUNTER — PATIENT OUTREACH (OUTPATIENT)
Dept: CASE MANAGEMENT | Age: 57
End: 2022-10-04

## 2022-10-04 ENCOUNTER — OFFICE VISIT (OUTPATIENT)
Dept: FAMILY MEDICINE CLINIC | Facility: CLINIC | Age: 57
End: 2022-10-04
Payer: COMMERCIAL

## 2022-10-04 ENCOUNTER — HOSPITAL ENCOUNTER (OUTPATIENT)
Dept: GENERAL RADIOLOGY | Age: 57
Discharge: HOME | End: 2022-10-04
Attending: FAMILY MEDICINE
Payer: COMMERCIAL

## 2022-10-04 VITALS
BODY MASS INDEX: 27.2 KG/M2 | DIASTOLIC BLOOD PRESSURE: 71 MMHG | WEIGHT: 190 LBS | HEIGHT: 70 IN | TEMPERATURE: 98 F | SYSTOLIC BLOOD PRESSURE: 109 MMHG | HEART RATE: 81 BPM

## 2022-10-04 DIAGNOSIS — M79.671 RIGHT FOOT PAIN: ICD-10-CM

## 2022-10-04 DIAGNOSIS — M48.062 SPINAL STENOSIS OF LUMBAR REGION WITH NEUROGENIC CLAUDICATION: ICD-10-CM

## 2022-10-04 DIAGNOSIS — M79.671 RIGHT FOOT PAIN: Primary | ICD-10-CM

## 2022-10-04 DIAGNOSIS — Z02.9 ENCOUNTERS FOR UNSPECIFIED ADMINISTRATIVE PURPOSE: ICD-10-CM

## 2022-10-04 PROCEDURE — 3078F DIAST BP <80 MM HG: CPT | Performed by: FAMILY MEDICINE

## 2022-10-04 PROCEDURE — 1111F DSCHRG MED/CURRENT MED MERGE: CPT

## 2022-10-04 PROCEDURE — 99213 OFFICE O/P EST LOW 20 MIN: CPT | Performed by: FAMILY MEDICINE

## 2022-10-04 PROCEDURE — 3074F SYST BP LT 130 MM HG: CPT | Performed by: FAMILY MEDICINE

## 2022-10-04 PROCEDURE — 73630 X-RAY EXAM OF FOOT: CPT | Performed by: FAMILY MEDICINE

## 2022-10-04 PROCEDURE — 3008F BODY MASS INDEX DOCD: CPT | Performed by: FAMILY MEDICINE

## 2022-10-04 RX ORDER — COLCHICINE 0.6 MG/1
0.6 TABLET ORAL 4 TIMES DAILY
Qty: 60 TABLET | Refills: 0 | Status: SHIPPED | OUTPATIENT
Start: 2022-10-04

## 2022-10-07 ENCOUNTER — HOSPITAL ENCOUNTER (OUTPATIENT)
Dept: ULTRASOUND IMAGING | Facility: HOSPITAL | Age: 57
Discharge: HOME | End: 2022-10-07
Attending: NEUROLOGICAL SURGERY
Payer: COMMERCIAL

## 2022-10-07 DIAGNOSIS — I82.4Z3 DVT, LOWER EXTREMITY, DISTAL, ACUTE, BILATERAL (HCC): ICD-10-CM

## 2022-10-07 PROCEDURE — 93970 EXTREMITY STUDY: CPT | Performed by: NEUROLOGICAL SURGERY

## 2022-10-14 ENCOUNTER — HOSPITAL ENCOUNTER (OUTPATIENT)
Dept: ULTRASOUND IMAGING | Facility: HOSPITAL | Age: 57
Discharge: HOME | End: 2022-10-14
Payer: COMMERCIAL

## 2022-10-14 DIAGNOSIS — I82.402 ACUTE DEEP VEIN THROMBOSIS (DVT) OF LEFT LOWER EXTREMITY, UNSPECIFIED VEIN (HCC): ICD-10-CM

## 2022-10-14 PROCEDURE — 93971 EXTREMITY STUDY: CPT

## 2022-10-18 ENCOUNTER — ORDER TRANSCRIPTION (OUTPATIENT)
Dept: PHYSICAL THERAPY | Facility: HOSPITAL | Age: 57
End: 2022-10-18

## 2022-10-18 DIAGNOSIS — M48.061 SPINAL STENOSIS, LUMBAR REGION, WITHOUT NEUROGENIC CLAUDICATION: Primary | ICD-10-CM

## 2022-10-18 DIAGNOSIS — Z98.890 S/P LUMBAR LAMINECTOMY: ICD-10-CM

## 2022-10-20 ENCOUNTER — TELEPHONE (OUTPATIENT)
Dept: PHYSICAL THERAPY | Facility: HOSPITAL | Age: 57
End: 2022-10-20

## 2022-10-20 ENCOUNTER — OFFICE VISIT (OUTPATIENT)
Dept: PHYSICAL THERAPY | Age: 57
End: 2022-10-20
Attending: PHYSICIAN ASSISTANT
Payer: COMMERCIAL

## 2022-10-20 DIAGNOSIS — Z98.890 S/P LUMBAR LAMINECTOMY: ICD-10-CM

## 2022-10-20 DIAGNOSIS — M48.061 SPINAL STENOSIS, LUMBAR REGION, WITHOUT NEUROGENIC CLAUDICATION: ICD-10-CM

## 2022-10-20 PROCEDURE — 97110 THERAPEUTIC EXERCISES: CPT

## 2022-10-20 PROCEDURE — 97161 PT EVAL LOW COMPLEX 20 MIN: CPT

## 2022-10-25 ENCOUNTER — APPOINTMENT (OUTPATIENT)
Dept: PHYSICAL THERAPY | Age: 57
End: 2022-10-25
Attending: PHYSICIAN ASSISTANT
Payer: COMMERCIAL

## 2022-10-31 ENCOUNTER — OFFICE VISIT (OUTPATIENT)
Dept: PHYSICAL THERAPY | Age: 57
End: 2022-10-31
Attending: PHYSICIAN ASSISTANT
Payer: COMMERCIAL

## 2022-10-31 DIAGNOSIS — M48.061 SPINAL STENOSIS, LUMBAR REGION, WITHOUT NEUROGENIC CLAUDICATION: ICD-10-CM

## 2022-10-31 DIAGNOSIS — Z98.890 S/P LUMBAR LAMINECTOMY: ICD-10-CM

## 2022-10-31 PROCEDURE — 97110 THERAPEUTIC EXERCISES: CPT

## 2022-10-31 NOTE — PROGRESS NOTES
Diagnosis:  Spinal stenosis, lumbar region, without neurogenic claudication (M48.061)  S/P lumbar laminectomy (T92.552)         Next MD visit: none scheduled  Fall Risk: standard         Precautions: n/a          Medication Changes since last visit?: No    Subjective: Pt reports no low back pain today. He reports his back has been doing better. Objective:     Date: 10/31/2022  Visit #: 2/12 O (exp. 12/31/22)   HEP   -    Therapeutic Exercise   - supine R/L LTR 10x ea 5 sec holds  - supine R/L TA/PPT contraction 1 x 10 ea   - supine bridges with GTB above knees 1 x 10  - supine iso ab push with hands on knees 5 second holds with legs resting on SB 10x  - sidelying R/L clams with GTB above knees 1 x 10 ea 5 sec holds  - supine R/L hamstring stretch with strap 1 x 5 ea 5-10 sec holds  - prone lying > SYLWIA 10x  - standing B gastroc stretch on slant board level 4 2 x 30 sec holds  - standing squats 10x  - standing lumbar extension 10x   Manual Therapy   -    Therapeutic Activity   -    Modalities                Assessment:  Session focused on core and hip strengthening interventions.      Plan: continue PT    Charges: Ex 3      Total Timed Treatment: 45 min  Total Treatment Time: 45  min

## 2022-11-03 ENCOUNTER — OFFICE VISIT (OUTPATIENT)
Dept: PHYSICAL THERAPY | Age: 57
End: 2022-11-03
Attending: PHYSICIAN ASSISTANT
Payer: COMMERCIAL

## 2022-11-03 DIAGNOSIS — Z98.890 S/P LUMBAR LAMINECTOMY: ICD-10-CM

## 2022-11-03 DIAGNOSIS — M48.061 SPINAL STENOSIS, LUMBAR REGION, WITHOUT NEUROGENIC CLAUDICATION: ICD-10-CM

## 2022-11-03 PROCEDURE — 97110 THERAPEUTIC EXERCISES: CPT

## 2022-11-03 NOTE — PROGRESS NOTES
Diagnosis:  Spinal stenosis, lumbar region, without neurogenic claudication (M48.061)  S/P lumbar laminectomy (T12.660)         Next MD visit: none scheduled  Fall Risk: standard         Precautions: n/a          Medication Changes since last visit?: No    Subjective: Pt reports no low back pain today. He reports he continues to walk regularly. Objective:     Date: 11/3/2022  Visit #: 3/12 [facility] (exp. 12/31/22) Date: 10/31/2022  Visit #: 2/12 [facility] (exp. 12/31/22)   HEP       Therapeutic Exercise   - standing B gastroc stretch on slant board level 4 3 x 30 sec holds  - standing squats with GTB above knees 2 x 10  - standing R/L hip abduction/hip extension with GTB at ankles 2 x 10 ea  - standing lumbar extension 10x  - dead bug with SB 1 x 5 ea  - supine R/L hamstring stretch with strap 1 x 10 ea 5-10 sec holds  - hooklying R/L hip abduction/hip ER with Blue Tband above knees 2 x 10 ea 5 sec holds  - hooklying TA/PPT contraction with R/L heel slides 1 x 10 ea  - supine bridges with GTB above knees 2 x 10  - prone lying > SYLWIA 10x   - supine R/L LTR 10x ea 5 sec holds  - hooklying TA/PPT contraction with R/L heel slides 1 x 10 ea  - supine bridges with GTB above knees 1 x 10  - supine iso ab push with hands on knees 5 second holds with legs resting on SB 10x  - sidelying R/L clams with GTB above knees 1 x 10 ea 5 sec holds  - supine R/L hamstring stretch with strap 1 x 5 ea 5-10 sec holds  - prone lying > SYLWIA 10x  - standing B gastroc stretch on slant board level 4 2 x 30 sec holds  - standing squats 10x  - standing lumbar extension 10x   Manual Therapy       Therapeutic Activity       Modalities                  Assessment:  Initiated standing hip strengthening interventions and continued with gentle lumbar and LE stretching.      Plan: continue PT    Charges: Ex 3      Total Timed Treatment: 50 min  Total Treatment Time: 50  min

## 2022-11-08 ENCOUNTER — APPOINTMENT (OUTPATIENT)
Dept: PHYSICAL THERAPY | Age: 57
End: 2022-11-08
Attending: PHYSICIAN ASSISTANT
Payer: COMMERCIAL

## 2022-11-08 ENCOUNTER — PATIENT MESSAGE (OUTPATIENT)
Dept: FAMILY MEDICINE CLINIC | Facility: CLINIC | Age: 57
End: 2022-11-08

## 2022-11-08 ENCOUNTER — HOSPITAL ENCOUNTER (OUTPATIENT)
Age: 57
Discharge: HOME | End: 2022-11-08
Payer: COMMERCIAL

## 2022-11-08 VITALS
OXYGEN SATURATION: 97 % | BODY MASS INDEX: 25.77 KG/M2 | SYSTOLIC BLOOD PRESSURE: 112 MMHG | HEART RATE: 82 BPM | RESPIRATION RATE: 18 BRPM | TEMPERATURE: 98 F | HEIGHT: 70 IN | DIASTOLIC BLOOD PRESSURE: 73 MMHG | WEIGHT: 180 LBS

## 2022-11-08 DIAGNOSIS — R05.1 ACUTE COUGH: Primary | ICD-10-CM

## 2022-11-08 DIAGNOSIS — U07.1 COVID-19: ICD-10-CM

## 2022-11-08 LAB — SARS-COV-2 RNA RESP QL NAA+PROBE: DETECTED

## 2022-11-08 PROCEDURE — U0002 COVID-19 LAB TEST NON-CDC: HCPCS

## 2022-11-08 PROCEDURE — 99213 OFFICE O/P EST LOW 20 MIN: CPT | Performed by: PHYSICIAN ASSISTANT

## 2022-11-09 ENCOUNTER — TELEPHONE (OUTPATIENT)
Dept: FAMILY MEDICINE CLINIC | Facility: CLINIC | Age: 57
End: 2022-11-09

## 2022-11-09 ENCOUNTER — PATIENT MESSAGE (OUTPATIENT)
Dept: FAMILY MEDICINE CLINIC | Facility: CLINIC | Age: 57
End: 2022-11-09

## 2022-11-09 NOTE — TELEPHONE ENCOUNTER
See condition  update encounter 11/9/22.     Gabbi Chamorro RN 11/9/2022  8:11 AM CST        ----- Message -----  From: Yoly Marie  Sent: 11/8/2022   6:20 PM CST  To: Em Rn Triage  Subject: Covid 23                                         Buenas tardes  doctor tengo el [id number]  desde rahul tengo flu,fever cough and dolor de huesos ocupo medicina que me pueda ordinary at [employer] se lo agradescoen my chart esta la information  de [employer]

## 2022-11-09 NOTE — TELEPHONE ENCOUNTER
Went to Urgent care yesterday 11/8/22, positive COVID.       ----- Message from Teofilo Henderson RN sent at 11/9/2022  8:11 AM CST -----  Regarding: FW: Covid 19      ----- Message -----  From: Katharine Turner  Sent: 11/8/2022   6:20 PM CST  To: Em Rn Triage  Subject: Covid 23                                         Buenas tarkatina  doctor tengo el [id number]  desde rahul tengo flu,fever cough and dolor de huesos ocupo medicina que me pueda ordinary at [employer] se lo agradescoen my chart esta la information  de [employer]

## 2022-11-09 NOTE — TELEPHONE ENCOUNTER
Dr. Leana Hastings, patient tested positive for Covid on 11/7/22. Taking Tylenol which is helping with fevers and muscles aches. Patient states his cough is not allowing to sleep well. Patient requesting medications for cough as well as Paxlovid. Patient was educated on updated isolation and quarantine recommendations as well as at home care for symptoms and red flags that would be indicators to report to ER. Patient verbalized understanding. Please advise. Thank you.

## 2022-11-10 RX ORDER — NIRMATRELVIR AND RITONAVIR 300-100 MG
KIT ORAL
Qty: 30 TABLET | Refills: 0 | Status: SHIPPED | OUTPATIENT
Start: 2022-11-10 | End: 2022-11-14

## 2022-11-10 NOTE — TELEPHONE ENCOUNTER
Dr. Norbert Bejarano, patient called again. Requesting paxlovid as well as cough medicine per previous note.

## 2022-11-10 NOTE — TELEPHONE ENCOUNTER
Jerzy Patel RN 11/10/2022 8:48 AM CST        ----- Message -----  From: Niru Quiroga  Sent: 11/9/2022 7:50 PM CST  To: Em Rn Triage  Subject: [id number]     Are you order any medicine for me today?  Tks

## 2022-11-15 ENCOUNTER — APPOINTMENT (OUTPATIENT)
Dept: PHYSICAL THERAPY | Age: 57
End: 2022-11-15
Attending: PHYSICIAN ASSISTANT
Payer: COMMERCIAL

## 2022-11-15 ENCOUNTER — TELEPHONE (OUTPATIENT)
Dept: PHYSICAL THERAPY | Facility: HOSPITAL | Age: 57
End: 2022-11-15

## 2022-11-17 ENCOUNTER — APPOINTMENT (OUTPATIENT)
Dept: PHYSICAL THERAPY | Age: 57
End: 2022-11-17
Attending: PHYSICIAN ASSISTANT
Payer: COMMERCIAL

## 2022-11-23 ENCOUNTER — OFFICE VISIT (OUTPATIENT)
Dept: PHYSICAL THERAPY | Age: 57
End: 2022-11-23
Attending: PHYSICIAN ASSISTANT
Payer: COMMERCIAL

## 2022-11-23 DIAGNOSIS — Z98.890 S/P LUMBAR LAMINECTOMY: ICD-10-CM

## 2022-11-23 DIAGNOSIS — M48.061 SPINAL STENOSIS, LUMBAR REGION, WITHOUT NEUROGENIC CLAUDICATION: ICD-10-CM

## 2022-11-23 PROCEDURE — 97110 THERAPEUTIC EXERCISES: CPT

## 2022-11-23 NOTE — PROGRESS NOTES
Diagnosis:  Spinal stenosis, lumbar region, without neurogenic claudication (M48.061)  S/P lumbar laminectomy (O59.339)         Next MD visit: none scheduled  Fall Risk: standard         Precautions: n/a          Medication Changes since last visit?: No    Subjective: Pt reports 7/10 low back pain today. He states he had COVID and so had to cancel his PT appointments.       Objective:     Date: 11/23/2022  Visit #: 4/12 [facility] (exp. 12/31/22) Date: 11/3/2022  Visit #: 3/12 HMO (exp. 12/31/22) Date: 10/31/2022  Visit #: 2/12 [facility] (exp. 12/31/22)   HEP        Therapeutic Exercise   - standing B gastroc stretch on slant board level 4 3 x 30 sec holds  - standing lumbar extension 10x  - dead bug with SB 2 x 10  - supine R/L hamstring stretch with strap 1 x 10 ea 5-10 sec holds  - prone lying > SYLWAI 10x  - supine R/L LTR 10x ea  - supine SB bridges 2 x 10  - supine R/L SKTC 10x ea  - shuttle machine B knee ext/flx 7 bands 2 x 10  - shuttle machine R/L knee ext/flx 5 bands 2 x 10 ea  - R/L forward step up to 6 inch step with 1UE support 1 x 10 ea - standing B gastroc stretch on slant board level 4 3 x 30 sec holds  - standing squats with GTB above knees 2 x 10  - standing R/L hip abduction/hip extension with GTB at ankles 2 x 10 ea  - standing lumbar extension 10x  - dead bug with SB 1 x 5 ea  - supine R/L hamstring stretch with strap 1 x 10 ea 5-10 sec holds  - hooklying R/L hip abduction/hip ER with Blue Tband above knees 2 x 10 ea 5 sec holds  - hooklying TA/PPT contraction with R/L heel slides 1 x 10 ea  - supine bridges with GTB above knees 2 x 10  - prone lying > SYLWIA 10x   - supine R/L LTR 10x ea 5 sec holds  - hooklying TA/PPT contraction with R/L heel slides 1 x 10 ea  - supine bridges with GTB above knees 1 x 10  - supine iso ab push with hands on knees 5 second holds with legs resting on SB 10x  - sidelying R/L clams with GTB above knees 1 x 10 ea 5 sec holds  - supine R/L hamstring stretch with strap 1 x 5 ea 5-10 sec holds  - prone lying > SYLWIA 10x  - standing B gastroc stretch on slant board level 4 2 x 30 sec holds  - standing squats 10x  - standing lumbar extension 10x   Manual Therapy        Therapeutic Activity        Modalities                    Assessment:  Session focused on core and hip strengthening. Initiated shuttle machine today to advance global LE strength.      Plan: continue PT    Charges: Ex 3      Total Timed Treatment: 50 min  Total Treatment Time: 50  min

## 2022-11-25 ENCOUNTER — APPOINTMENT (OUTPATIENT)
Dept: PHYSICAL THERAPY | Age: 57
End: 2022-11-25
Attending: PHYSICIAN ASSISTANT
Payer: COMMERCIAL

## 2022-11-25 ENCOUNTER — OFFICE VISIT (OUTPATIENT)
Dept: FAMILY MEDICINE CLINIC | Facility: CLINIC | Age: 57
End: 2022-11-25
Payer: COMMERCIAL

## 2022-11-25 VITALS
HEART RATE: 69 BPM | OXYGEN SATURATION: 100 % | DIASTOLIC BLOOD PRESSURE: 73 MMHG | BODY MASS INDEX: 28.2 KG/M2 | WEIGHT: 197 LBS | HEIGHT: 70 IN | SYSTOLIC BLOOD PRESSURE: 115 MMHG

## 2022-11-25 DIAGNOSIS — E11.9 DIABETES MELLITUS TYPE 2 IN NONOBESE (HCC): Primary | ICD-10-CM

## 2022-11-25 PROBLEM — J44.9 CHRONIC OBSTRUCTIVE PULMONARY DISEASE, UNSPECIFIED (HCC): Status: ACTIVE | Noted: 2022-11-25

## 2022-11-25 PROCEDURE — 3078F DIAST BP <80 MM HG: CPT | Performed by: FAMILY MEDICINE

## 2022-11-25 PROCEDURE — 3074F SYST BP LT 130 MM HG: CPT | Performed by: FAMILY MEDICINE

## 2022-11-25 PROCEDURE — 3008F BODY MASS INDEX DOCD: CPT | Performed by: FAMILY MEDICINE

## 2022-11-25 PROCEDURE — 99213 OFFICE O/P EST LOW 20 MIN: CPT | Performed by: FAMILY MEDICINE

## 2022-11-25 RX ORDER — FLUTICASONE PROPIONATE AND SALMETEROL XINAFOATE 230; 21 UG/1; UG/1
1 AEROSOL, METERED RESPIRATORY (INHALATION) 2 TIMES DAILY
Qty: 1 EACH | Refills: 1 | Status: SHIPPED | OUTPATIENT
Start: 2022-11-25

## 2022-11-25 RX ORDER — TERBINAFINE HYDROCHLORIDE 250 MG/1
250 TABLET ORAL DAILY
Qty: 90 TABLET | Refills: 0 | Status: SHIPPED | OUTPATIENT
Start: 2022-11-25 | End: 2023-02-23

## 2022-11-25 NOTE — PROGRESS NOTES
Blood pressure 115/73, pulse 69, height 5' 10\" (1.778 m), weight 197 lb (89.4 kg), SpO2 100 %. Presents today complaining of 3 weeks of persistent cough ever since he had COVID. Some watery itchy eyes using Coricidin with minimal to no relief. Occasional heartburn he has had a dog for a year. Denies nocturnal cough no phlegm production no fever no chills no dyspnea. He does not smoke. Denies asthma.   No nasal congestion or sneezing    Objective    Lungs clear to auscultation no rales rhonchi or wheezes    Throat clear not erythematous    Assessment post viral versus infectious cough that persists    Plan Advair risks and benefits explained    Also fasting blood test in 2 months for diabetes

## 2022-11-29 ENCOUNTER — OFFICE VISIT (OUTPATIENT)
Dept: PHYSICAL THERAPY | Age: 57
End: 2022-11-29
Attending: PHYSICIAN ASSISTANT
Payer: COMMERCIAL

## 2022-11-29 DIAGNOSIS — Z98.890 S/P LUMBAR LAMINECTOMY: ICD-10-CM

## 2022-11-29 DIAGNOSIS — M48.061 SPINAL STENOSIS, LUMBAR REGION, WITHOUT NEUROGENIC CLAUDICATION: ICD-10-CM

## 2022-11-29 PROCEDURE — 97110 THERAPEUTIC EXERCISES: CPT

## 2022-11-29 NOTE — PROGRESS NOTES
Diagnosis:  Spinal stenosis, lumbar region, without neurogenic claudication (M48.061)  S/P lumbar laminectomy (M28.905)         Next MD visit: none scheduled  Fall Risk: standard         Precautions: n/a          Medication Changes since last visit?: No    Subjective: Pt reports his back is doing better. He believes the shuttle machine leg press last session may have helped his back last session.        Objective:     Date: 11/29/2022  Visit #: 5/12 [facility] (exp. 12/31/22) Date: 11/23/2022  Visit #: 4/12 [facility]MO (exp. 12/31/22) Date: 11/3/2022  Visit #: 3/12 [facility] (exp. 12/31/22)   HEP        Therapeutic Exercise   - standing B gastroc stretch on slant board level 4 3 x 30 sec holds  - standing lumbar extension 2 x 10  - dead bug with SB 2 x 10  - legs in table top with OH reach with 8# DB 1 x 10  - supine R/L hamstring stretch with strap 1 x 10 ea 5-10 sec holds  - prone on elbows 2 minutes  - supine R/L SKTC 10x ea  - shuttle machine B knee ext/flx 7 bands 2 x 10  - shuttle machine R/L knee ext/flx 5 bands 2 x 10 ea  - multifidus walkouts with GSC 10x ea  - standing R/L hip abduction/hip extension with GTB at ankles 2 x 10 ea - standing B gastroc stretch on slant board level 4 3 x 30 sec holds  - standing lumbar extension 10x  - dead bug with SB 2 x 10  - supine R/L hamstring stretch with strap 1 x 10 ea 5-10 sec holds  - prone lying > SYLWIA 10x  - supine R/L LTR 10x ea  - supine SB bridges 2 x 10  - supine R/L SKTC 10x ea  - shuttle machine B knee ext/flx 7 bands 2 x 10  - shuttle machine R/L knee ext/flx 5 bands 2 x 10 ea  - R/L forward step up to 6 inch step with 1UE support 1 x 10 ea - standing B gastroc stretch on slant board level 4 3 x 30 sec holds  - standing squats with GTB above knees 2 x 10  - standing R/L hip abduction/hip extension with GTB at ankles 2 x 10 ea  - standing lumbar extension 10x  - dead bug with SB 1 x 5 ea  - supine R/L hamstring stretch with strap 1 x 10 ea 5-10 sec holds  - hooklying R/L hip abduction/hip ER with Blue Tband above knees 2 x 10 ea 5 sec holds  - hooklying TA/PPT contraction with R/L heel slides 1 x 10 ea  - supine bridges with GTB above knees 2 x 10  - prone lying > SYLWIA 10x     Manual Therapy        Therapeutic Activity        Modalities                    Assessment:  Initiated multifidus walkouts today to promote lumbar and core stability.     Plan: continue PT    Charges: Ex 3      Total Timed Treatment: 50 min  Total Treatment Time: 50  min

## 2022-12-01 ENCOUNTER — PATIENT MESSAGE (OUTPATIENT)
Dept: FAMILY MEDICINE CLINIC | Facility: CLINIC | Age: 57
End: 2022-12-01

## 2022-12-01 DIAGNOSIS — R05.8 OTHER COUGH: Primary | ICD-10-CM

## 2022-12-02 ENCOUNTER — OFFICE VISIT (OUTPATIENT)
Dept: PHYSICAL THERAPY | Age: 57
End: 2022-12-02
Attending: PHYSICIAN ASSISTANT
Payer: COMMERCIAL

## 2022-12-02 DIAGNOSIS — Z98.890 S/P LUMBAR LAMINECTOMY: ICD-10-CM

## 2022-12-02 DIAGNOSIS — M48.061 SPINAL STENOSIS, LUMBAR REGION, WITHOUT NEUROGENIC CLAUDICATION: ICD-10-CM

## 2022-12-02 PROCEDURE — 97110 THERAPEUTIC EXERCISES: CPT

## 2022-12-02 RX ORDER — ALBUTEROL SULFATE 2.5 MG/3ML
2.5 SOLUTION RESPIRATORY (INHALATION) EVERY 4 HOURS PRN
Qty: 100 EACH | Refills: 5 | Status: SHIPPED | OUTPATIENT
Start: 2022-12-02

## 2022-12-02 NOTE — TELEPHONE ENCOUNTER
Dr. Andres Shay, please see patient's MyChart message below and advise on alternative treatment of persistent cough with nebulizer. Thank you. Per [place] Island[place] to [address]. translation via [employer]:  \"I am using it but the doctor told me to tell him if the inhaler helps me and it does not help me, he told me that he was going to order the machine\"  \"Yes, I am using the inhaler but it does not help me, the doctor told me that he was going to order the machine to speak to me\"

## 2022-12-02 NOTE — PROGRESS NOTES
Diagnosis:  Spinal stenosis, lumbar region, without neurogenic claudication (M48.061)  S/P lumbar laminectomy (Y06.586)         Next MD visit: December 13, 2022  Fall Risk: standard         Precautions: n/a          Medication Changes since last visit?: No    Subjective: Patient reports his back is doing well. He reports feeling a little sore after last session, but in a good way.      Objective:     Date: 12/2/2022  Visit #: 6/12 HMO (exp. 12/31/22) Date: 11/29/2022  Visit #: 5/12 HMO (exp. 12/31/22) Date: 11/23/2022  Visit #: 4/12 HMO (exp. 12/31/22)   HEP        Therapeutic Exercise   - standing lumbar extension 2 x 10  - standing squats 1 x 10  - standing squats with 8# DB at shoulders 1 x 10  - legs in table top with OH reach with 8# DB 2 x 10  - supine R/L hamstring stretch with strap 2 x 30 sec holds  - prone on elbows 2 minutes  - supine bridges with blue T-band above knees 2 x 10   - sidelying R/L clams with Blue Tband 2 x 10 ea  - shuttle machine B knee ext/flx 7 bands 3 x 10  - shuttle machine R/L knee ext/flx 5 bands 2 x 10 ea  - multifidus walkouts with GSC 10x ea - not today  - standing R/L hip abduction/hip extension with GTB at ankles 2 x 10 ea - standing B gastroc stretch on slant board level 4 3 x 30 sec holds  - standing lumbar extension 2 x 10  - dead bug with SB 2 x 10  - legs in table top with OH reach with 8# DB 1 x 10  - supine R/L hamstring stretch with strap 1 x 10 ea 5-10 sec holds  - prone on elbows 2 minutes  - supine R/L SKTC 10x ea  - shuttle machine B knee ext/flx 7 bands 2 x 10  - shuttle machine R/L knee ext/flx 5 bands 2 x 10 ea  - multifidus walkouts with GSC 10x ea  - standing R/L hip abduction/hip extension with GTB at ankles 2 x 10 ea - standing B gastroc stretch on slant board level 4 3 x 30 sec holds  - standing lumbar extension 10x  - dead bug with SB 2 x 10  - supine R/L hamstring stretch with strap 1 x 10 ea 5-10 sec holds  - prone lying > SYLWIA 10x  - supine R/L LTR 10x ea  - supine SB bridges 2 x 10  - supine R/L SKTC 10x ea  - shuttle machine B knee ext/flx 7 bands 2 x 10  - shuttle machine R/L knee ext/flx 5 bands 2 x 10 ea  - R/L forward step up to 6 inch step with 1UE support 1 x 10 ea   Manual Therapy        Therapeutic Activity        Modalities                    Assessment: Advanced strengthening interventions with initiation of squats and increased resistance of various therapeutic exercises.      Plan: continue PT    Charges: Ex 3      Total Timed Treatment:45 min  Total Treatment Time: 45  min

## 2022-12-05 ENCOUNTER — OFFICE VISIT (OUTPATIENT)
Dept: PHYSICAL THERAPY | Age: 57
End: 2022-12-05
Attending: PHYSICIAN ASSISTANT
Payer: COMMERCIAL

## 2022-12-05 DIAGNOSIS — Z98.890 S/P LUMBAR LAMINECTOMY: ICD-10-CM

## 2022-12-05 DIAGNOSIS — M48.061 SPINAL STENOSIS, LUMBAR REGION, WITHOUT NEUROGENIC CLAUDICATION: ICD-10-CM

## 2022-12-05 PROCEDURE — 97110 THERAPEUTIC EXERCISES: CPT

## 2022-12-05 NOTE — PROGRESS NOTES
Diagnosis:  Spinal stenosis, lumbar region, without neurogenic claudication (M48.061)  S/P lumbar laminectomy (C62.266)         Next MD visit: December 13, 2022  Fall Risk: standard         Precautions: n/a          Medication Changes since last visit?: No    Subjective: Patient reports his back is doing well. He reports slight right low back muscle soreness today; states not pain.      Objective:     Date: 12/5/2022  Visit #: 7/12 HMO (exp. 12/31/22) Date: 12/2/2022  Visit #: 6/12 HMO (exp. 12/31/22) Date: 11/29/2022  Visit #: 5/12 HMO (exp. 12/31/22)   HEP        Therapeutic Exercise   - standing lumbar extension 2 x 10  - standing squats 1 x 10  - standing squats with 8# DB at shoulders 1 x 10  - dead bug with legs in table top with OH reach with 8# DB 2 x 10  - supine R/L hamstring stretch with strap 2 x 30 sec holds  - prone on elbows 2 minutes  - supine bridges with blue T-band above knees 3 x 10   - R/L modified side plank 1 x 10 ea  - sidelying R/L hip abduction with 2.5# at ankles 2 x 10 ea  - standing R/L hip abduction/hip extension with BlueTB at ankles 3 x 10 ea  - standing R/L forward step up to 8 inch step 1 x 10 ea  - standing R/L trunk rotation with GSC 1 x 10 ea  - standing B gastroc stretch on slant board level 4-5 3 x 30 sec holds - standing lumbar extension 2 x 10  - standing squats 1 x 10  - standing squats with 8# DB at shoulders 1 x 10  - legs in table top with OH reach with 8# DB 2 x 10  - supine R/L hamstring stretch with strap 2 x 30 sec holds  - prone on elbows 2 minutes  - supine bridges with blue T-band above knees 2 x 10   - sidelying R/L clams with Blue Tband 2 x 10 ea  - shuttle machine B knee ext/flx 7 bands 3 x 10  - shuttle machine R/L knee ext/flx 5 bands 2 x 10 ea  - multifidus walkouts with GSC 10x ea - not today  - standing R/L hip abduction/hip extension with GTB at ankles 2 x 10 ea - standing B gastroc stretch on slant board level 4 3 x 30 sec holds  - standing lumbar extension 2 x 10  - dead bug with SB 2 x 10  - legs in table top with OH reach with 8# DB 1 x 10  - supine R/L hamstring stretch with strap 1 x 10 ea 5-10 sec holds  - prone on elbows 2 minutes  - supine R/L SKTC 10x ea  - shuttle machine B knee ext/flx 7 bands 2 x 10  - shuttle machine R/L knee ext/flx 5 bands 2 x 10 ea  - multifidus walkouts with GSC 10x ea  - standing R/L hip abduction/hip extension with GTB at ankles 2 x 10 ea   Manual Therapy        Therapeutic Activity        Modalities                    Assessment: Initiated trunk rotation exercise with Tband and side planks to continue to progress core strength.      Plan: continue PT    Charges: Ex 4      Total Timed Treatment: 54 min  Total Treatment Time:  54  min

## 2022-12-12 ENCOUNTER — OFFICE VISIT (OUTPATIENT)
Dept: PHYSICAL THERAPY | Age: 57
End: 2022-12-12
Attending: PHYSICIAN ASSISTANT
Payer: COMMERCIAL

## 2022-12-12 PROCEDURE — 97110 THERAPEUTIC EXERCISES: CPT

## 2022-12-12 NOTE — PROGRESS NOTES
Diagnosis:  Spinal stenosis, lumbar region, without neurogenic claudication (M48.061)  S/P lumbar laminectomy (D22.435)         Next MD visit: December 13, 2022  Fall Risk: standard         Precautions: n/a          Medication Changes since last visit?: No    Subjective: Patient reports his back is doing well. He reports no pain.     Objective:     Date: 12/12/2022  Visit #: 8/12 HMO (exp. 12/31/22) Date: 12/5/2022  Visit #: 7/12 HMO (exp. 12/31/22) Date: 12/2/2022  Visit #: 6/12 HMO (exp. 12/31/22)   HEP        Therapeutic Exercise   - multifidus walkouts with GSC 1 x 10 ea  - standing lumbar extension 2 x 10  - standing squats with 8# DB at shoulders with overhead press 1 x 10  - standing squats with 8# DB 1 x 10  - prone on elbows 3 minutes  - single leg bridge 1 x 10 ea  - R/L modified side plank 1 x 10 ea  - sidelying R/L hip abduction with 2.5# at ankles 2 x 10 ea  - standing R/L forward step up to 8 inch step 2 x 10 ea  - standing R/L trunk rotation with GSC 1 x 10 ea  - standing B gastroc stretch on slant board level 4-5 3 x 30 sec holds  - bird dog 10x  - shuttle machine B knee ext/flx 7 bands 3 x 10  - shuttle machine R/L knee ext/flx 5 bands 2 x 10 ea - standing lumbar extension 2 x 10  - standing squats 1 x 10  - standing squats with 8# DB at shoulders 1 x 10  - dead bug with legs in table top with OH reach with 8# DB 2 x 10  - supine R/L hamstring stretch with strap 2 x 30 sec holds  - prone on elbows 2 minutes  - supine bridges with blue T-band above knees 3 x 10   - R/L modified side plank 1 x 10 ea  - sidelying R/L hip abduction with 2.5# at ankles 2 x 10 ea  - standing R/L hip abduction/hip extension with BlueTB at ankles 3 x 10 ea  - standing R/L forward step up to 8 inch step 1 x 10 ea  - standing R/L trunk rotation with GSC 1 x 10 ea  - standing B gastroc stretch on slant board level 4-5 3 x 30 sec holds - standing lumbar extension 2 x 10  - standing squats 1 x 10  - standing squats with 8# DB at shoulders 1 x 10  - legs in table top with OH reach with 8# DB 2 x 10  - supine R/L hamstring stretch with strap 2 x 30 sec holds  - prone on elbows 2 minutes  - supine bridges with blue T-band above knees 2 x 10   - sidelying R/L clams with Blue Tband 2 x 10 ea  - shuttle machine B knee ext/flx 7 bands 3 x 10  - shuttle machine R/L knee ext/flx 5 bands 2 x 10 ea  - multifidus walkouts with GSC 10x ea - not today  - standing R/L hip abduction/hip extension with GTB at ankles 2 x 10 ea   Manual Therapy        Therapeutic Activity        Modalities                    Assessment: Initiated single leg bridges today to advance hip strength.      Plan: continue PT    Charges: Ex 3      Total Timed Treatment: 44 min  Total Treatment Time:  44  min

## 2022-12-19 ENCOUNTER — APPOINTMENT (OUTPATIENT)
Dept: PHYSICAL THERAPY | Age: 57
End: 2022-12-19
Attending: PHYSICIAN ASSISTANT
Payer: COMMERCIAL

## 2022-12-19 ENCOUNTER — TELEPHONE (OUTPATIENT)
Dept: PHYSICAL THERAPY | Facility: HOSPITAL | Age: 57
End: 2022-12-19

## 2022-12-23 ENCOUNTER — TELEMEDICINE (OUTPATIENT)
Dept: ENDOCRINOLOGY CLINIC | Facility: CLINIC | Age: 57
End: 2022-12-23

## 2022-12-23 DIAGNOSIS — E11.69 TYPE 2 DIABETES MELLITUS WITH OTHER SPECIFIED COMPLICATION, UNSPECIFIED WHETHER LONG TERM INSULIN USE (HCC): ICD-10-CM

## 2022-12-23 DIAGNOSIS — E11.69 TYPE 2 DIABETES MELLITUS WITH OTHER SPECIFIED COMPLICATION, WITHOUT LONG-TERM CURRENT USE OF INSULIN (HCC): ICD-10-CM

## 2022-12-23 DIAGNOSIS — E78.5 DYSLIPIDEMIA: Primary | ICD-10-CM

## 2022-12-23 PROCEDURE — 99213 OFFICE O/P EST LOW 20 MIN: CPT | Performed by: INTERNAL MEDICINE

## 2022-12-23 RX ORDER — DULAGLUTIDE 1.5 MG/.5ML
1.5 INJECTION, SOLUTION SUBCUTANEOUS WEEKLY
Qty: 6 ML | Refills: 1 | Status: SHIPPED | OUTPATIENT
Start: 2022-12-23

## 2022-12-23 RX ORDER — EMPAGLIFLOZIN 25 MG/1
1 TABLET, FILM COATED ORAL DAILY
Qty: 90 TABLET | Refills: 1 | Status: SHIPPED | OUTPATIENT
Start: 2022-12-23

## 2022-12-27 ENCOUNTER — PATIENT MESSAGE (OUTPATIENT)
Dept: ENDOCRINOLOGY CLINIC | Facility: CLINIC | Age: 57
End: 2022-12-27

## 2022-12-27 DIAGNOSIS — E11.69 TYPE 2 DIABETES MELLITUS WITH OTHER SPECIFIED COMPLICATION, UNSPECIFIED WHETHER LONG TERM INSULIN USE (HCC): Primary | ICD-10-CM

## 2022-12-29 NOTE — TELEPHONE ENCOUNTER
From: Chetna Hernandez  To:  Debroah Ganser, MD  Sent: 12/27/2022 6:01 PM CST  Subject: Eye exam     Buenas vahe faust no me quisieron jeanette el referall con dr Valentino Neville que ustedes tienen que hablar con ellos nesecitohacer un jaimee con [facility] [place]

## 2022-12-30 ENCOUNTER — TELEPHONE (OUTPATIENT)
Dept: CASE MANAGEMENT | Age: 57
End: 2022-12-30

## 2022-12-30 DIAGNOSIS — E11.9 DIABETIC EYE EXAM (HCC): Primary | ICD-10-CM

## 2022-12-30 DIAGNOSIS — Z01.00 DIABETIC EYE EXAM (HCC): Primary | ICD-10-CM

## 2022-12-30 NOTE — TELEPHONE ENCOUNTER
Dr. Sukh Leblanc is requesting a referral for the patient to have a retina evaluation specifically a diabetic eye exam with Dr. Laurita Styles. Pended referral please review diagnosis and sign off if you agree. Thank you.   Anton Aquino

## 2023-01-04 ENCOUNTER — APPOINTMENT (OUTPATIENT)
Dept: PHYSICAL THERAPY | Age: 58
End: 2023-01-04
Attending: PHYSICIAN ASSISTANT
Payer: COMMERCIAL

## 2023-01-11 ENCOUNTER — APPOINTMENT (OUTPATIENT)
Dept: PHYSICAL THERAPY | Age: 58
End: 2023-01-11
Attending: PHYSICIAN ASSISTANT
Payer: COMMERCIAL

## 2023-01-16 ENCOUNTER — APPOINTMENT (OUTPATIENT)
Dept: PHYSICAL THERAPY | Age: 58
End: 2023-01-16
Attending: PHYSICIAN ASSISTANT
Payer: COMMERCIAL

## 2023-01-17 ENCOUNTER — LAB ENCOUNTER (OUTPATIENT)
Dept: LAB | Age: 58
End: 2023-01-17
Attending: FAMILY MEDICINE
Payer: COMMERCIAL

## 2023-01-17 ENCOUNTER — OFFICE VISIT (OUTPATIENT)
Dept: FAMILY MEDICINE CLINIC | Facility: CLINIC | Age: 58
End: 2023-01-17

## 2023-01-17 VITALS
SYSTOLIC BLOOD PRESSURE: 114 MMHG | WEIGHT: 196 LBS | DIASTOLIC BLOOD PRESSURE: 77 MMHG | BODY MASS INDEX: 28.06 KG/M2 | HEART RATE: 71 BPM | HEIGHT: 70 IN

## 2023-01-17 DIAGNOSIS — E11.9 DIABETES MELLITUS TYPE 2 IN NONOBESE (HCC): ICD-10-CM

## 2023-01-17 DIAGNOSIS — I88.9 LYMPHADENITIS: Primary | ICD-10-CM

## 2023-01-17 DIAGNOSIS — N52.8 OTHER MALE ERECTILE DYSFUNCTION: ICD-10-CM

## 2023-01-17 LAB
ALBUMIN SERPL-MCNC: 4.3 G/DL (ref 3.4–5)
ALBUMIN/GLOB SERPL: 1.3 {RATIO} (ref 1–2)
ALP LIVER SERPL-CCNC: 61 U/L
ALT SERPL-CCNC: 31 U/L
ANION GAP SERPL CALC-SCNC: 4 MMOL/L (ref 0–18)
AST SERPL-CCNC: 18 U/L (ref 15–37)
BILIRUB SERPL-MCNC: 0.5 MG/DL (ref 0.1–2)
BUN BLD-MCNC: 22 MG/DL (ref 7–18)
BUN/CREAT SERPL: 19.8 (ref 10–20)
CALCIUM BLD-MCNC: 9.4 MG/DL (ref 8.5–10.1)
CHLORIDE SERPL-SCNC: 104 MMOL/L (ref 98–112)
CHOLEST SERPL-MCNC: 104 MG/DL (ref ?–200)
CO2 SERPL-SCNC: 28 MMOL/L (ref 21–32)
CREAT BLD-MCNC: 1.11 MG/DL
EST. AVERAGE GLUCOSE BLD GHB EST-MCNC: 166 MG/DL (ref 68–126)
FASTING PATIENT LIPID ANSWER: NO
FASTING STATUS PATIENT QL REPORTED: NO
GFR SERPLBLD BASED ON 1.73 SQ M-ARVRAT: 78 ML/MIN/1.73M2 (ref 60–?)
GLOBULIN PLAS-MCNC: 3.2 G/DL (ref 2.8–4.4)
GLUCOSE BLD-MCNC: 108 MG/DL (ref 70–99)
HBA1C MFR BLD: 7.4 % (ref ?–5.7)
HDLC SERPL-MCNC: 41 MG/DL (ref 40–59)
LDLC SERPL CALC-MCNC: 27 MG/DL (ref ?–100)
NONHDLC SERPL-MCNC: 63 MG/DL (ref ?–130)
OSMOLALITY SERPL CALC.SUM OF ELEC: 286 MOSM/KG (ref 275–295)
POTASSIUM SERPL-SCNC: 4.4 MMOL/L (ref 3.5–5.1)
PROT SERPL-MCNC: 7.5 G/DL (ref 6.4–8.2)
SODIUM SERPL-SCNC: 136 MMOL/L (ref 136–145)
TRIGL SERPL-MCNC: 237 MG/DL (ref 30–149)
VLDLC SERPL CALC-MCNC: 31 MG/DL (ref 0–30)

## 2023-01-17 PROCEDURE — 83036 HEMOGLOBIN GLYCOSYLATED A1C: CPT

## 2023-01-17 PROCEDURE — 3051F HG A1C>EQUAL 7.0%<8.0%: CPT | Performed by: INTERNAL MEDICINE

## 2023-01-17 PROCEDURE — 80053 COMPREHEN METABOLIC PANEL: CPT

## 2023-01-17 PROCEDURE — 80061 LIPID PANEL: CPT

## 2023-01-17 PROCEDURE — 3008F BODY MASS INDEX DOCD: CPT | Performed by: FAMILY MEDICINE

## 2023-01-17 PROCEDURE — 99214 OFFICE O/P EST MOD 30 MIN: CPT | Performed by: FAMILY MEDICINE

## 2023-01-17 PROCEDURE — 36415 COLL VENOUS BLD VENIPUNCTURE: CPT

## 2023-01-17 PROCEDURE — 3074F SYST BP LT 130 MM HG: CPT | Performed by: FAMILY MEDICINE

## 2023-01-17 PROCEDURE — 90677 PCV20 VACCINE IM: CPT | Performed by: FAMILY MEDICINE

## 2023-01-17 PROCEDURE — 90471 IMMUNIZATION ADMIN: CPT | Performed by: FAMILY MEDICINE

## 2023-01-17 PROCEDURE — 3078F DIAST BP <80 MM HG: CPT | Performed by: FAMILY MEDICINE

## 2023-01-17 RX ORDER — MELOXICAM 15 MG/1
15 TABLET ORAL DAILY
Qty: 30 TABLET | Refills: 1 | Status: SHIPPED | OUTPATIENT
Start: 2023-01-17 | End: 2023-02-16

## 2023-01-17 RX ORDER — SILDENAFIL 50 MG/1
50 TABLET, FILM COATED ORAL
Qty: 12 TABLET | Refills: 1 | Status: SHIPPED | OUTPATIENT
Start: 2023-01-17

## 2023-01-17 NOTE — PROGRESS NOTES
Blood pressure 114/77, pulse 71, height 5' 10\" (1.778 m), weight 196 lb (88.9 kg). Presents today complaining of pain in the neck. Seems to be positional.  Denies dysphonia denies dysphagia. Saw the dentist no cause was discovered. Minimal nasal congestion. Taking Augmentin prescribed by dentist.    History of diabetes type 2. Also complaining of erectile dysfunction.     Objective some tenderness noted anterior cervical node on the left    Throat clear nonerythematous    Assessment #1 lymphadenitis #2 type 2 diabetes #3 erectile dysfunction    Plan #1 meloxicam prescription patient will follow-up with ENT #2 had recent eye exam blood test ordered #3 Viagra prescription    Will follow with results

## 2023-01-18 ENCOUNTER — APPOINTMENT (OUTPATIENT)
Dept: PHYSICAL THERAPY | Age: 58
End: 2023-01-18
Attending: PHYSICIAN ASSISTANT
Payer: COMMERCIAL

## 2023-01-23 ENCOUNTER — APPOINTMENT (OUTPATIENT)
Dept: PHYSICAL THERAPY | Age: 58
End: 2023-01-23
Attending: PHYSICIAN ASSISTANT
Payer: COMMERCIAL

## 2023-01-25 ENCOUNTER — APPOINTMENT (OUTPATIENT)
Dept: PHYSICAL THERAPY | Age: 58
End: 2023-01-25
Attending: PHYSICIAN ASSISTANT
Payer: COMMERCIAL

## 2023-05-22 ENCOUNTER — PATIENT MESSAGE (OUTPATIENT)
Dept: ENDOCRINOLOGY CLINIC | Facility: CLINIC | Age: 58
End: 2023-05-22

## 2023-05-23 NOTE — TELEPHONE ENCOUNTER
From: Britni Fernandez  To:  Devaughn Hines MD  Sent: 5/22/2023 8:10 PM CDT  Subject: Sam serra que cancele la visita de lexy porque no tengo aseguranza hasta finals de salvador bowers

## 2023-05-23 NOTE — TELEPHONE ENCOUNTER
Pt is cancelled appointment today as he does not have insurance until July. Pt rescheduled himself as below.      Future Appointments   Date Time Provider Enzo Rocha   9/26/2023  3:45 PM Keri Loyola MD [address] [place] [address]

## 2023-09-21 ENCOUNTER — TELEPHONE (OUTPATIENT)
Dept: ENDOCRINOLOGY CLINIC | Facility: CLINIC | Age: 58
End: 2023-09-21

## 2023-09-21 NOTE — TELEPHONE ENCOUNTER
Patient calling states requesting refill on all medications, states would like to wait until 10/2/23 (when insurance will start being active) to receive medications, advised pt to call back as well closer to date with insurance information.

## 2023-10-02 ENCOUNTER — LAB ENCOUNTER (OUTPATIENT)
Dept: LAB | Age: 58
End: 2023-10-02
Attending: FAMILY MEDICINE
Payer: COMMERCIAL

## 2023-10-02 ENCOUNTER — OFFICE VISIT (OUTPATIENT)
Dept: FAMILY MEDICINE CLINIC | Facility: CLINIC | Age: 58
End: 2023-10-02

## 2023-10-02 VITALS
WEIGHT: 188 LBS | BODY MASS INDEX: 26.92 KG/M2 | HEART RATE: 64 BPM | SYSTOLIC BLOOD PRESSURE: 120 MMHG | OXYGEN SATURATION: 99 % | HEIGHT: 70 IN | DIASTOLIC BLOOD PRESSURE: 60 MMHG

## 2023-10-02 DIAGNOSIS — E78.5 HYPERLIPIDEMIA, UNSPECIFIED HYPERLIPIDEMIA TYPE: ICD-10-CM

## 2023-10-02 DIAGNOSIS — E11.9 DIABETES MELLITUS TYPE 2 IN NONOBESE (HCC): Primary | ICD-10-CM

## 2023-10-02 DIAGNOSIS — E11.9 DIABETES MELLITUS TYPE 2 IN NONOBESE (HCC): ICD-10-CM

## 2023-10-02 LAB
ALBUMIN SERPL-MCNC: 3.7 G/DL (ref 3.4–5)
ALBUMIN/GLOB SERPL: 1.1 {RATIO} (ref 1–2)
ALP LIVER SERPL-CCNC: 62 U/L
ALT SERPL-CCNC: 24 U/L
ANION GAP SERPL CALC-SCNC: 7 MMOL/L (ref 0–18)
AST SERPL-CCNC: 13 U/L (ref 15–37)
BILIRUB SERPL-MCNC: 0.4 MG/DL (ref 0.1–2)
BUN BLD-MCNC: 25 MG/DL (ref 7–18)
BUN/CREAT SERPL: 25.3 (ref 10–20)
CALCIUM BLD-MCNC: 8.7 MG/DL (ref 8.5–10.1)
CHLORIDE SERPL-SCNC: 106 MMOL/L (ref 98–112)
CHOLEST SERPL-MCNC: 243 MG/DL (ref ?–200)
CO2 SERPL-SCNC: 26 MMOL/L (ref 21–32)
COMPLEXED PSA SERPL-MCNC: 1.21 NG/ML (ref ?–4)
CREAT BLD-MCNC: 0.99 MG/DL
CREAT UR-SCNC: 35 MG/DL
EGFRCR SERPLBLD CKD-EPI 2021: 89 ML/MIN/1.73M2 (ref 60–?)
EST. AVERAGE GLUCOSE BLD GHB EST-MCNC: 223 MG/DL (ref 68–126)
FASTING PATIENT LIPID ANSWER: NO
FASTING STATUS PATIENT QL REPORTED: NO
GLOBULIN PLAS-MCNC: 3.5 G/DL (ref 2.8–4.4)
GLUCOSE BLD-MCNC: 227 MG/DL (ref 70–99)
HBA1C MFR BLD: 9.4 % (ref ?–5.7)
HDLC SERPL-MCNC: 44 MG/DL (ref 40–59)
LDLC SERPL CALC-MCNC: 122 MG/DL (ref ?–100)
MICROALBUMIN UR-MCNC: <0.5 MG/DL
NONHDLC SERPL-MCNC: 199 MG/DL (ref ?–130)
OSMOLALITY SERPL CALC.SUM OF ELEC: 300 MOSM/KG (ref 275–295)
POTASSIUM SERPL-SCNC: 4.3 MMOL/L (ref 3.5–5.1)
PROT SERPL-MCNC: 7.2 G/DL (ref 6.4–8.2)
SODIUM SERPL-SCNC: 139 MMOL/L (ref 136–145)
TRIGL SERPL-MCNC: 438 MG/DL (ref 30–149)
TSI SER-ACNC: 0.53 MIU/ML (ref 0.36–3.74)
VLDLC SERPL CALC-MCNC: 80 MG/DL (ref 0–30)

## 2023-10-02 PROCEDURE — 3061F NEG MICROALBUMINURIA REV: CPT | Performed by: INTERNAL MEDICINE

## 2023-10-02 PROCEDURE — 80061 LIPID PANEL: CPT

## 2023-10-02 PROCEDURE — 90471 IMMUNIZATION ADMIN: CPT | Performed by: FAMILY MEDICINE

## 2023-10-02 PROCEDURE — 90686 IIV4 VACC NO PRSV 0.5 ML IM: CPT | Performed by: FAMILY MEDICINE

## 2023-10-02 PROCEDURE — 99214 OFFICE O/P EST MOD 30 MIN: CPT | Performed by: FAMILY MEDICINE

## 2023-10-02 PROCEDURE — 82570 ASSAY OF URINE CREATININE: CPT

## 2023-10-02 PROCEDURE — 83036 HEMOGLOBIN GLYCOSYLATED A1C: CPT

## 2023-10-02 PROCEDURE — 36415 COLL VENOUS BLD VENIPUNCTURE: CPT

## 2023-10-02 PROCEDURE — 82043 UR ALBUMIN QUANTITATIVE: CPT

## 2023-10-02 PROCEDURE — 3046F HEMOGLOBIN A1C LEVEL >9.0%: CPT | Performed by: INTERNAL MEDICINE

## 2023-10-02 PROCEDURE — 80053 COMPREHEN METABOLIC PANEL: CPT

## 2023-10-02 PROCEDURE — 84443 ASSAY THYROID STIM HORMONE: CPT

## 2023-10-02 RX ORDER — EMPAGLIFLOZIN 25 MG/1
1 TABLET, FILM COATED ORAL DAILY
Qty: 90 TABLET | Refills: 1 | Status: SHIPPED | OUTPATIENT
Start: 2023-10-02

## 2023-10-02 RX ORDER — DULAGLUTIDE 1.5 MG/.5ML
1.5 INJECTION, SOLUTION SUBCUTANEOUS WEEKLY
Qty: 6 ML | Refills: 1 | Status: SHIPPED | OUTPATIENT
Start: 2023-10-02

## 2023-10-02 RX ORDER — LISINOPRIL 2.5 MG/1
2.5 TABLET ORAL DAILY
Qty: 90 TABLET | Refills: 3 | Status: SHIPPED | OUTPATIENT
Start: 2023-10-02

## 2023-10-02 NOTE — PROGRESS NOTES
Blood pressure 120/60, pulse 64, height 5' 10\" (1.778 m), weight 188 lb (85.3 kg), SpO2 99%. Following up for diabetes no chest pain and no dyspnea. Has not had his medication he has been out. He has lost insurance for 6 months now he is returned to work. Complains of cramping in the feet bilaterally. Objective    Bilateral barefoot skin diabetic exam is normal, visualized feet and the appearance is normal.  Bilateral monofilament/sensation of both feet is normal.  Pulsation pedal pulse exam of both lower legs/feet is normal as well.   L4-S1 motor intact bilaterally      Assessment #1 type 2 diabetes #2 hyperlipidemia  #3 foot cramping medication side effect  Plan #1 and #2 fasting blood test #3 discontinue rosuvastatin    Refill all medications    Also diabetic eye exam ordered and compliance encouraged

## 2023-10-04 NOTE — TELEPHONE ENCOUNTER
Refill passed per [facility] protocol. Please advise- patient has Tadalafil and Sildenafil on file. Medium  Duplicate Therapy: Sildenafil Citrate, TadalafilPHOSPHODIESTERASE TYPE 5 INHIBITORS, IMPOTENCE AGENTS. No Abuse/Dependency Potential.  Requested Prescriptions   Pending Prescriptions Disp Refills    Sildenafil Citrate 50 MG Oral Tab 12 tablet 1     Sig: Take 1 tablet (50 mg total) by mouth daily as needed for Erectile Dysfunction.        Genitourinary Medications Passed - 10/3/2023  9:12 AM        Passed - Patient does not have pulmonary hypertension on problem list        Passed - In person appointment or virtual visit in the past 12 mos or appointment in next 3 mos     Recent Outpatient Visits              2 days ago Diabetes mellitus type 2 in [facility]    [address],[facility] [address], [place], Gorden Israel, DO    Office Visit    8 months ago Lymphadenitis    [facility], [address], [place], Gorden Israel, DO    Office Visit    9 months ago Dyslipidemia    [facility], [address] [place] [address], MD    Telemedicine    9 months ago     FLORA Stevenson in [facility] [address] PT    Office Visit    10 months ago Spinal stenosis, lumbar region, without neurogenic claudication    FLORA Stevenson in [facility] [address] PT    Office Visit          Future Appointments         Provider Department Appt Notes    In 2 months Emerald Mustafa MD [address], [facility] 84 follow up (policy informed)                       Recent Outpatient Visits              2 days ago Diabetes mellitus type 2 in [facility]    [address], [place], Gorden Israel, DO    Office Visit    8 months ago Lymphadenitis    [facility], [facility] [address], [place], Gorden Israel, DO    Office Visit    9 months ago Dyslipidemia    [facility] Marla Bangura, Eddie Batista MD    Telemedicine    9 months ago     FLORA Stevenson in [place], PT    Office Visit    10 months ago Spinal stenosis, lumbar region, without neurogenic claudication    FLORA Stevenson in [place], PT    Office Visit          Future Appointments         Provider Department Appt Notes    In 2 months Felisa Rangel MD [address] [place],[address], [facility] 84 follow up (policy informed)

## 2023-10-05 ENCOUNTER — TELEPHONE (OUTPATIENT)
Dept: FAMILY MEDICINE CLINIC | Facility: CLINIC | Age: 58
End: 2023-10-05

## 2023-10-05 RX ORDER — SILDENAFIL 50 MG/1
50 TABLET, FILM COATED ORAL
Qty: 12 TABLET | Refills: 5 | Status: SHIPPED | OUTPATIENT
Start: 2023-10-05

## 2023-10-05 NOTE — TELEPHONE ENCOUNTER
----- Message from Clemencia Everett DO sent at 10/3/2023  1:12 PM CDT -----  Results reviewed patient to resume medications and follow up with endocrinology as scheduled.

## 2023-10-05 NOTE — TELEPHONE ENCOUNTER
Dulaglutide (TRULICITY) 1.5 SL/6.5UV Subcutaneous Solution Pen-injector 6 mL 1 10/2/2023     Sig - Route: Inject 1.5 mg into the skin once a week. - Subcutaneous    Sent to pharmacy as: Trulicity 1.5 XY/4.4OO Subcutaneous Solution Pen-injector (Dulaglutide)    E-Prescribing Status: Receipt confirmed by pharmacy (10/2/2023 12:14 PM CDT)    [id number] - [address]  [address], [phone], [phone]     Patient stating needs above Rx to be approved by provider. I called [facility], spoke with pharmacist/Swati to inquire if above Rx received and if a PA is required --> insurance is not covered needs a PA, she states she sent request via CoverMyMeds. Triage Support to assist please    ENGLISH TRANSLATION OF [employer] MESSAGE SENT BELOW:    Raffaele Gonzales,    I can see that the Trulicity was sent to your [facility] #[id number] - [place], [address] [facility], [phone], [phone]. I spoke with the pharmacist and she did state the medication is not covered and we will need to obtain a prior authorization from your insurance. We will process the requested prior authorization and contact you with the determination when it is obtained, usually about 5-7 business days. If you should have any further questions or concerns, please don't hesitate to contact our office at [phone], option #2 and ask to speak with a telephone triage nurse. You may also ask for a .     Our phone hours are:  Monday - Friday 8 AM - 4:30 PM  Saturday  8 AM - 12 PM  Sunday   Closed    Thank Anat Suarez, RN-BSN

## 2023-10-06 NOTE — TELEPHONE ENCOUNTER
Pharmacy informed of approval    Approved  [id number]  Prior authorization approved Case ID: [id number]      Payer: Suzy PITTS Case: [id number], Status: Partially Approved, Coverage Starts on: 10/6/2023 12:00:00 AM, Coverage Ends on: 10/5/2024 12:00:00 AM.   Approval Details    Authorization number: [id number]   Authorized from October 6, 2023 to October 5, 2024      Electronic appeal: Not supported   View History

## 2023-10-24 ENCOUNTER — OFFICE VISIT (OUTPATIENT)
Dept: ENDOCRINOLOGY CLINIC | Facility: CLINIC | Age: 58
End: 2023-10-24

## 2023-10-24 VITALS
BODY MASS INDEX: 27.92 KG/M2 | WEIGHT: 195 LBS | HEIGHT: 70 IN | SYSTOLIC BLOOD PRESSURE: 115 MMHG | DIASTOLIC BLOOD PRESSURE: 69 MMHG | HEART RATE: 69 BPM

## 2023-10-24 DIAGNOSIS — E78.5 DYSLIPIDEMIA: ICD-10-CM

## 2023-10-24 DIAGNOSIS — E11.69 TYPE 2 DIABETES MELLITUS WITH OTHER SPECIFIED COMPLICATION, WITHOUT LONG-TERM CURRENT USE OF INSULIN (HCC): Primary | ICD-10-CM

## 2023-10-24 LAB
GLUCOSE BLOOD: 148
TEST STRIP LOT #: NORMAL NUMERIC

## 2023-10-24 PROCEDURE — 3078F DIAST BP <80 MM HG: CPT | Performed by: INTERNAL MEDICINE

## 2023-10-24 PROCEDURE — 99214 OFFICE O/P EST MOD 30 MIN: CPT | Performed by: INTERNAL MEDICINE

## 2023-10-24 PROCEDURE — 3074F SYST BP LT 130 MM HG: CPT | Performed by: INTERNAL MEDICINE

## 2023-10-24 PROCEDURE — 3008F BODY MASS INDEX DOCD: CPT | Performed by: INTERNAL MEDICINE

## 2023-10-24 PROCEDURE — 82947 ASSAY GLUCOSE BLOOD QUANT: CPT | Performed by: INTERNAL MEDICINE

## 2023-10-24 RX ORDER — DULAGLUTIDE 3 MG/.5ML
3 INJECTION, SOLUTION SUBCUTANEOUS WEEKLY
Qty: 6 ML | Refills: 0 | Status: SHIPPED | OUTPATIENT
Start: 2023-10-24

## 2023-10-24 RX ORDER — EMPAGLIFLOZIN 25 MG/1
1 TABLET, FILM COATED ORAL DAILY
Qty: 90 TABLET | Refills: 1 | Status: SHIPPED | OUTPATIENT
Start: 2023-10-24

## 2023-10-24 RX ORDER — ATORVASTATIN CALCIUM 20 MG/1
20 TABLET, FILM COATED ORAL NIGHTLY
Qty: 90 TABLET | Refills: 0 | Status: SHIPPED | OUTPATIENT
Start: 2023-10-24

## 2023-10-24 RX ORDER — DULAGLUTIDE 1.5 MG/.5ML
1.5 INJECTION, SOLUTION SUBCUTANEOUS WEEKLY
Qty: 6 ML | Refills: 1 | Status: CANCELLED | OUTPATIENT
Start: 2023-10-24

## 2023-10-24 NOTE — PROGRESS NOTES
Return Office Visit     CHIEF COMPLAINT:    DM   Hypertriglyceridemia    HISTORY OF PRESENT ILLNESS:  Bhupinder Spears is a 63year old male who presents for follow up for DM. DM HISTORY:  Diagnosed: Around age 29      HISTORY OF DIABETES COMPLICATIONS: :  History of Retinopathy: yes , last eye exam 2021, will schedule eye exam now, he has no insurance  will wait for insurance to get eye exam  History of Neuropathy: no   History of Nephropathy: no     ASSOCIATED COMPLICATIONS:    HTN: no  Hyperlipidemia: no   Coronary Artery Disease:  No   Cerebrovascular Disease:no       HOME GLUCOSE READINGS:    States that Bg were high in the 200s  He did not have medication for many month  Recently resumes in Oct 2023  He will be running our of his current insurance in a few days, will start new  insurance in Jan 2024      No low BG under 70        CURRENT DIABETIC MEDICATIONS INCLUDE:  Recently resumes  MTF 1830 mg BID  Trulicity 1.5 mg q week  Jardiance 25 mg daily    MEALS:  Three meals a day  He has been compliant with a low carb diet    EXERCISE:  Has a dog and walks daily      CURRENT MEDICATION:    Current Outpatient Medications   Medication Sig Dispense Refill    Empagliflozin (JARDIANCE) 25 MG Oral Tab Take 1 tablet by mouth daily. 90 tablet 1    metFORMIN HCl 1000 MG Oral Tab Take 1 tablet (1,000 mg total) by mouth 2 (two) times daily with meals. 180 tablet 1    Dulaglutide (TRULICITY) 3 ROSALINO/9.6YF Subcutaneous Solution Pen-injector Inject 3 mg into the skin once a week. 6 mL 0    atorvastatin 20 MG Oral Tab Take 1 tablet (20 mg total) by mouth nightly. 90 tablet 0    Sildenafil Citrate 50 MG Oral Tab Take 1 tablet (50 mg total) by mouth daily as needed for Erectile Dysfunction. 12 tablet 5    lisinopril 2.5 MG Oral Tab Take 1 tablet (2.5 mg total) by mouth daily. 90 tablet 3    fluticasone-salmeterol (ADVAIR HFA) 230-21 MCG/ACT Inhalation Aerosol Inhale 1 puff into the lungs 2 (two) times daily.  1 each 1 colchicine 0.6 MG Oral Tab Take 1 tablet (0.6 mg total) by mouth in the morning, at noon, in the evening, and at bedtime. WHEN NEEDED FOR PAIN STOP ROSUVASTATIN WHILE TAKING COLCHICINE 60 tablet 0    aspirin 81 MG Oral Chew Tab Chew 1 tablet (81 mg total) by mouth daily. 30 tablet 0         ALLERGY:  No Known Allergies    PAST MEDICAL, SOCIAL AND FAMILY HISTORY:  See past medical history marked as reviewed. See past surgical history marked as reviewed. See past family history marked as reviewed. See past social history marked as reviewed. ASSESSMENTS:       REVIEW OF SYSTEMS:  Constitutional: Negative for: weight change, fever, fatigue, cold/heat intolerance  Eyes: Negative for:  Visual changes, proptosis, blurring  ENT: Negative for:  dysphagia, neck swelling, dysphonia  Respiratory: Negative for:  dyspnea, cough  Cardiovascular: Negative for:  chest pain, palpitations, orthopnea  GI: Negative for:  abdominal pain, nausea, vomiting, diarrhea, constipation, bleeding  Neurology: Negative for: headache, numbness, weakness  Genito-Urinary: Negative for: dysuria, frequency  Psychiatric: Negative for:  depression, anxiety  Hematology/Lymphatics: Negative for: bruising, lower extremity edema  Endocrine: Negative for: polyuria, polydypsia  Skin: Negative for: rash, blister, cellulitis,         PHYSICAL EXAM:     Vitals reviewed    General Appearance:  alert, well developed, in no acute distress  Head: Atraumatic  Eyes:  normal conjunctivae, sclera. , normal sclera and normal pupils  Throat/Neck: normal sound to voice. Normal hearing, normal speech  Respiratory:  Speaking in full sentences, non-labored.  no increased work of breathing, no audible wheezing    Neuro: motor grossly intact, moving all extremities without difficulty  Psychiatric:  oriented to time, self, and place  Extremities:   Bilateral barefoot skin diabetic exam is normal, visualized feet and the appearance is normal.  Bilateral monofilament/sensation of both feet is normal.  Pulsation pedal pulse exam of both lower legs/feet is normal as well. DATA:               A1c 9.4 % ( 10.2023)    ASSESSMENT AND PLAN:    1. Type 2 DM:      Plan:  Discussed the pathogenesis, natural course of diabetes. Patient understands the importance of glycemic control and the implications of uncontrolled diabetes including Diabetic ketoacidosis and various micro vascular and macrovascular complications. a). Medications:    Was off medication for many months, now resumed but Bg still in the 140-16-0s pre meals    - c/w Metformin 1000 mg BID  No GI side effects.     -  Trulicity  1.5 --> 3 mg q week  No personal or family history of MEN syndrome  Patient counselled regarding side effects including injection site reactions, nausea, vomiting, diarrhea, pancreatitis, gastroparesis and rare side effect abdulkadir Abdirizak syndrome. -  Jardiance 25 mg daily  Discussed side effects including UTI and fungal infections. Discussed importance of hydration  Discussed recent FDA data about genital infections  Complete labs ordered by PCP     Check and call with sugars as discussed    b). No nephropathy  C). Instructed on importance of annual eye exams. He has retinopathy, referral to retina specialist provided  d). Foot exam: Daily feet exam explained  e). BG log maintainence explained in great detail, to get log and glucometer on next visit. f). Life style changes reviewed  g). Hypoglycemia recognition and management discussed    2. .Dyslipidemia    A) Discussed lifestyle modifications including reductions in dietary total and saturated fat, weight loss, aerobic exercise, and eating a diet rich in fruits and vegetables. B) resume atorvastatin. To call if he has any SE  Like muscle cramping  c) Fasting lipid panel  in three months  RTC in 3-4 months.      To call if he is not able to medication

## 2024-01-03 ENCOUNTER — PATIENT MESSAGE (OUTPATIENT)
Dept: ENDOCRINOLOGY CLINIC | Facility: CLINIC | Age: 59
End: 2024-01-03

## 2024-01-03 ENCOUNTER — TELEPHONE (OUTPATIENT)
Dept: ENDOCRINOLOGY CLINIC | Facility: CLINIC | Age: 59
End: 2024-01-03

## 2024-01-03 ENCOUNTER — TELEPHONE (OUTPATIENT)
Dept: FAMILY MEDICINE CLINIC | Facility: CLINIC | Age: 59
End: 2024-01-03

## 2024-01-03 RX ORDER — DULAGLUTIDE 3 MG/.5ML
3 INJECTION, SOLUTION SUBCUTANEOUS WEEKLY
Qty: 6 ML | Refills: 0 | Status: SHIPPED | OUTPATIENT
Start: 2024-01-03

## 2024-01-03 NOTE — TELEPHONE ENCOUNTER
Patients MCM translates to, \"Hi  I have new insurance. I need you to order my Trulicity 3 MG to the [facility] in [place] and [facility]\"       Rx pending below, please approve if appropriate.

## 2024-01-03 NOTE — TELEPHONE ENCOUNTER
Patient called requesting a refill on the following medication. It does show that  was the prescribing Dr for these medications and I informed him of that but he wanted to see if  can refill them.    Dulaglutide (TRULICITY) 3 MG/0.5ML Subcutaneous Solution Pen-injector     Empagliflozin (JARDIANCE) 25 MG Oral Tab

## 2024-01-03 NOTE — TELEPHONE ENCOUNTER
Pharmacy requesting pa for     Dulaglutide (TRULICITY) 3 MG/0.5ML Subcutaneous Solution Pen-injector, Inject 3 mg into the skin once a week., Disp: 6 mL, Rfl: 0    KEY: [id number]

## 2024-01-05 NOTE — TELEPHONE ENCOUNTER
Trulicity PA submitted in [employer].     Case Id  [id number]  Reference Id  [id number]

## 2024-01-15 ENCOUNTER — OFFICE VISIT (OUTPATIENT)
Dept: FAMILY MEDICINE CLINIC | Facility: CLINIC | Age: 59
End: 2024-01-15
Payer: COMMERCIAL

## 2024-01-15 ENCOUNTER — HOSPITAL ENCOUNTER (OUTPATIENT)
Dept: GENERAL RADIOLOGY | Age: 59
Discharge: HOME | End: 2024-01-15
Attending: FAMILY MEDICINE
Payer: COMMERCIAL

## 2024-01-15 VITALS
SYSTOLIC BLOOD PRESSURE: 110 MMHG | WEIGHT: 201 LBS | HEIGHT: 70 IN | BODY MASS INDEX: 28.77 KG/M2 | HEART RATE: 67 BPM | DIASTOLIC BLOOD PRESSURE: 73 MMHG

## 2024-01-15 DIAGNOSIS — R05.9 COUGH, UNSPECIFIED TYPE: Primary | ICD-10-CM

## 2024-01-15 DIAGNOSIS — R05.9 COUGH, UNSPECIFIED TYPE: ICD-10-CM

## 2024-01-15 DIAGNOSIS — E11.9 DIABETES MELLITUS TYPE 2 IN NONOBESE (HCC): ICD-10-CM

## 2024-01-15 PROCEDURE — 3078F DIAST BP <80 MM HG: CPT | Performed by: FAMILY MEDICINE

## 2024-01-15 PROCEDURE — 71046 X-RAY EXAM CHEST 2 VIEWS: CPT | Performed by: FAMILY MEDICINE

## 2024-01-15 PROCEDURE — 3074F SYST BP LT 130 MM HG: CPT | Performed by: FAMILY MEDICINE

## 2024-01-15 PROCEDURE — 3008F BODY MASS INDEX DOCD: CPT | Performed by: FAMILY MEDICINE

## 2024-01-15 PROCEDURE — 99213 OFFICE O/P EST LOW 20 MIN: CPT | Performed by: FAMILY MEDICINE

## 2024-01-15 RX ORDER — ALBUTEROL SULFATE 2.5 MG/3ML
1.25 SOLUTION RESPIRATORY (INHALATION) EVERY 6 HOURS PRN
Qty: 100 EACH | Refills: 1 | Status: SHIPPED | OUTPATIENT
Start: 2024-01-15 | End: 2024-04-24

## 2024-01-15 RX ORDER — BENZONATATE 100 MG/1
100 CAPSULE ORAL 3 TIMES DAILY PRN
Qty: 45 CAPSULE | Refills: 0 | Status: SHIPPED | OUTPATIENT
Start: 2024-01-15

## 2024-01-15 RX ORDER — TADALAFIL 20 MG/1
20 TABLET ORAL AS NEEDED
Qty: 2412 TABLET | Refills: 3 | Status: SHIPPED | OUTPATIENT
Start: 2024-01-15

## 2024-01-16 NOTE — PROGRESS NOTES
Blood pressure 110/73, pulse 67, height 5' 10\" (1.778 m), weight 201 lb (91.2 kg).      2-month history of cough.  No nocturnal cough no nasal congestion no phlegm production.  No sore throat no fever no chills.  No sneezing no watery or itchy eyes.  No asthma history no smoking history.  Using tea and cough syrup without relief.  Some relief with nebulizer.  Also requesting Cialis for erectile dysfunction  Objective    Patient is comfortable no apparent distress    Lungs clear to auscultation no rales rhonchi or wheeze    Throat clear nonerythematous    Bilateral barefoot skin diabetic exam is normal, visualized feet and the appearance is normal.  Bilateral monofilament/sensation of both feet is normal.  Pulsation pedal pulse exam of both lower legs/feet is normal as well.      Assessment #1 cough persistent #2 type 2 diabetes #3 erectile dysfunction    Plan #1 Tessalon Perles refilled albuterol nebs #2 follow-up with endocrinology also ophthalmology referral entered #3 Cialis prescription    Follow-up in 4 months

## 2024-01-18 ENCOUNTER — NURSE TRIAGE (OUTPATIENT)
Dept: FAMILY MEDICINE CLINIC | Facility: CLINIC | Age: 59
End: 2024-01-18

## 2024-01-18 ENCOUNTER — TELEPHONE (OUTPATIENT)
Dept: FAMILY MEDICINE CLINIC | Facility: CLINIC | Age: 59
End: 2024-01-18

## 2024-01-18 ENCOUNTER — OFFICE VISIT (OUTPATIENT)
Dept: INTERNAL MEDICINE CLINIC | Facility: CLINIC | Age: 59
End: 2024-01-18
Payer: COMMERCIAL

## 2024-01-18 VITALS
BODY MASS INDEX: 28.35 KG/M2 | DIASTOLIC BLOOD PRESSURE: 69 MMHG | HEART RATE: 67 BPM | WEIGHT: 198 LBS | HEIGHT: 70 IN | SYSTOLIC BLOOD PRESSURE: 109 MMHG

## 2024-01-18 DIAGNOSIS — M10.9 ACUTE GOUT OF FOOT, UNSPECIFIED CAUSE, UNSPECIFIED LATERALITY: Primary | ICD-10-CM

## 2024-01-18 DIAGNOSIS — J45.991 COUGH VARIANT ASTHMA: ICD-10-CM

## 2024-01-18 PROCEDURE — 99214 OFFICE O/P EST MOD 30 MIN: CPT | Performed by: STUDENT IN AN ORGANIZED HEALTH CARE EDUCATION/TRAINING PROGRAM

## 2024-01-18 PROCEDURE — 3078F DIAST BP <80 MM HG: CPT | Performed by: STUDENT IN AN ORGANIZED HEALTH CARE EDUCATION/TRAINING PROGRAM

## 2024-01-18 PROCEDURE — 3074F SYST BP LT 130 MM HG: CPT | Performed by: STUDENT IN AN ORGANIZED HEALTH CARE EDUCATION/TRAINING PROGRAM

## 2024-01-18 PROCEDURE — 3008F BODY MASS INDEX DOCD: CPT | Performed by: STUDENT IN AN ORGANIZED HEALTH CARE EDUCATION/TRAINING PROGRAM

## 2024-01-18 RX ORDER — NAPROXEN 500 MG/1
500 TABLET ORAL 2 TIMES DAILY WITH MEALS
Qty: 60 TABLET | Refills: 0 | Status: CANCELLED
Start: 2024-01-18 | End: 2024-02-17

## 2024-01-18 RX ORDER — DOXYCYCLINE HYCLATE 100 MG/1
100 CAPSULE ORAL 2 TIMES DAILY WITH MEALS
Qty: 14 CAPSULE | Refills: 0 | Status: SHIPPED | OUTPATIENT
Start: 2024-01-18 | End: 2024-01-25

## 2024-01-18 RX ORDER — COLCHICINE 0.6 MG/1
CAPSULE ORAL
Qty: 23 CAPSULE | Refills: 0 | Status: CANCELLED
Start: 2024-01-18 | End: 2024-01-29

## 2024-01-18 RX ORDER — PREDNISONE 10 MG/1
TABLET ORAL
Qty: 30 TABLET | Refills: 0 | Status: CANCELLED | OUTPATIENT
Start: 2024-01-18 | End: 2024-01-29

## 2024-01-18 RX ORDER — FLUTICASONE PROPIONATE AND SALMETEROL XINAFOATE 230; 21 UG/1; UG/1
2 AEROSOL, METERED RESPIRATORY (INHALATION) 2 TIMES DAILY
Qty: 1 EACH | Refills: 3 | Status: SHIPPED | OUTPATIENT
Start: 2024-01-18

## 2024-01-18 RX ORDER — PREDNISONE 10 MG/1
TABLET ORAL
Qty: 30 TABLET | Refills: 0 | Status: SHIPPED | OUTPATIENT
Start: 2024-01-18 | End: 2024-01-30

## 2024-01-18 NOTE — TELEPHONE ENCOUNTER
Action Requested: Summary for Provider     []  Critical Lab, Recommendations Needed  [] Need Additional Advice  [x]   FYI    []   Need Orders  [] Need Medications Sent to Pharmacy  []  Other     SUMMARY: DR. Og Appointment today. Right Foot Pain, history of Gout.     Reason for call: Foot Pain    RN called patient for Cialis Encounter.   But he also mentions Right Foot Pain, asking for Colchicine refill   Right foot, near the arch of the foot.     Language Line utilized.  ID # [id number], Beatrice.    Requesting refill: Colchicine     Triaged per protocol and advised care advice per protocol.   No redness, no swelling. Skin intact, Normal skin color for patient.     More details regarding Symptoms under Additional Documentation > Protocols Used.     Evaluation advised today.   Appointment made.    Address provided.  Patient verbalizes understanding and is agreeable to instructions.     Future Appointments   Date Time Provider Department Center   1/18/2024  5:20 PM Wood Og MD [facility]   4/5/2024  2:15 PM [facility]           Reason for Disposition   MODERATE pain (e.g., interferes with normal activities, limping) and present > 3 days    Protocols used: Foot Pain-A-[place]

## 2024-01-18 NOTE — TELEPHONE ENCOUNTER
[place] called back requesting an update, please see previous TE for today. Pt. Keeps calling pharmacy and advised they will come in soon.

## 2024-01-18 NOTE — PROGRESS NOTES
OFFICE NOTE     Patient ID: Christian Campos is a 58 year old male.  Today's Date: 01/18/24  Chief Complaint: Gout (R foot pain )    Pt is 59y/o male with extensive PMHx DM2, COPD, HTN, HLD, chronic lower back pain, erectile dysfunction, Gout, presenting to clinic with acute right foot pain (requesting colchicine refill near arch of foot). Pt last seen by PCP 3 days for cough (for 2 months) given nebulizer and cialis for ED, presents today with right foot pain.Pt had similar gout flare in right foot arch 2 years ago and also ongoing cough only short improvement with albuterol and no improvement with tessalon.    CMP from 10/2023: GFR 89, Cr. 0.99 (no renal impairment).     Gout  This is a new problem. The current episode started in the past 7 days. The problem has been gradually worsening. Associated symptoms include coughing, joint swelling and myalgias. Pertinent negatives include no anorexia, arthralgias, change in bowel habit, chest pain, chills, congestion, diaphoresis, fatigue, fever, headaches, neck pain, rash, sore throat, swollen glands, vertigo, visual change, vomiting or weakness.   Asthma  This is a chronic problem. The current episode started more than 1 month ago. The problem occurs constantly. The problem has been gradually worsening. Pertinent negatives include no chest pain, coryza, fever, headaches, neck pain, orthopnea, PND, rash, sore throat, sputum production, swollen glands, syncope or vomiting. His past medical history is significant for asthma.       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  BP Readings from Last 3 Encounters:   01/18/24 109/69   01/15/24 110/73   10/24/23 115/69     The 10-year ASCVD risk score (Rosa M SUMMERS, et al., 2019) is: 14.9%    Values used to calculate the score:      Age: 58 years      Sex: Male      Is  Non- : No      Diabetic: Yes      Tobacco smoker: No      Systolic Blood Pressure: 109 mmHg      Is BP treated: Yes      HDL Cholesterol: 44 mg/dL      Total Cholesterol: 243 mg/dL      Medications reviewed:  Current Outpatient Medications   Medication Sig Dispense Refill    Tadalafil (CIALIS) 20 MG Oral Tab Take 1 tablet (20 mg total) by mouth as needed for Erectile Dysfunction. 12 tablet 5    colchicine 0.6 MG Oral Tab Take 1 tablet (0.6 mg total) by mouth in the morning, at noon, in the evening, and at bedtime. WHEN NEEDED FOR PAIN STOP ROSUVASTATIN WHILE TAKING COLCHICINE 40 tablet 0    predniSONE 10 MG Oral Tab Take 4 tablets (40 mg total) by mouth daily for 3 days, THEN 3 tablets (30 mg total) daily for 3 days, THEN 2 tablets (20 mg total) daily for 3 days, THEN 1 tablet (10 mg total) daily for 3 days. TAKE WITH FOOD.. 30 tablet 0    fluticasone-salmeterol 230-21 MCG/ACT Inhalation Aerosol Inhale 2 puffs into the lungs 2 (two) times daily. FOR ASTHMA. 1 YEAR SUPPLY. GENERIC. 1 each 3    doxycycline 100 MG Oral Cap Take 1 capsule (100 mg total) by mouth 2 (two) times daily with meals for 7 days. 14 capsule 0    benzonatate (TESSALON PERLES) 100 MG Oral Cap Take 1 capsule (100 mg total) by mouth 3 (three) times daily as needed. 45 capsule 0    albuterol (2.5 MG/3ML) 0.083% Inhalation Nebu Soln Take 1.5 mL (1.25 mg total) by nebulization every 6 (six) hours as needed for Wheezing. 100 each 1    Dulaglutide (TRULICITY) 3 MG/0.5ML Subcutaneous Solution Pen-injector Inject 3 mg into the skin once a week. 6 mL 0    Empagliflozin (JARDIANCE) 25 MG Oral Tab Take 1 tablet by mouth daily. 90 tablet 1    metFORMIN HCl 1000 MG Oral Tab Take 1 tablet (1,000 mg total) by mouth 2 (two) times daily with meals. 180 tablet 1    atorvastatin 20 MG Oral Tab Take 1 tablet (20 mg total) by mouth nightly. 90 tablet 0    lisinopril 2.5 MG Oral Tab Take 1 tablet (2.5 mg total) by mouth daily. 90 tablet 3     aspirin 81 MG Oral Chew Tab Chew 1 tablet (81 mg total) by mouth daily. 30 tablet 0         Assessment & Plan    1. Acute gout of foot, unspecified cause, unspecified laterality (Primary)  -     predniSONE; Take 4 tablets (40 mg total) by mouth daily for 3 days, THEN 3 tablets (30 mg total) daily for 3 days, THEN 2 tablets (20 mg total) daily for 3 days, THEN 1 tablet (10 mg total) daily for 3 days. TAKE WITH FOOD..  Dispense: 30 tablet; Refill: 0  2. Cough variant asthma  -     predniSONE; Take 4 tablets (40 mg total) by mouth daily for 3 days, THEN 3 tablets (30 mg total) daily for 3 days, THEN 2 tablets (20 mg total) daily for 3 days, THEN 1 tablet (10 mg total) daily for 3 days. TAKE WITH FOOD..  Dispense: 30 tablet; Refill: 0  -     Fluticasone-Salmeterol; Inhale 2 puffs into the lungs 2 (two) times daily. FOR ASTHMA. 1 YEAR SUPPLY. GENERIC.  Dispense: 1 each; Refill: 3  -     Doxycycline Hyclate; Take 1 capsule (100 mg total) by mouth 2 (two) times daily with meals for 7 days.  Dispense: 14 capsule; Refill: 0  Pt with Chronic Cough (>8 weeks) likely triggered from recent URI in past 2 months with burdensome symptoms   -I personally reviewed recent CXR and no obvious infiltrate but does have santhosh-hilar fullness and bronchial inflammation per my interpretation  -start doxycyline 100mg po BID for 10days for atypical coverage.   -Given severity / given PMHx of Asthma, will start prednisone 40mg for 3 days and taper by 10mg every 3 days  -Noted Wheezing and/or post viral cough will need to start rescue Albuterol HFA and ICS/LABA inhalation (covered by insurance) and follow up within the next couple to weeks.        Follow Up: As needed/if symptoms worsen or No follow-ups on file..         Objective/ Results:   Physical Exam  Constitutional:       Appearance: He is well-developed.   Cardiovascular:      Rate and Rhythm: Normal rate and regular rhythm.      Heart sounds: Normal heart sounds.   Pulmonary:      Effort:  Pulmonary effort is normal.      Breath sounds: Wheezing and rhonchi present.   Abdominal:      General: Bowel sounds are normal.      Palpations: Abdomen is soft.   Skin:     General: Skin is warm and dry.   Neurological:      Mental Status: He is alert and oriented to person, place, and time.      Deep Tendon Reflexes: Reflexes are normal and symmetric.        Reviewed:    Patient Active Problem List    Diagnosis    Chronic obstructive pulmonary disease, unspecified (HCC)    Essential hypertension    Preop testing    Mechanical low back pain    Lumbar spinal stenosis    Bulge of lumbar disc without myelopathy    Chronic left-sided low back pain with left-sided sciatica    Left buttock pain    Chronic left SI joint pain    Facet syndrome, lumbar    Lumbar trigger point syndrome    Lumbar radiculopathy, acute    Myalgia    Sacroiliac joint dysfunction of right side    Myofascial pain    Other male erectile dysfunction    Acute left-sided low back pain without sciatica    Sacroiliac joint dysfunction of left side    Diabetes mellitus type 2 in nonobese ([facility])    Routine physical examination    Hyperlipidemia    Diabetes mellitus, type 2 ([facility])      No Known Allergies     Social History     Socioeconomic History    Marital status:    Tobacco Use    Smoking status: Former     Types: Cigarettes     Quit date: 2005     Years since quittin.6     Passive exposure: Past    Smokeless tobacco: Never   Vaping Use    Vaping Use: Never used   Substance and Sexual Activity    Alcohol use: Yes     Alcohol/week: 12.0 standard drinks of alcohol     Types: 12 Cans of beer per week     Comment: WEEKENDS     Drug use: No   Other Topics Concern    Caffeine Concern Yes     Comment: Coffee: 1 cup daily    Exercise No   Social History Narrative    The patient does not use an assistive device..      The patient does not live in a home with stairs.      Review of Systems   Constitutional: Negative.  Negative for chills,  diaphoresis, fatigue and fever.   HENT:  Negative for congestion and sore throat.    Respiratory:  Positive for cough. Negative for sputum production.    Cardiovascular: Negative.  Negative for chest pain, orthopnea, syncope and PND.   Gastrointestinal: Negative.  Negative for anorexia, change in bowel habit and vomiting.   Musculoskeletal:  Positive for gout, joint swelling and myalgias. Negative for arthralgias and neck pain.   Skin: Negative.  Negative for rash.   Neurological: Negative.  Negative for vertigo, weakness and headaches.     All other systems negative unless otherwise stated in ROS or HPI above.       Wood Og MD  Internal Medicine       Call office with any questions or seek emergency care if necessary.   Patient understands and agrees to follow directions and advice.      ----------------------------------------- PATIENT INSTRUCTIONS-----------------------------------------     There are no Patient Instructions on file for this visit.

## 2024-01-18 NOTE — TELEPHONE ENCOUNTER
[facility] [place]  [phone]     Call them regarding question: re: cialis, hardcopy quantity says:  2,412 tablets    See previous TE from today as well.

## 2024-01-23 NOTE — TELEPHONE ENCOUNTER
Received fax from "[employer] in regards to Trulicity. Medication has been approved from 1/23/24 to 1/23/25. Approval letter sent for scanning and [employer] messge sent to pt.  Approval # - [id number]

## 2024-02-13 ENCOUNTER — OFFICE VISIT (OUTPATIENT)
Dept: FAMILY MEDICINE CLINIC | Facility: CLINIC | Age: 59
End: 2024-02-13

## 2024-02-13 VITALS
BODY MASS INDEX: 28.35 KG/M2 | DIASTOLIC BLOOD PRESSURE: 76 MMHG | HEART RATE: 67 BPM | WEIGHT: 198 LBS | HEIGHT: 70 IN | SYSTOLIC BLOOD PRESSURE: 124 MMHG

## 2024-02-13 DIAGNOSIS — M94.0 COSTOCHONDRITIS: Primary | ICD-10-CM

## 2024-02-13 PROCEDURE — 3074F SYST BP LT 130 MM HG: CPT | Performed by: FAMILY MEDICINE

## 2024-02-13 PROCEDURE — 3078F DIAST BP <80 MM HG: CPT | Performed by: FAMILY MEDICINE

## 2024-02-13 PROCEDURE — 3008F BODY MASS INDEX DOCD: CPT | Performed by: FAMILY MEDICINE

## 2024-02-13 PROCEDURE — 99213 OFFICE O/P EST LOW 20 MIN: CPT | Performed by: FAMILY MEDICINE

## 2024-02-13 RX ORDER — MELOXICAM 15 MG/1
15 TABLET ORAL DAILY
Qty: 30 TABLET | Refills: 1 | Status: SHIPPED | OUTPATIENT
Start: 2024-02-13 | End: 2024-04-13

## 2024-02-13 RX ORDER — OMEPRAZOLE 20 MG/1
20 CAPSULE, DELAYED RELEASE ORAL
COMMUNITY
Start: 2024-02-13

## 2024-02-13 NOTE — PROGRESS NOTES
Blood pressure 124/76, pulse 67, height 5' 10\" (1.778 m), weight 198 lb (89.8 kg).      3 weeks of substernal chest pain to the left.  It is positional and pressure-like.  No radiation severity 3/10.  Today it is continuous.  Patient uses his arms at work.  Currently he reports that he feels well.  He still has some discomfort.    Objective reviewed cardiac catheterization report from 18 months ago without obstructive disease that was significant    Heart regular rate rhythm    Neck supple no carotid bruits    Assessment costochondritis    Plan meloxicam with food no alcohol take omeprazole daily follow-up if no improvement

## 2024-04-09 ENCOUNTER — TELEPHONE (OUTPATIENT)
Dept: FAMILY MEDICINE CLINIC | Facility: CLINIC | Age: 59
End: 2024-04-09

## 2024-04-15 RX ORDER — DULAGLUTIDE 3 MG/.5ML
3 INJECTION, SOLUTION SUBCUTANEOUS WEEKLY
Qty: 6 ML | Refills: 0 | Status: SHIPPED | OUTPATIENT
Start: 2024-04-15

## 2024-04-15 NOTE — TELEPHONE ENCOUNTER
Endocrine Refill protocol for oral and injectable diabetic medications    Protocol Criteria:    -Appointment with Endocrinology completed in the last 6 months or scheduled in the next 3 months    -A1c result <8.5% in the past 6 months      Verify the above has been completed or scheduled in the appropriate timeline. If so can send a 90 day supply with 1 refill.     Last completed office visit:10/24/23  Next scheduled Follow up: 06/11/24  Last A1c result: 9.4%

## 2024-04-19 RX ORDER — EMPAGLIFLOZIN 25 MG/1
1 TABLET, FILM COATED ORAL DAILY
Qty: 90 TABLET | Refills: 1 | Status: SHIPPED | OUTPATIENT
Start: 2024-04-19

## 2024-04-19 RX ORDER — EMPAGLIFLOZIN 25 MG/1
1 TABLET, FILM COATED ORAL DAILY
Qty: 90 TABLET | Refills: 1 | OUTPATIENT
Start: 2024-04-19

## 2024-04-19 RX ORDER — EMPAGLIFLOZIN 25 MG/1
1 TABLET, FILM COATED ORAL DAILY
Qty: 90 TABLET | Refills: 1 | Status: CANCELLED | OUTPATIENT
Start: 2024-04-19

## 2024-04-19 RX ORDER — EMPAGLIFLOZIN 25 MG/1
1 TABLET, FILM COATED ORAL DAILY
Qty: 90 TABLET | Refills: 0 | Status: SHIPPED | OUTPATIENT
Start: 2024-04-19 | End: 2024-04-19

## 2024-04-19 NOTE — TELEPHONE ENCOUNTER
Endocrine Refill protocol for oral and injectable diabetic medications    Protocol Criteria:    -Appointment with Endocrinology completed in the last 6 months or scheduled in the next 3 months    -A1c result <8.5% in the past 6 months      Verify the above has been completed or scheduled in the appropriate timeline. If so can send a 90 day supply with 1 refill.     Last completed office visit:10/24/23  Next scheduled Follow up: 06/11/24  Last A1c result: 9.4

## 2024-05-09 NOTE — TELEPHONE ENCOUNTER
HR=83 bpm, OBDV=638/76 mmhg, SpO2=92.0 %, Resp=20 B/min, EtCO2=32 mmHg, Apnea=2 Seconds, Pain=6, Rosalino=10, [place]=2 Noted. Thank you.

## 2024-05-21 RX ORDER — DULAGLUTIDE 3 MG/.5ML
3 INJECTION, SOLUTION SUBCUTANEOUS WEEKLY
Qty: 6 ML | Refills: 0 | Status: SHIPPED | OUTPATIENT
Start: 2024-05-21

## 2024-05-21 NOTE — TELEPHONE ENCOUNTER
Endocrine Refill protocol for oral and injectable diabetic medications    Protocol Criteria:    -Appointment with Endocrinology completed in the last 6 months or scheduled in the next 3 months    -A1c result <8.5% in the past 6 months      Verify the above has been completed or scheduled in the appropriate timeline. If so can send a 90 day supply with 1 refill.     Last completed office visit: 10/24/23  Next scheduled Follow up: 06/11/24     Last A1c result: Last A1c value was 9.4% done 10/2/2023.

## 2024-05-23 ENCOUNTER — OFFICE VISIT (OUTPATIENT)
Dept: FAMILY MEDICINE CLINIC | Facility: CLINIC | Age: 59
End: 2024-05-23

## 2024-05-23 VITALS
SYSTOLIC BLOOD PRESSURE: 114 MMHG | DIASTOLIC BLOOD PRESSURE: 74 MMHG | BODY MASS INDEX: 27.63 KG/M2 | HEIGHT: 70 IN | HEART RATE: 71 BPM | WEIGHT: 193 LBS

## 2024-05-23 DIAGNOSIS — N52.8 OTHER MALE ERECTILE DYSFUNCTION: ICD-10-CM

## 2024-05-23 DIAGNOSIS — L98.8 MACERATION OF SKIN: ICD-10-CM

## 2024-05-23 DIAGNOSIS — E11.9 DIABETES MELLITUS TYPE 2 IN NONOBESE (HCC): Primary | ICD-10-CM

## 2024-05-23 PROCEDURE — 3074F SYST BP LT 130 MM HG: CPT | Performed by: FAMILY MEDICINE

## 2024-05-23 PROCEDURE — 3078F DIAST BP <80 MM HG: CPT | Performed by: FAMILY MEDICINE

## 2024-05-23 PROCEDURE — 3008F BODY MASS INDEX DOCD: CPT | Performed by: FAMILY MEDICINE

## 2024-05-23 PROCEDURE — 99213 OFFICE O/P EST LOW 20 MIN: CPT | Performed by: FAMILY MEDICINE

## 2024-05-23 RX ORDER — TADALAFIL 20 MG/1
20 TABLET ORAL AS NEEDED
Qty: 12 TABLET | Refills: 5 | Status: SHIPPED | OUTPATIENT
Start: 2024-05-23

## 2024-05-23 NOTE — PROGRESS NOTES
Blood pressure 114/74, pulse 71, height 5' 10\" (1.778 m), weight 193 lb (87.5 kg).          Patient presents today complaining of pain between the fourth and fifth digits of his feet bilaterally.  Reports that he did not get any relief using topical Lamisil and Lamisil tablets.    Objective nail dystrophy noted left fifth digit of the foot with maceration between the fourth and fifth digits of the feet bilaterally    Assessment maceration interdigital bilaterally due to fungus    Plan referral to podiatry also referral for eye exam blood test in 3 to 4 months follow-up with endocrinology as scheduled

## 2024-06-11 ENCOUNTER — OFFICE VISIT (OUTPATIENT)
Dept: ENDOCRINOLOGY CLINIC | Facility: CLINIC | Age: 59
End: 2024-06-11
Payer: COMMERCIAL

## 2024-06-11 VITALS
HEIGHT: 70 IN | DIASTOLIC BLOOD PRESSURE: 69 MMHG | SYSTOLIC BLOOD PRESSURE: 101 MMHG | HEART RATE: 68 BPM | WEIGHT: 200.19 LBS | BODY MASS INDEX: 28.66 KG/M2

## 2024-06-11 DIAGNOSIS — Z13.9 SCREENING FOR CONDITION: ICD-10-CM

## 2024-06-11 DIAGNOSIS — E78.5 DYSLIPIDEMIA: Primary | ICD-10-CM

## 2024-06-11 DIAGNOSIS — E11.69 TYPE 2 DIABETES MELLITUS WITH OTHER SPECIFIED COMPLICATION, WITHOUT LONG-TERM CURRENT USE OF INSULIN (HCC): ICD-10-CM

## 2024-06-11 LAB
GLUCOSE BLOOD: 90
HEMOGLOBIN A1C: 7.6 % (ref 4.3–5.6)
TEST STRIP LOT #: NORMAL NUMERIC

## 2024-06-11 PROCEDURE — 82947 ASSAY GLUCOSE BLOOD QUANT: CPT | Performed by: INTERNAL MEDICINE

## 2024-06-11 PROCEDURE — 99213 OFFICE O/P EST LOW 20 MIN: CPT | Performed by: INTERNAL MEDICINE

## 2024-06-11 PROCEDURE — 3074F SYST BP LT 130 MM HG: CPT | Performed by: INTERNAL MEDICINE

## 2024-06-11 PROCEDURE — 3078F DIAST BP <80 MM HG: CPT | Performed by: INTERNAL MEDICINE

## 2024-06-11 PROCEDURE — 83036 HEMOGLOBIN GLYCOSYLATED A1C: CPT | Performed by: INTERNAL MEDICINE

## 2024-06-11 PROCEDURE — 3008F BODY MASS INDEX DOCD: CPT | Performed by: INTERNAL MEDICINE

## 2024-06-11 RX ORDER — DULAGLUTIDE 4.5 MG/.5ML
4.5 INJECTION, SOLUTION SUBCUTANEOUS WEEKLY
Qty: 6 ML | Refills: 0 | Status: SHIPPED | OUTPATIENT
Start: 2024-06-11

## 2024-06-11 NOTE — PROGRESS NOTES
Return Office Visit     CHIEF COMPLAINT:    DM   Hypertriglyceridemia    HISTORY OF PRESENT ILLNESS:  Christian Campos is a 58 year old male who presents for follow up for DM.     DM HISTORY:  Diagnosed: Around age 33      HISTORY OF DIABETES COMPLICATIONS: :  History of Retinopathy: yes , last eye exam , will schedule eye exam now  History of Neuropathy: no   History of Nephropathy: no     ASSOCIATED COMPLICATIONS:    HTN: no  Hyperlipidemia: no   Coronary Artery Disease:  No   Cerebrovascular Disease:no       HOME GLUCOSE READINGS:      Fastin-120    No low BG under 70        CURRENT DIABETIC MEDICATIONS INCLUDE:  MTF 1000 mg BID  Trulicity 3 mg q week  Jardiance 25 mg daily    MEALS:  Three meals a day  He has been compliant with a low carb diet    EXERCISE:  Has a dog and walks daily      CURRENT MEDICATION:    Current Outpatient Medications   Medication Sig Dispense Refill    Dulaglutide (TRULICITY) 4.5 MG/0.5ML Subcutaneous Solution Pen-injector Inject 4.5 mg into the skin once a week. 6 mL 0    Tadalafil (CIALIS) 20 MG Oral Tab Take 1 tablet (20 mg total) by mouth as needed for Erectile Dysfunction. 12 tablet 5    Empagliflozin (JARDIANCE) 25 MG Oral Tab Take 1 tablet by mouth daily. 90 tablet 1    omeprazole 20 MG Oral Capsule Delayed Release Take 1 capsule (20 mg total) by mouth every morning before breakfast. (For stomach acid)      colchicine 0.6 MG Oral Tab Take 1 tablet (0.6 mg total) by mouth in the morning, at noon, in the evening, and at bedtime. WHEN NEEDED FOR PAIN STOP ROSUVASTATIN WHILE TAKING COLCHICINE 40 tablet 0    fluticasone-salmeterol 230-21 MCG/ACT Inhalation Aerosol Inhale 2 puffs into the lungs 2 (two) times daily. FOR ASTHMA. 1 YEAR SUPPLY. GENERIC. 1 each 3    metFORMIN HCl 1000 MG Oral Tab Take 1 tablet (1,000 mg total) by mouth 2 (two) times daily with meals. 180 tablet 1    atorvastatin 20 MG Oral Tab Take 1 tablet (20 mg total) by mouth nightly. 90 tablet 0     lisinopril 2.5 MG Oral Tab Take 1 tablet (2.5 mg total) by mouth daily. 90 tablet 3    aspirin 81 MG Oral Chew Tab Chew 1 tablet (81 mg total) by mouth daily. 30 tablet 0         ALLERGY:  No Known Allergies    PAST MEDICAL, SOCIAL AND FAMILY HISTORY:  See past medical history marked as reviewed.  See past surgical history marked as reviewed.  See past family history marked as reviewed.  See past social history marked as reviewed.    ASSESSMENTS:       REVIEW OF SYSTEMS:  Constitutional: Negative for: weight change, fever, fatigue, cold/heat intolerance  Eyes: Negative for:  Visual changes, proptosis, blurring  ENT: Negative for:  dysphagia, neck swelling, dysphonia  Respiratory: Negative for:  dyspnea, cough  Cardiovascular: Negative for:  chest pain, palpitations, orthopnea  GI: Negative for:  abdominal pain, nausea, vomiting, diarrhea, constipation, bleeding  Neurology: Negative for: headache, numbness, weakness  Genito-Urinary: Negative for: dysuria, frequency  Psychiatric: Negative for:  depression, anxiety  Hematology/Lymphatics: Negative for: bruising, lower extremity edema  Endocrine: Negative for: polyuria, polydypsia  Skin: Negative for: rash, blister, cellulitis,         PHYSICAL EXAM:     Vitals reviewed    General Appearance:  alert, well developed, in no acute distress  Head: Atraumatic  Eyes:  normal conjunctivae, sclera., normal sclera and normal pupils  Throat/Neck: normal sound to voice. Normal hearing, normal speech  Respiratory:  Speaking in full sentences, non-labored. no increased work of breathing, no audible wheezing    Neuro: motor grossly intact, moving all extremities without difficulty  Psychiatric:  oriented to time, self, and place  Extremities: 10/2023:  Bilateral barefoot skin diabetic exam is normal, visualized feet and the appearance is normal.  Bilateral monofilament/sensation of both feet is normal.  Pulsation pedal pulse exam of both lower legs/feet is normal as well.    June  2024: callus on 4th toe, better now --> seeing podiatry            DATA:               A1c 7.6 % ( 6/2024)     ASSESSMENT AND PLAN:    1. Type 2 DM:      Plan:  Discussed the pathogenesis, natural course of diabetes. Patient understands the importance of glycemic control and the implications of uncontrolled diabetes including Diabetic ketoacidosis and various micro vascular and macrovascular complications.    a). Medications:      - c/w Metformin 1000 mg BID  No GI side effects.     -  Trulicity 3 --> 4.5  mg q week  No personal or family history of MEN syndrome  Patient counselled regarding side effects including injection site reactions, nausea, vomiting, diarrhea, pancreatitis, gastroparesis and rare side effect abdulkadir Abdirizak syndrome.    -  Jardiance 25 mg daily  Discussed side effects including UTI and fungal infections.  Discussed importance of hydration  Discussed recent FDA data about genital infections  Complete labs ordered by PCP     Check and call with sugars as discussed    b). No nephropathy  C). Instructed on importance of annual eye exams. He has retinopathy, referral to retina specialist provided  d). Foot exam: Daily feet exam explained  e). BG log maintainence explained in great detail, to get log and glucometer on next visit.  f). Life style changes reviewed  g). Hypoglycemia recognition and management discussed    2..Dyslipidemia    A) Discussed lifestyle modifications including reductions in dietary total and saturated fat, weight loss, aerobic exercise, and eating a diet rich in fruits and vegetables.  B) resume atorvastatin. To call if he has any SE  Like muscle cramping  c) Fasting lipid panel  has been ordered  RTC in 3-4 months.   Labs from PCP : to complete fasting  Requesting CBC to be checked --> ordered     To call if he is not able to medication

## 2024-06-12 ENCOUNTER — LAB ENCOUNTER (OUTPATIENT)
Dept: LAB | Age: 59
End: 2024-06-12
Attending: INTERNAL MEDICINE
Payer: COMMERCIAL

## 2024-06-12 ENCOUNTER — TELEPHONE (OUTPATIENT)
Dept: ENDOCRINOLOGY CLINIC | Facility: CLINIC | Age: 59
End: 2024-06-12

## 2024-06-12 DIAGNOSIS — E78.5 DYSLIPIDEMIA: Primary | ICD-10-CM

## 2024-06-12 DIAGNOSIS — Z13.9 SCREENING FOR CONDITION: ICD-10-CM

## 2024-06-12 DIAGNOSIS — E78.5 DYSLIPIDEMIA: ICD-10-CM

## 2024-06-12 DIAGNOSIS — E11.9 DIABETES MELLITUS TYPE 2 IN NONOBESE (HCC): ICD-10-CM

## 2024-06-12 LAB
ALBUMIN SERPL-MCNC: 4.4 G/DL (ref 3.2–4.8)
ALBUMIN/GLOB SERPL: 1.7 {RATIO} (ref 1–2)
ALP LIVER SERPL-CCNC: 69 U/L
ALT SERPL-CCNC: 17 U/L
ANION GAP SERPL CALC-SCNC: 5 MMOL/L (ref 0–18)
AST SERPL-CCNC: 20 U/L (ref ?–34)
BILIRUB SERPL-MCNC: 0.8 MG/DL (ref 0.3–1.2)
BUN BLD-MCNC: 14 MG/DL (ref 9–23)
BUN/CREAT SERPL: 15.7 (ref 10–20)
CALCIUM BLD-MCNC: 9.3 MG/DL (ref 8.7–10.4)
CHLORIDE SERPL-SCNC: 108 MMOL/L (ref 98–112)
CHOLEST SERPL-MCNC: 203 MG/DL (ref ?–200)
CO2 SERPL-SCNC: 26 MMOL/L (ref 21–32)
COMPLEXED PSA SERPL-MCNC: 0.69 NG/ML (ref ?–4)
CREAT BLD-MCNC: 0.89 MG/DL
CREAT UR-SCNC: 104.4 MG/DL
DEPRECATED RDW RBC AUTO: 39.4 FL (ref 35.1–46.3)
EGFRCR SERPLBLD CKD-EPI 2021: 100 ML/MIN/1.73M2 (ref 60–?)
ERYTHROCYTE [DISTWIDTH] IN BLOOD BY AUTOMATED COUNT: 11.6 % (ref 11–15)
FASTING PATIENT LIPID ANSWER: YES
FASTING STATUS PATIENT QL REPORTED: YES
GLOBULIN PLAS-MCNC: 2.6 G/DL (ref 2–3.5)
GLUCOSE BLD-MCNC: 115 MG/DL (ref 70–99)
HCT VFR BLD AUTO: 46 %
HDLC SERPL-MCNC: 40 MG/DL (ref 40–59)
HGB BLD-MCNC: 16.1 G/DL
LDLC SERPL CALC-MCNC: 79 MG/DL (ref ?–100)
MCH RBC QN AUTO: 32.8 PG (ref 26–34)
MCHC RBC AUTO-ENTMCNC: 35 G/DL (ref 31–37)
MCV RBC AUTO: 93.7 FL
MICROALBUMIN UR-MCNC: <0.3 MG/DL
NONHDLC SERPL-MCNC: 163 MG/DL (ref ?–130)
OSMOLALITY SERPL CALC.SUM OF ELEC: 289 MOSM/KG (ref 275–295)
PLATELET # BLD AUTO: 263 10(3)UL (ref 150–450)
POTASSIUM SERPL-SCNC: 4.7 MMOL/L (ref 3.5–5.1)
PROT SERPL-MCNC: 7 G/DL (ref 5.7–8.2)
RBC # BLD AUTO: 4.91 X10(6)UL
SODIUM SERPL-SCNC: 139 MMOL/L (ref 136–145)
TRIGL SERPL-MCNC: 525 MG/DL (ref 30–149)
TSI SER-ACNC: 1.58 MIU/ML (ref 0.55–4.78)
VLDLC SERPL CALC-MCNC: 84 MG/DL (ref 0–30)
WBC # BLD AUTO: 6 X10(3) UL (ref 4–11)

## 2024-06-12 PROCEDURE — 84443 ASSAY THYROID STIM HORMONE: CPT

## 2024-06-12 PROCEDURE — 80061 LIPID PANEL: CPT

## 2024-06-12 PROCEDURE — 82570 ASSAY OF URINE CREATININE: CPT

## 2024-06-12 PROCEDURE — 36415 COLL VENOUS BLD VENIPUNCTURE: CPT

## 2024-06-12 PROCEDURE — 82043 UR ALBUMIN QUANTITATIVE: CPT

## 2024-06-12 PROCEDURE — 80053 COMPREHEN METABOLIC PANEL: CPT

## 2024-06-12 PROCEDURE — 85027 COMPLETE CBC AUTOMATED: CPT

## 2024-06-12 RX ORDER — FENOFIBRATE 134 MG/1
CAPSULE ORAL
Qty: 30 CAPSULE | Refills: 0 | Status: SHIPPED | OUTPATIENT
Start: 2024-06-12

## 2024-06-12 NOTE — TELEPHONE ENCOUNTER
KARNYA Reynolds    Spoke to patient. He reports fasting since 6 pm the day before labs were completed.     RN reviewed recommendations and risk for pancreatitis stated below.    Patient understands new medication benefits and side effects. Medication information sent via [facility] in Polish for patient to review.     RN communicated to patient to repeat lipid panel in 6 weeks around 7/24/24.    Rx sent  Lipid panel ordered to be completed in 6 weeks.

## 2024-06-12 NOTE — TELEPHONE ENCOUNTER
TG are very high   Was he fasting ?   If no, please repeat after fasting fr 8-10 hours now    If he was fasting , please start   Fenofibrate 134 mg daily     Please review risk of pancreatitis with Tg over 500, please review symptims  Low fat diet   Decrease/ stop alcohol consumption  Exercise daily   Fasting lipid panel in 6 weeks    Calcium, GFR, thyroid labs normal   Blood counts normal     Thanks

## 2024-06-13 ENCOUNTER — TELEPHONE (OUTPATIENT)
Dept: ENDOCRINOLOGY CLINIC | Facility: CLINIC | Age: 59
End: 2024-06-13

## 2024-06-13 NOTE — TELEPHONE ENCOUNTER
90 Day Prescription Request    Drug:Fenofibrate 134 mg capsules    SIG:take 1 capsule by mouth daily    Original QTY:30  Quantity requested:90

## 2024-06-20 ENCOUNTER — TELEPHONE (OUTPATIENT)
Dept: ENDOCRINOLOGY CLINIC | Facility: CLINIC | Age: 59
End: 2024-06-20

## 2024-06-20 NOTE — TELEPHONE ENCOUNTER
Prior Authorization needed      Plan does not cover this medication.  Please call plan at to initiate prior authorization or call/fax pharmacy to change medication.   Current Outpatient Medications   Medication Sig Dispense Refill           Dulaglutide (TRULICITY) 4.5 MG/0.5ML Subcutaneous Solution Pen-injector Inject 4.5 mg into the skin once a week. 6 mL 0

## 2024-06-20 NOTE — TELEPHONE ENCOUNTER
Per TE 1/2524: [employer] BS appeals department calling to inform that the Trulicity medication has been approved effective date of 1/25/2024 for 1 year-12 months. Reference # [id number].     Spoke with [facility] regarding Trulicity and no PA is required.

## 2024-07-20 NOTE — TELEPHONE ENCOUNTER
Endocrine refill protocol for lipid lowering medications      Protocol Criteria:  Appointment with Endocrinology completed in the last 6 months or scheduled in the next 3 months     Verify appointment has been completed or scheduled in the appropriate timeline. If so can send a 90 day supply with 1 refill.   Lipid panel must have been completed in the last 12 months   ALT result <80  LDL result <130    Cholesterol: 195, done on 8/14/2023.  HDL Cholesterol: 56, done on 8/14/2023.  LDL Cholesterol: 124, done on 8/14/2023.  TriGlycerides 82, done on 8/14/2023.     Last completed office visit: 6/11/24    Next scheduled follow up: 10/8/24 - Called and spoke to patient, appointment scheduled for first available in October. While on the phone with patient he informed me that he is also in need of a refill for Jardiance. Rx pending below.     Last Lipid panel date: 6/12/24    Last ALT result:   Component      Latest Ref Rng 6/12/2024   ALT (SGPT)      10 - 49 U/L 17      Last LDL result:   Component      Latest Ref Rng 6/12/2024   LDL Cholesterol Calc      <100 mg/dL 79

## 2024-07-22 RX ORDER — EMPAGLIFLOZIN 25 MG/1
1 TABLET, FILM COATED ORAL DAILY
Qty: 90 TABLET | Refills: 1 | Status: SHIPPED | OUTPATIENT
Start: 2024-07-22

## 2024-07-22 RX ORDER — FENOFIBRATE 134 MG/1
CAPSULE ORAL
Qty: 90 CAPSULE | Refills: 0 | Status: SHIPPED | OUTPATIENT
Start: 2024-07-22

## 2024-07-29 NOTE — TELEPHONE ENCOUNTER
Current Outpatient Medications    metFORMIN HCl 1000 MG Oral Tab Take 1 tablet (1,000 mg total) by mouth 2 (two) times daily with meals. 180 tablet 1

## 2024-07-29 NOTE — TELEPHONE ENCOUNTER
Endocrine Refill protocol for metformin    Protocol Criteria:Passed    -Appointment with Endocrinology completed in the last 6 months or scheduled in the next 3 months    -GFR greater than or equal to 40 in the past 12 months     -A1c result below 8.5% in the past 6 months      Verify the above has been completed or scheduled in the appropriate timeline. If so can send a 90 day supply with 1 refill.     Last completed office visit:6/11/2024 Gilma Turner MD   Next scheduled Follow up:   Future Appointments   Date Time Provider Department Center   8/6/2024  4:10 PM Gilma Gunn DPM [facility]   10/8/2024  3:45 PM Gilma Turner MD [facility]       Last GFR result:       Last A1c result:  Lab Results   Component Value Date     (H) 10/02/2023    A1C 7.6 (A) 06/11/2024

## 2024-08-06 ENCOUNTER — OFFICE VISIT (OUTPATIENT)
Dept: PODIATRY CLINIC | Facility: CLINIC | Age: 59
End: 2024-08-06
Payer: COMMERCIAL

## 2024-08-06 DIAGNOSIS — B35.3 TINEA PEDIS OF BOTH FEET: Primary | ICD-10-CM

## 2024-08-06 PROCEDURE — 99203 OFFICE O/P NEW LOW 30 MIN: CPT | Performed by: STUDENT IN AN ORGANIZED HEALTH CARE EDUCATION/TRAINING PROGRAM

## 2024-08-06 RX ORDER — CLOTRIMAZOLE AND BETAMETHASONE DIPROPIONATE 10; .64 MG/G; MG/G
1 CREAM TOPICAL 2 TIMES DAILY
Qty: 45 G | Refills: 3 | Status: SHIPPED | OUTPATIENT
Start: 2024-08-06

## 2024-08-06 NOTE — PROGRESS NOTES
[facility] Podiatry  Progress Note      Christian Campos is a 59 year old male.   Chief Complaint   Patient presents with    Diabetic Foot Care     Consult  - DFC-Pt states callus in between toes in both feet. Pt trims his own nails. No numbness or tingling.  A1C- 7.6 on 6/11.             HPI:     Patient is a pleasant 59-year-old male who presents to clinic for itchiness and dry skin to bilateral fourth interspaces.  Denies any other pedal complaints.      Allergies: Patient has no known allergies.    Current Outpatient Medications   Medication Sig Dispense Refill    clotrimazole-betamethasone 1-0.05 % External Cream Apply 1 Application topically 2 (two) times daily. 45 g 3    metFORMIN HCl 1000 MG Oral Tab Take 1 tablet (1,000 mg total) by mouth 2 (two) times daily with meals. 180 tablet 1    Fenofibrate 134 MG Oral Cap TAKE 1 CAPSULE BY MOUTH DAILY 90 capsule 0    Empagliflozin (JARDIANCE) 25 MG Oral Tab Take 1 tablet by mouth daily. 90 tablet 1    Dulaglutide (TRULICITY) 4.5 MG/0.5ML Subcutaneous Solution Pen-injector Inject 4.5 mg into the skin once a week. 6 mL 0    Tadalafil (CIALIS) 20 MG Oral Tab Take 1 tablet (20 mg total) by mouth as needed for Erectile Dysfunction. 12 tablet 5    omeprazole 20 MG Oral Capsule Delayed Release Take 1 capsule (20 mg total) by mouth every morning before breakfast. (For stomach acid)      colchicine 0.6 MG Oral Tab Take 1 tablet (0.6 mg total) by mouth in the morning, at noon, in the evening, and at bedtime. WHEN NEEDED FOR PAIN STOP ROSUVASTATIN WHILE TAKING COLCHICINE 40 tablet 0    fluticasone-salmeterol 230-21 MCG/ACT Inhalation Aerosol Inhale 2 puffs into the lungs 2 (two) times daily. FOR ASTHMA. 1 YEAR SUPPLY. GENERIC. 1 each 3    atorvastatin 20 MG Oral Tab Take 1 tablet (20 mg total) by mouth nightly. 90 tablet 0    lisinopril 2.5 MG Oral Tab Take 1 tablet (2.5 mg total) by mouth daily. 90 tablet 3    aspirin 81 MG Oral Chew Tab Chew 1 tablet (81 mg total) by mouth  daily. 30 tablet 0      Past Medical History:    Back problem    Diabetes mellitus (HCC)    High blood pressure    High cholesterol    Type II or unspecified type diabetes mellitus without mention of complication, not stated as uncontrolled      Past Surgical History:   Procedure Laterality Date    Colonoscopy N/A 2017    Procedure: COLONOSCOPY;  Surgeon: Kirill Dwyer MD;  [facility] ENDOSCOPY REPEAT       Family History   Problem Relation Age of Onset    Diabetes Father     Cancer Mother         stomach cancer at 83      Social History     Socioeconomic History    Marital status:    Tobacco Use    Smoking status: Former     Current packs/day: 0.00     Types: Cigarettes     Quit date: 2005     Years since quittin.2     Passive exposure: Past    Smokeless tobacco: Never   Vaping Use    Vaping status: Never Used   Substance and Sexual Activity    Alcohol use: Yes     Alcohol/week: 12.0 standard drinks of alcohol     Types: 12 Cans of beer per week     Comment: WEEKENDS     Drug use: No   Other Topics Concern    Caffeine Concern Yes     Comment: Coffee: 1 cup daily    Exercise No           REVIEW OF SYSTEMS:     Denies nause, fever, chills  No calf pain  Denies chest pain or SOB      EXAM:   There were no vitals taken for this visit.  GENERAL: well developed, well nourished, in no apparent distress  EXTREMITIES:   1. Integument: Normal skin temperature and turgor.  Scaly and dry skin to bilateral fourth interspace  2. Vascular: Dorsalis pedis two out of four bilateral and posterior tibial pulses two out of   four bilateral, capillary refill normal.   3. Musculoskeletal: All muscle groups are graded 5 out of 5 in the foot and ankle.   4. Neurological: Normal sharp dull sensation; reflexes normal.  Bilateral barefoot skin diabetic exam is normal, visualized feet and the appearance is normal.  Bilateral monofilament/sensation of both feet is normal.  Pulsation pedal pulse exam of both  lower legs/feet is normal as well.               ASSESSMENT AND PLAN:   Diagnoses and all orders for this visit:    Tinea pedis of both feet    Other orders  -     clotrimazole-betamethasone 1-0.05 % External Cream; Apply 1 Application topically 2 (two) times daily.        Plan:       -Patient examined, chart history reviewed.  -Discussed importance of proper pedal hygiene, regular foot checks, and tight glucose control.  -rx clotrimazole with betamethasone to be applied to digits as instructed  -Ambulate with supportive shoes and inserts and avoid walking barefoot.  -Educated patient on acute signs of infection advised patient to seek immediate medical attention if symptoms arise.    RTC in 1 month       The patient indicates understanding of these issues and agrees to the plan.        Gilma Gunn DPM

## 2024-09-05 ENCOUNTER — PATIENT MESSAGE (OUTPATIENT)
Dept: ENDOCRINOLOGY CLINIC | Facility: CLINIC | Age: 59
End: 2024-09-05

## 2024-09-05 RX ORDER — DULAGLUTIDE 4.5 MG/.5ML
4.5 INJECTION, SOLUTION SUBCUTANEOUS WEEKLY
Qty: 6 ML | Refills: 1 | Status: SHIPPED | OUTPATIENT
Start: 2024-09-05

## 2024-09-05 NOTE — TELEPHONE ENCOUNTER
From: Christian Campos  To: Gilma Turner  Sent: 9/5/2024 8:52 AM CDT  Subject: Medicina     Need refill trulicity 4.5 puede oh sewell

## 2024-09-19 ENCOUNTER — HOSPITAL ENCOUNTER (OUTPATIENT)
Dept: GENERAL RADIOLOGY | Age: 59
Discharge: HOME | End: 2024-09-19
Attending: FAMILY MEDICINE
Payer: COMMERCIAL

## 2024-09-19 ENCOUNTER — OFFICE VISIT (OUTPATIENT)
Dept: FAMILY MEDICINE CLINIC | Facility: CLINIC | Age: 59
End: 2024-09-19
Payer: COMMERCIAL

## 2024-09-19 VITALS
HEART RATE: 76 BPM | DIASTOLIC BLOOD PRESSURE: 71 MMHG | RESPIRATION RATE: 17 BRPM | BODY MASS INDEX: 27.92 KG/M2 | WEIGHT: 195 LBS | HEIGHT: 70 IN | SYSTOLIC BLOOD PRESSURE: 112 MMHG

## 2024-09-19 DIAGNOSIS — N52.8 OTHER MALE ERECTILE DYSFUNCTION: ICD-10-CM

## 2024-09-19 DIAGNOSIS — M25.511 ACUTE PAIN OF RIGHT SHOULDER: ICD-10-CM

## 2024-09-19 DIAGNOSIS — E11.9 DIABETES MELLITUS TYPE 2 IN NONOBESE (HCC): Primary | ICD-10-CM

## 2024-09-19 LAB
GLUCOSE BLOOD: 149
TEST STRIP LOT #: NORMAL NUMERIC

## 2024-09-19 PROCEDURE — 3051F HG A1C>EQUAL 7.0%<8.0%: CPT | Performed by: FAMILY MEDICINE

## 2024-09-19 PROCEDURE — 82947 ASSAY GLUCOSE BLOOD QUANT: CPT | Performed by: FAMILY MEDICINE

## 2024-09-19 PROCEDURE — 73030 X-RAY EXAM OF SHOULDER: CPT | Performed by: FAMILY MEDICINE

## 2024-09-19 PROCEDURE — 3008F BODY MASS INDEX DOCD: CPT | Performed by: FAMILY MEDICINE

## 2024-09-19 PROCEDURE — 3078F DIAST BP <80 MM HG: CPT | Performed by: FAMILY MEDICINE

## 2024-09-19 PROCEDURE — 3061F NEG MICROALBUMINURIA REV: CPT | Performed by: FAMILY MEDICINE

## 2024-09-19 PROCEDURE — 99213 OFFICE O/P EST LOW 20 MIN: CPT | Performed by: FAMILY MEDICINE

## 2024-09-19 PROCEDURE — 3074F SYST BP LT 130 MM HG: CPT | Performed by: FAMILY MEDICINE

## 2024-09-19 RX ORDER — TADALAFIL 20 MG/1
20 TABLET ORAL AS NEEDED
Qty: 12 TABLET | Refills: 5 | Status: SHIPPED | OUTPATIENT
Start: 2024-09-19

## 2024-09-19 NOTE — PROCEDURES
Informed consent obtained all questions answered for corticosteroid injection of right shoulder    Kenalog 40 mg/ML x 1 mL with lidocaine 1% x 4 mL    Patient tolerated well

## 2024-09-19 NOTE — PROGRESS NOTES
Blood pressure 112/71, pulse 76, resp. rate 17, height 5' 10\" (1.778 m), weight 195 lb (88.5 kg).      Right shoulder pain while throwing a ball with his son.  Also reports he has right upper back pain.  Works as a  using his arms often.  No trauma.  No history of surgery of the shoulder.  Reports that he otherwise feels well would like Cialis for erectile dysfunction.    Objective    Right shoulder with positive Toño sign good range of motion although there was discomfort in abduction    Negative for impingement    Negative bucket-handle test    Small umbilical hernia noted on exam    Assessment #1 rotator cuff strain #2 erectile dysfunction #3 diabetes    Plan #1 corticosteroid injection #2 Cialis refill #3 blood sugar today 149 prior to injection advised patient to check his sugar again tonight and then tomorrow morning at home    Reprinted order for dilated eye exam    Follow-up with endocrinology as scheduled

## 2024-10-08 ENCOUNTER — OFFICE VISIT (OUTPATIENT)
Dept: ENDOCRINOLOGY CLINIC | Facility: CLINIC | Age: 59
End: 2024-10-08

## 2024-10-08 VITALS
HEIGHT: 70 IN | DIASTOLIC BLOOD PRESSURE: 74 MMHG | BODY MASS INDEX: 27.54 KG/M2 | SYSTOLIC BLOOD PRESSURE: 119 MMHG | WEIGHT: 192.38 LBS | HEART RATE: 71 BPM

## 2024-10-08 DIAGNOSIS — E78.5 DYSLIPIDEMIA: ICD-10-CM

## 2024-10-08 DIAGNOSIS — E11.65 TYPE 2 DIABETES MELLITUS WITH HYPERGLYCEMIA, WITHOUT LONG-TERM CURRENT USE OF INSULIN (HCC): Primary | ICD-10-CM

## 2024-10-08 LAB
GLUCOSE BLOOD: 99
HEMOGLOBIN A1C: 7.7 % (ref 4.3–5.6)
TEST STRIP LOT #: NORMAL NUMERIC

## 2024-10-08 PROCEDURE — 3008F BODY MASS INDEX DOCD: CPT | Performed by: INTERNAL MEDICINE

## 2024-10-08 PROCEDURE — 3051F HG A1C>EQUAL 7.0%<8.0%: CPT | Performed by: INTERNAL MEDICINE

## 2024-10-08 PROCEDURE — 82947 ASSAY GLUCOSE BLOOD QUANT: CPT | Performed by: INTERNAL MEDICINE

## 2024-10-08 PROCEDURE — 99214 OFFICE O/P EST MOD 30 MIN: CPT | Performed by: INTERNAL MEDICINE

## 2024-10-08 PROCEDURE — 83036 HEMOGLOBIN GLYCOSYLATED A1C: CPT | Performed by: INTERNAL MEDICINE

## 2024-10-08 PROCEDURE — 3078F DIAST BP <80 MM HG: CPT | Performed by: INTERNAL MEDICINE

## 2024-10-08 PROCEDURE — 3074F SYST BP LT 130 MM HG: CPT | Performed by: INTERNAL MEDICINE

## 2024-10-08 NOTE — PROGRESS NOTES
Return Office Visit     CHIEF COMPLAINT:    DM   Hypertriglyceridemia    HISTORY OF PRESENT ILLNESS:  Christian Campos is a 59 year old male who presents for follow up for DM.     DM HISTORY:  Diagnosed: Around age 33      HISTORY OF DIABETES COMPLICATIONS: :  History of Retinopathy: yes , last eye exam , will schedule eye exam now  History of Neuropathy: no   History of Nephropathy: no     ASSOCIATED COMPLICATIONS:    HTN: no  Hyperlipidemia: no   Coronary Artery Disease:  No   Cerebrovascular Disease:no       HOME GLUCOSE READINGS:      Fastin-120    No low BG under 70        CURRENT DIABETIC MEDICATIONS INCLUDE:  MTF 1000 mg BID  Trulicity 3 mg q week  Jardiance 25 mg daily    MEALS:  Three meals a day  He has been compliant with a low carb diet    EXERCISE:  Has a dog and walks daily      CURRENT MEDICATION:    Current Outpatient Medications   Medication Sig Dispense Refill    semaglutide 2 MG/3ML Subcutaneous Solution Pen-injector Inject 0.25 mg into the skin once a week for 14 days, THEN 0.5 mg once a week. 9 mL 0    Tadalafil (CIALIS) 20 MG Oral Tab Take 1 tablet (20 mg total) by mouth as needed for Erectile Dysfunction. 12 tablet 5    clotrimazole-betamethasone 1-0.05 % External Cream Apply 1 Application topically 2 (two) times daily. 45 g 3    metFORMIN HCl 1000 MG Oral Tab Take 1 tablet (1,000 mg total) by mouth 2 (two) times daily with meals. 180 tablet 1    Fenofibrate 134 MG Oral Cap TAKE 1 CAPSULE BY MOUTH DAILY 90 capsule 0    Empagliflozin (JARDIANCE) 25 MG Oral Tab Take 1 tablet by mouth daily. 90 tablet 1    omeprazole 20 MG Oral Capsule Delayed Release Take 1 capsule (20 mg total) by mouth every morning before breakfast. (For stomach acid)      colchicine 0.6 MG Oral Tab Take 1 tablet (0.6 mg total) by mouth in the morning, at noon, in the evening, and at bedtime. WHEN NEEDED FOR PAIN STOP ROSUVASTATIN WHILE TAKING COLCHICINE 40 tablet 0    fluticasone-salmeterol 230-21 MCG/ACT  Inhalation Aerosol Inhale 2 puffs into the lungs 2 (two) times daily. FOR ASTHMA. 1 YEAR SUPPLY. GENERIC. 1 each 3    atorvastatin 20 MG Oral Tab Take 1 tablet (20 mg total) by mouth nightly. 90 tablet 0    lisinopril 2.5 MG Oral Tab Take 1 tablet (2.5 mg total) by mouth daily. 90 tablet 3    aspirin 81 MG Oral Chew Tab Chew 1 tablet (81 mg total) by mouth daily. 30 tablet 0         ALLERGY:  No Known Allergies    PAST MEDICAL, SOCIAL AND FAMILY HISTORY:  See past medical history marked as reviewed.  See past surgical history marked as reviewed.  See past family history marked as reviewed.  See past social history marked as reviewed.    ASSESSMENTS:       REVIEW OF SYSTEMS:  Constitutional: Negative for: weight change, fever, fatigue, cold/heat intolerance  Eyes: Negative for:  Visual changes, proptosis, blurring  ENT: Negative for:  dysphagia, neck swelling, dysphonia  Respiratory: Negative for:  dyspnea, cough  Cardiovascular: Negative for:  chest pain, palpitations, orthopnea  GI: Negative for:  abdominal pain, nausea, vomiting, diarrhea, constipation, bleeding  Neurology: Negative for: headache, numbness, weakness  Genito-Urinary: Negative for: dysuria, frequency  Psychiatric: Negative for:  depression, anxiety  Hematology/Lymphatics: Negative for: bruising, lower extremity edema  Endocrine: Negative for: polyuria, polydypsia  Skin: Negative for: rash, blister, cellulitis,         PHYSICAL EXAM:     Vitals reviewed    General Appearance:  alert, well developed, in no acute distress  Head: Atraumatic  Eyes:  normal conjunctivae, sclera., normal sclera and normal pupils  Throat/Neck: normal sound to voice. Normal hearing, normal speech  Respiratory:  Speaking in full sentences, non-labored. no increased work of breathing, no audible wheezing    Neuro: motor grossly intact, moving all extremities without difficulty  Psychiatric:  oriented to time, self, and place  Extremities: 10/2024:  Bilateral barefoot skin  diabetic exam is normal, visualized feet and the appearance is normal except callus/ lesion on toes --> podiatrist is on long term leave, referral to another podiatrist provided  Bilateral monofilament/sensation of both feet is normal.  Pulsation pedal pulse exam of both lower legs/feet is normal as well.              DATA:           A1c 7.7 % ( 10/2024)     ASSESSMENT AND PLAN:    1. Type 2 DM:      Plan:  Discussed the pathogenesis, natural course of diabetes. Patient understands the importance of glycemic control and the implications of uncontrolled diabetes including Diabetic ketoacidosis and various micro vascular and macrovascular complications.    a). Medications:      - c/w Metformin 1000 mg BID  No GI side effects.     -  Stop trulicity   START ozempic 0.25 mg weekly x 14 days followed by 0.5 mg weekly   No personal or family history of MEN syndrome  Patient counselled regarding side effects including injection site reactions, nausea, vomiting, diarrhea, pancreatitis, gastroparesis and rare side effect abdulkadir Abdirizak syndrome.    -  Jardiance 25 mg daily  Discussed side effects including UTI and fungal infections.  Discussed importance of hydration  Discussed recent FDA data about genital infections  Complete labs ordered by PCP     Check and call with sugars as discussed    b). No nephropathy  C). Instructed on importance of annual eye exams. He has retinopathy, referral to retina specialist provided  d). Foot exam: Daily feet exam explained  e). BG log maintainence explained in great detail, to get log and glucometer on next visit.  f). Life style changes reviewed  g). Hypoglycemia recognition and management discussed    2..Dyslipidemia    A) Discussed lifestyle modifications including reductions in dietary total and saturated fat, weight loss, aerobic exercise, and eating a diet rich in fruits and vegetables.  B)  on statin and fenofibrate. To call if he has any SE  Like muscle cramping  c) Fasting lipid  panel  has been ordered  RTC in 3-4 months.   Labs from PCP : to complete fasting

## 2024-10-09 ENCOUNTER — TELEPHONE (OUTPATIENT)
Dept: ORTHOPEDICS CLINIC | Facility: CLINIC | Age: 59
End: 2024-10-09

## 2024-10-09 DIAGNOSIS — M79.671 RIGHT FOOT PAIN: ICD-10-CM

## 2024-10-09 DIAGNOSIS — M79.672 LEFT FOOT PAIN: Primary | ICD-10-CM

## 2024-10-09 NOTE — TELEPHONE ENCOUNTER
Patient called for [facility]. Please advise for xrays   Future Appointments   Date Time Provider Department Center   11/8/2024 11:30 AM Rosey Sandoval DPM [facility] [place]

## 2024-10-09 NOTE — TELEPHONE ENCOUNTER
XR ordered per ortho protocol. XR scheduled and patient was notified via [employer] to let them know that they should arrive 15-20 minutes early, in order for them to complete imaging.

## 2024-10-22 ENCOUNTER — OFFICE VISIT (OUTPATIENT)
Dept: FAMILY MEDICINE CLINIC | Facility: CLINIC | Age: 59
End: 2024-10-22
Payer: COMMERCIAL

## 2024-10-22 ENCOUNTER — IMMUNIZATION (OUTPATIENT)
Dept: FAMILY MEDICINE CLINIC | Facility: CLINIC | Age: 59
End: 2024-10-22
Payer: COMMERCIAL

## 2024-10-22 DIAGNOSIS — Z23 NEED FOR SHINGLES VACCINE: Primary | ICD-10-CM

## 2024-10-22 DIAGNOSIS — Z23 NEED FOR INFLUENZA VACCINATION: Primary | ICD-10-CM

## 2024-10-22 PROCEDURE — 90472 IMMUNIZATION ADMIN EACH ADD: CPT | Performed by: PHYSICIAN ASSISTANT

## 2024-10-22 PROCEDURE — 90656 IIV3 VACC NO PRSV 0.5 ML IM: CPT | Performed by: PHYSICIAN ASSISTANT

## 2024-10-22 PROCEDURE — 90750 HZV VACC RECOMBINANT IM: CPT | Performed by: PHYSICIAN ASSISTANT

## 2024-10-22 PROCEDURE — 90471 IMMUNIZATION ADMIN: CPT | Performed by: PHYSICIAN ASSISTANT

## 2024-11-08 ENCOUNTER — HOSPITAL ENCOUNTER (OUTPATIENT)
Dept: GENERAL RADIOLOGY | Age: 59
Discharge: HOME | End: 2024-11-08
Attending: PODIATRIST
Payer: COMMERCIAL

## 2024-11-08 ENCOUNTER — OFFICE VISIT (OUTPATIENT)
Dept: ORTHOPEDICS CLINIC | Facility: CLINIC | Age: 59
End: 2024-11-08
Payer: COMMERCIAL

## 2024-11-08 VITALS — WEIGHT: 192 LBS | BODY MASS INDEX: 27.49 KG/M2 | HEIGHT: 70 IN

## 2024-11-08 DIAGNOSIS — E11.9 TYPE 2 DIABETES MELLITUS WITHOUT COMPLICATION, WITHOUT LONG-TERM CURRENT USE OF INSULIN (HCC): ICD-10-CM

## 2024-11-08 DIAGNOSIS — M20.42 HAMMER TOES OF BOTH FEET: Primary | ICD-10-CM

## 2024-11-08 DIAGNOSIS — M79.671 RIGHT FOOT PAIN: ICD-10-CM

## 2024-11-08 DIAGNOSIS — M20.41 HAMMER TOES OF BOTH FEET: Primary | ICD-10-CM

## 2024-11-08 DIAGNOSIS — L84 CORNS OF MULTIPLE TOES: ICD-10-CM

## 2024-11-08 DIAGNOSIS — M79.672 LEFT FOOT PAIN: ICD-10-CM

## 2024-11-08 PROCEDURE — 99203 OFFICE O/P NEW LOW 30 MIN: CPT | Performed by: PODIATRIST

## 2024-11-08 PROCEDURE — 3008F BODY MASS INDEX DOCD: CPT | Performed by: PODIATRIST

## 2024-11-08 PROCEDURE — 73630 X-RAY EXAM OF FOOT: CPT | Performed by: PODIATRIST

## 2024-11-11 NOTE — PROGRESS NOTES
[facility] New Patient Note    CC:   Chief Complaint   Patient presents with    Foot Pain     Bilateral foot pain  -Has callus on both feet, saw podiatrist a while ago and was prescribed an ointment that did not help       HPI: The patient is a 59 year old male who presents today with complaints of painful corns involving several toes both feet  This has been for some time  Saw Dr Lara in past   Clotrimazole cream for tinea used            Past Medical History:    Back problem    Diabetes mellitus (HCC)    High blood pressure    High cholesterol    Type II or unspecified type diabetes mellitus without mention of complication, not stated as uncontrolled     Past Surgical History:   Procedure Laterality Date    Colonoscopy N/A 5/12/2017    Procedure: COLONOSCOPY;  Surgeon: Kirill Dwyer MD;  Location: [facility] ENDOSCOPY REPEAT 2027     Current Outpatient Medications   Medication Sig Dispense Refill    semaglutide 2 MG/3ML Subcutaneous Solution Pen-injector Inject 0.25 mg into the skin once a week for 14 days, THEN 0.5 mg once a week. 9 mL 0    Tadalafil (CIALIS) 20 MG Oral Tab Take 1 tablet (20 mg total) by mouth as needed for Erectile Dysfunction. 12 tablet 5    clotrimazole-betamethasone 1-0.05 % External Cream Apply 1 Application topically 2 (two) times daily. 45 g 3    metFORMIN HCl 1000 MG Oral Tab Take 1 tablet (1,000 mg total) by mouth 2 (two) times daily with meals. 180 tablet 1    Fenofibrate 134 MG Oral Cap TAKE 1 CAPSULE BY MOUTH DAILY 90 capsule 0    Empagliflozin (JARDIANCE) 25 MG Oral Tab Take 1 tablet by mouth daily. 90 tablet 1    omeprazole 20 MG Oral Capsule Delayed Release Take 1 capsule (20 mg total) by mouth every morning before breakfast. (For stomach acid)      colchicine 0.6 MG Oral Tab Take 1 tablet (0.6 mg total) by mouth in the morning, at noon, in the evening, and at bedtime. WHEN NEEDED FOR PAIN STOP ROSUVASTATIN WHILE TAKING COLCHICINE 40 tablet 0    fluticasone-salmeterol  230-21 MCG/ACT Inhalation Aerosol Inhale 2 puffs into the lungs 2 (two) times daily. FOR ASTHMA. 1 YEAR SUPPLY. GENERIC. 1 each 3    atorvastatin 20 MG Oral Tab Take 1 tablet (20 mg total) by mouth nightly. 90 tablet 0    lisinopril 2.5 MG Oral Tab Take 1 tablet (2.5 mg total) by mouth daily. 90 tablet 3    aspirin 81 MG Oral Chew Tab Chew 1 tablet (81 mg total) by mouth daily. 30 tablet 0     Allergies[1]  Family History   Problem Relation Age of Onset    Diabetes Father     Cancer Mother         stomach cancer at 83     Social History     Occupational History    Not on file   Tobacco Use    Smoking status: Former     Current packs/day: 0.00     Types: Cigarettes     Quit date: 2005     Years since quittin.5     Passive exposure: Past    Smokeless tobacco: Never   Vaping Use    Vaping status: Never Used   Substance and Sexual Activity    Alcohol use: Yes     Alcohol/week: 12.0 standard drinks of alcohol     Types: 12 Cans of beer per week     Comment: WEEKENDS     Drug use: No    Sexual activity: Not on file        ROS:  Complete ROS reviewed by me and non-contributory to the chief complaint except as mentioned above.    Physical Exam:    Ht 5' 10\" (1.778 m)   Wt 192 lb (87.1 kg)   BMI 27.55 kg/m²     Bilateral feet  Mild hammertoes and varus hammertoes  Interdigital corns noted bilateral    Sensation is intact sharp versus dull.  She can feel light touch to the tips of the toes  Palpable pedal pulses.  Hair growth is present.  Skin is supple and feet are well perfused and warm.    Strength is 5 out of 5 all muscle groups    Full range of motion and nonpainful motion of the ankle joint, subtalar joint, MP joints, and midfoot joints.     Imaging:  normal xrays, mild hammertoes noted   personally viewed, independently interpreted and radiology report read.      Assessment/Diagnoses:  Diagnoses and all orders for this visit:    Hammer toes of both feet    Corns of multiple toes    Type 2 diabetes mellitus  without complication, without long-term current use of insulin (HCC)        Plan:  I reviewed imaging and exam findings with the patient.  We looked at his xrays and discussed what hammertoes are and how corns develop    Shoes  Spacing, padding  Trimming corns    Eventual arthroplasty or fusion - toes would be surgical options    Rx compounding cream - for corns    Follow up prn      Rosey Sandoval, DPMPodiatric Surgery  FACSt. Michaels Medical Center [facility]Or[facility]oped[facility]  [address], [address], [place] [id number] [address] [place] [address] [email or web address]  [email or web address]  t: [phone]   f: [phone]              This document was partially prepared using Dragon Medical voice recognition software.          [1] No Known Allergies

## 2024-11-25 ENCOUNTER — OFFICE VISIT (OUTPATIENT)
Dept: FAMILY MEDICINE CLINIC | Facility: CLINIC | Age: 59
End: 2024-11-25
Payer: COMMERCIAL

## 2024-11-25 DIAGNOSIS — Z23 NEED FOR SHINGLES VACCINE: Primary | ICD-10-CM

## 2024-11-25 PROCEDURE — 90750 HZV VACC RECOMBINANT IM: CPT | Performed by: NURSE PRACTITIONER

## 2024-11-25 PROCEDURE — 90471 IMMUNIZATION ADMIN: CPT | Performed by: NURSE PRACTITIONER

## 2024-11-25 NOTE — PROGRESS NOTES
Patient presents for second dose shingrix. Questionnaire complete. Administered. Patient tolerated well. No q/c.

## 2025-02-06 RX ORDER — SEMAGLUTIDE 0.68 MG/ML
0.25 INJECTION, SOLUTION SUBCUTANEOUS WEEKLY
Qty: 9 ML | Refills: 0 | Status: SHIPPED | OUTPATIENT
Start: 2025-02-06

## 2025-02-06 NOTE — TELEPHONE ENCOUNTER
Endocrine Refill protocol for oral and injectable diabetic medications    Protocol Criteria:  PASSED  Reason: N/A    If all below requirements are met, send a 90-day supply with 1 refill per provider protocol.    Verify appointment with Endocrinology completed in the last 6 months or scheduled in the next 3 months.  Verify A1C has been completed within the last 6 months and is below 8.5%     Last completed office visit: 10/8/2024 Gilma Turner MD   Next scheduled Follow up: No future appointments.   Last A1c result: Last A1c value was 7.7% done 10/8/2024.

## 2025-02-10 ENCOUNTER — PATIENT MESSAGE (OUTPATIENT)
Dept: ENDOCRINOLOGY CLINIC | Facility: CLINIC | Age: 60
End: 2025-02-10

## 2025-02-10 DIAGNOSIS — E11.65 TYPE 2 DIABETES MELLITUS WITH HYPERGLYCEMIA, WITHOUT LONG-TERM CURRENT USE OF INSULIN (HCC): Primary | ICD-10-CM

## 2025-02-10 NOTE — TELEPHONE ENCOUNTER
Per pt, ozempic and jardiance are needing PA.  He does not have anymore medication.     Attempted to do the PA for Jardiance:         Submitted PA for ozempic via Epic system even though pt needs to confirm his weekly dose.  Regardless, it will be the same pen based on LOV note (0.25 mg weekly x2 weeks, then increase to 0.5 mg weekly thereafter).  See referrals tab for copy of submission.     Dr. Turner - ok to replace Jardiance with Farxiga instead of proceeding with Jardiance PA? No notes in the chart that pt had failure nor allergic rxn in the past. Thank you.

## 2025-02-11 ENCOUNTER — TELEPHONE (OUTPATIENT)
Dept: ENDOCRINOLOGY CLINIC | Facility: CLINIC | Age: 60
End: 2025-02-11

## 2025-02-11 RX ORDER — DAPAGLIFLOZIN 5 MG/1
5 TABLET, FILM COATED ORAL DAILY
Qty: 30 TABLET | Refills: 0 | Status: SHIPPED | OUTPATIENT
Start: 2025-02-11 | End: 2025-02-12

## 2025-02-11 NOTE — TELEPHONE ENCOUNTER
Spoke to pharmacist: farxiga (dapagliflozin) and jardiance need PA  Expedited PA completed via [employer] for farxiga:  Case Id  [id number]  Reference Id  [id number]  Priority  Priority: Expedited

## 2025-02-11 NOTE — TELEPHONE ENCOUNTER
Drug not listed : JARDIANCE 25 mG Tablets  SIg: Take 1 tablet by mouth daily  Qty:90  Pharmacy message: plan does not cover this medication.Please call plan at [phone] to initiate prior authorization or call/fax pharmacy to change medication.Patient ID # is [id number]

## 2025-02-11 NOTE — TELEPHONE ENCOUNTER
Noted sent farxiga 5 mg daily . When I prescribe it, it says PA is needd. PLease call pharmacy to check     Is the PA of ozmepic submitted as urgent?   If not , can we make it urgent   If no reply by Friday, we can offer mounjaro 2.5 mg weekly free voucher   Can discuss that these are similar medications, similar SE and CI  Pen teaching: offer Nv/ pharmacist     Thanks

## 2025-02-12 ENCOUNTER — MED REC SCAN ONLY (OUTPATIENT)
Dept: ENDOCRINOLOGY CLINIC | Facility: CLINIC | Age: 60
End: 2025-02-12

## 2025-02-12 RX ORDER — DAPAGLIFLOZIN 5 MG/1
5 TABLET, FILM COATED ORAL DAILY
Qty: 30 TABLET | Refills: 0 | Status: SHIPPED | OUTPATIENT
Start: 2025-02-12 | End: 2025-03-14

## 2025-02-12 NOTE — TELEPHONE ENCOUNTER
Medication PA Requested:   INVOKANA 100MG Tablets                                                        CoverMyMeds Used: yes  Key:[id number]   Quantity: 30  tablets  Day Supply: 30  Sig: take one tablet by mouth daily  DX Code:    E11.65

## 2025-02-12 NOTE — TELEPHONE ENCOUNTER
Medication PA Requested:  Dapagliflozin (FARXIGA) 5 MG Oral Tab                                                         CoverMyMeds Used: yes  Key:[id number]   Quantity: 30 tablets  Day Supply: 30  Sig: Take 1 tablet (5 mg total) by mouth daily.   DX Code:   E11.65

## 2025-02-12 NOTE — TELEPHONE ENCOUNTER
Current Outpatient Medications   Medication Sig Dispense Refill   1) dapagliflozin (FARXIGA) 5 MG Oral Tab Take 1 tablet (5 mg total) by mouth daily. 30 tablet 0           Key: [id number]           2)  Drug not listed: INVOKANA 100MG Tablets   KEY: [id number]

## 2025-02-12 NOTE — TELEPHONE ENCOUNTER
Received fax from [employer]: I have reviewed the request to cover Dapagliflozin 5mg tab.  The information submitted did not meet the criteria necessary to approve the medication.  Based on the information provided, I am unable to approve coverage for this medication because:  There is no documentation that the patietnt was unable to obtain Farxiga (the brand name product) due to market availability   Dapagliflozin is considered medically necessary when there is documented inability to obtain Farxiga    Called [phone] for additional info since PA was completed for Farxiga 5mg - spoke to Ellyn to complete PA for Farxiga over the phone - completed and approved 2/12/25 - 12/31/2099  Ellyn ran test claim for farxiga 5mg - goes through insurance     Case # [id number]    Spoke to pharmacist:  farxiga goes through insurance with co-pay of $25    MC sent updating patient

## 2025-02-13 RX ORDER — LISINOPRIL 2.5 MG/1
2.5 TABLET ORAL DAILY
Qty: 90 TABLET | Refills: 3 | Status: SHIPPED | OUTPATIENT
Start: 2025-02-13

## 2025-02-13 RX ORDER — LISINOPRIL 2.5 MG/1
2.5 TABLET ORAL DAILY
Qty: 90 TABLET | Refills: 3 | OUTPATIENT
Start: 2025-02-13

## 2025-02-13 NOTE — TELEPHONE ENCOUNTER
Refill passed per [facility] protocol.     Requested Prescriptions   Pending Prescriptions Disp Refills    LISINOPRIL 2.5 MG Oral Tab [Pharmacy Med Name: LISINOPRIL 2.5MG TABLETS] 90 tablet 3     Sig: TAKE 1 TABLET(2.5 MG) BY MOUTH DAILY       Hypertension Medications Protocol Passed - 2/13/2025  8:19 AM        Passed - CMP or BMP in past 12 months        Passed - Last BP reading less than 140/90     BP Readings from Last 1 Encounters:   10/08/24 119/74               Passed - In person appointment or virtual visit in the past 12 mos or appointment in next 3 mos     Recent Outpatient Visits              2 months ago Need for shingles vaccine    [facility] Kvng Ham APRN    Office Visit    3 months ago Hammer toes of both feet    [facility], [place] Rosey Sandoval DPM    Office Visit    3 months ago Need for shingles vaccine    [facility] Flor Gasca PA-C    Office Visit    4 months ago Type 2 diabetes mellitus with hyperglycemia, without long-term current use of insulin ([facility])    [facility] Gilma Turner MD    Office Visit    4 months ago Diabetes mellitus type 2 in nonobese ([facility]    [facility] Visit                      Passed - EGFRCR or GFRNAA > 50     GFR Evaluation  EGFRCR: 100 , resulted on 6/12/2024          Passed - Medication is active on med list             [unfilled]      [unfilled]

## 2025-05-27 ENCOUNTER — OFFICE VISIT (OUTPATIENT)
Dept: ENDOCRINOLOGY CLINIC | Facility: CLINIC | Age: 60
End: 2025-05-27
Payer: COMMERCIAL

## 2025-05-27 ENCOUNTER — TELEPHONE (OUTPATIENT)
Dept: ENDOCRINOLOGY CLINIC | Facility: CLINIC | Age: 60
End: 2025-05-27

## 2025-05-27 VITALS
SYSTOLIC BLOOD PRESSURE: 125 MMHG | HEIGHT: 70 IN | DIASTOLIC BLOOD PRESSURE: 77 MMHG | BODY MASS INDEX: 27.92 KG/M2 | HEART RATE: 70 BPM | WEIGHT: 195 LBS

## 2025-05-27 DIAGNOSIS — Z13.29 THYROID DISORDER SCREENING: ICD-10-CM

## 2025-05-27 DIAGNOSIS — E11.69 TYPE 2 DIABETES MELLITUS WITH OTHER SPECIFIED COMPLICATION, WITHOUT LONG-TERM CURRENT USE OF INSULIN (HCC): Primary | ICD-10-CM

## 2025-05-27 DIAGNOSIS — E78.5 DYSLIPIDEMIA: ICD-10-CM

## 2025-05-27 LAB
GLUCOSE BLOOD: 130
HEMOGLOBIN A1C: 8.4 % (ref 4.3–5.6)
TEST STRIP LOT #: NORMAL NUMERIC

## 2025-05-27 PROCEDURE — 83036 HEMOGLOBIN GLYCOSYLATED A1C: CPT | Performed by: INTERNAL MEDICINE

## 2025-05-27 PROCEDURE — 82947 ASSAY GLUCOSE BLOOD QUANT: CPT | Performed by: INTERNAL MEDICINE

## 2025-05-27 PROCEDURE — 99214 OFFICE O/P EST MOD 30 MIN: CPT | Performed by: INTERNAL MEDICINE

## 2025-05-27 RX ORDER — HYDROCHLOROTHIAZIDE 12.5 MG/1
1 CAPSULE ORAL
Qty: 6 EACH | Refills: 0 | Status: SHIPPED | OUTPATIENT
Start: 2025-05-27

## 2025-05-27 RX ORDER — SEMAGLUTIDE 1.34 MG/ML
1 INJECTION, SOLUTION SUBCUTANEOUS WEEKLY
Qty: 9 ML | Refills: 0 | Status: SHIPPED | OUTPATIENT
Start: 2025-05-27

## 2025-05-27 NOTE — PROGRESS NOTES
Return Office Visit     CHIEF COMPLAINT:    DM   Hypertriglyceridemia    HISTORY OF PRESENT ILLNESS:  Christian Campos is a 59 year old male who presents for follow up for DM.     DM HISTORY:  Diagnosed: Around age 33      HISTORY OF DIABETES COMPLICATIONS: :  History of Retinopathy: yes , last eye exam 2021, will schedule eye exam now  History of Neuropathy: no   History of Nephropathy: no     ASSOCIATED COMPLICATIONS:    HTN: no  Hyperlipidemia: no   Coronary Artery Disease:  No   Cerebrovascular Disease:no       HOME GLUCOSE READINGS:      Fasting: late 100s     No low BG under 70        CURRENT DIABETIC MEDICATIONS INCLUDE:  MTF 1000 mg BID  Ozmepic 0.5 mg weekly  Jardiance 25 mg daily    MEALS:  Three meals a day  He has been compliant with a low carb diet    EXERCISE:  Has a dog and walks daily      CURRENT MEDICATION:    Current Outpatient Medications   Medication Sig Dispense Refill    semaglutide (OZEMPIC, 1 MG/DOSE,) 4 MG/3ML Subcutaneous Solution Pen-injector Inject 1 mg into the skin once a week. 9 mL 0    Continuous Glucose Sensor (FREESTYLE BALBIR 3 PLUS SENSOR) Does not apply Misc 1 each every 15 (fifteen) days. 6 each 0    lisinopril 2.5 MG Oral Tab Take 1 tablet (2.5 mg total) by mouth daily. 90 tablet 3    Tadalafil (CIALIS) 20 MG Oral Tab Take 1 tablet (20 mg total) by mouth as needed for Erectile Dysfunction. 12 tablet 5    clotrimazole-betamethasone 1-0.05 % External Cream Apply 1 Application topically 2 (two) times daily. 45 g 3    metFORMIN HCl 1000 MG Oral Tab Take 1 tablet (1,000 mg total) by mouth 2 (two) times daily with meals. 180 tablet 1    Fenofibrate 134 MG Oral Cap TAKE 1 CAPSULE BY MOUTH DAILY 90 capsule 0    omeprazole 20 MG Oral Capsule Delayed Release Take 1 capsule (20 mg total) by mouth every morning before breakfast. (For stomach acid)      colchicine 0.6 MG Oral Tab Take 1 tablet (0.6 mg total) by mouth in the morning, at noon, in the evening, and at bedtime. WHEN NEEDED FOR  PAIN STOP ROSUVASTATIN WHILE TAKING COLCHICINE 40 tablet 0    fluticasone-salmeterol 230-21 MCG/ACT Inhalation Aerosol Inhale 2 puffs into the lungs 2 (two) times daily. FOR ASTHMA. 1 YEAR SUPPLY. GENERIC. 1 each 3    atorvastatin 20 MG Oral Tab Take 1 tablet (20 mg total) by mouth nightly. 90 tablet 0    aspirin 81 MG Oral Chew Tab Chew 1 tablet (81 mg total) by mouth daily. 30 tablet 0         ALLERGY:  No Known Allergies    PAST MEDICAL, SOCIAL AND FAMILY HISTORY:  See past medical history marked as reviewed.  See past surgical history marked as reviewed.  See past family history marked as reviewed.  See past social history marked as reviewed.    ASSESSMENTS:       REVIEW OF SYSTEMS:  Constitutional: Negative for: weight change, fever, fatigue, cold/heat intolerance  Eyes: Negative for:  Visual changes, proptosis, blurring  ENT: Negative for:  dysphagia, neck swelling, dysphonia  Respiratory: Negative for:  dyspnea, cough  Cardiovascular: Negative for:  chest pain, palpitations, orthopnea  GI: Negative for:  abdominal pain, nausea, vomiting, diarrhea, constipation, bleeding  Neurology: Negative for: headache, numbness, weakness  Genito-Urinary: Negative for: dysuria, frequency  Psychiatric: Negative for:  depression, anxiety  Hematology/Lymphatics: Negative for: bruising, lower extremity edema  Endocrine: Negative for: polyuria, polydypsia  Skin: Negative for: rash, blister, cellulitis,         PHYSICAL EXAM:     Vitals reviewed    General Appearance:  alert, well developed, in no acute distress  Head: Atraumatic  Eyes:  normal conjunctivae, sclera., normal sclera and normal pupils  Throat/Neck: normal sound to voice. Normal hearing, normal speech  Respiratory:  Speaking in full sentences, non-labored. no increased work of breathing, no audible wheezing    Neuro: motor grossly intact, moving all extremities without difficulty  Psychiatric:  oriented to time, self, and place  Extremities: 10/2024:  Bilateral  barefoot skin diabetic exam is normal, visualized feet and the appearance is normal except callus/ lesion on toes --> podiatrist is on long term leave, referral to another podiatrist provided  Bilateral monofilament/sensation of both feet is normal.  Pulsation pedal pulse exam of both lower legs/feet is normal as well.              DATA:           A1c 8.4 % ( 5/2025)     ASSESSMENT AND PLAN:    1. Type 2 DM: with hyperglycemia      Plan:  Discussed the pathogenesis, natural course of diabetes. Patient understands the importance of glycemic control and the implications of uncontrolled diabetes including Diabetic ketoacidosis and various micro vascular and macrovascular complications.    a). Medications:      - c/w Metformin 1000 mg BID  No GI side effects.     -  Ozempic : increase to  1 mg weekly   No personal or family history of MEN syndrome  Patient counselled regarding side effects including injection site reactions, nausea, vomiting, diarrhea, pancreatitis, gastroparesis and rare side effect abdulkadir Abdirizak syndrome.    -  Jardiance 25 mg daily  Discussed side effects including UTI and fungal infections.  Discussed importance of hydration  Discussed recent FDA data about genital infections    Check and call with sugars as discussed    Jan 3 sample provided  Refill sent --> will start PA     b). No nephropathy  C). Instructed on importance of annual eye exams. He has retinopathy, referral to retina specialist provided  d). Foot exam: Daily feet exam explained  e). BG log maintainence explained in great detail, to get log and glucometer on next visit.  f). Life style changes reviewed  g). Hypoglycemia recognition and management discussed    2..Dyslipidemia    A) Discussed lifestyle modifications including reductions in dietary total and saturated fat, weight loss, aerobic exercise, and eating a diet rich in fruits and vegetables.  B)  on statin and fenofibrate. To call if he has any SE  Like muscle cramping  c)  Fasting lipid panel  has been ordered  RTC in 3-4 months.    Fasting labs ordered

## 2025-05-28 ENCOUNTER — LAB ENCOUNTER (OUTPATIENT)
Dept: LAB | Age: 60
End: 2025-05-28
Attending: INTERNAL MEDICINE
Payer: COMMERCIAL

## 2025-05-28 ENCOUNTER — TELEPHONE (OUTPATIENT)
Dept: ENDOCRINOLOGY CLINIC | Facility: CLINIC | Age: 60
End: 2025-05-28

## 2025-05-28 DIAGNOSIS — E78.5 DYSLIPIDEMIA: ICD-10-CM

## 2025-05-28 DIAGNOSIS — E11.69 TYPE 2 DIABETES MELLITUS WITH OTHER SPECIFIED COMPLICATION, WITHOUT LONG-TERM CURRENT USE OF INSULIN (HCC): ICD-10-CM

## 2025-05-28 DIAGNOSIS — Z13.29 THYROID DISORDER SCREENING: ICD-10-CM

## 2025-05-28 LAB
ALBUMIN SERPL-MCNC: 4.4 G/DL (ref 3.2–4.8)
ALBUMIN/GLOB SERPL: 1.7 {RATIO} (ref 1–2)
ALP LIVER SERPL-CCNC: 69 U/L (ref 45–117)
ALT SERPL-CCNC: 20 U/L (ref 10–49)
ANION GAP SERPL CALC-SCNC: 9 MMOL/L (ref 0–18)
AST SERPL-CCNC: 23 U/L (ref ?–34)
BILIRUB SERPL-MCNC: 0.6 MG/DL (ref 0.3–1.2)
BUN BLD-MCNC: 10 MG/DL (ref 9–23)
BUN/CREAT SERPL: 11.1 (ref 10–20)
CALCIUM BLD-MCNC: 8.9 MG/DL (ref 8.7–10.4)
CHLORIDE SERPL-SCNC: 104 MMOL/L (ref 98–112)
CHOLEST SERPL-MCNC: 188 MG/DL (ref ?–200)
CO2 SERPL-SCNC: 24 MMOL/L (ref 21–32)
CREAT BLD-MCNC: 0.9 MG/DL (ref 0.7–1.3)
CREAT UR-SCNC: 102 MG/DL
EGFRCR SERPLBLD CKD-EPI 2021: 99 ML/MIN/1.73M2 (ref 60–?)
FASTING PATIENT LIPID ANSWER: YES
FASTING STATUS PATIENT QL REPORTED: YES
GLOBULIN PLAS-MCNC: 2.6 G/DL (ref 2–3.5)
GLUCOSE BLD-MCNC: 157 MG/DL (ref 70–99)
HDLC SERPL-MCNC: 32 MG/DL (ref 40–59)
LDLC SERPL CALC-MCNC: 98 MG/DL (ref ?–100)
MICROALBUMIN UR-MCNC: 0.6 MG/DL
MICROALBUMIN/CREAT 24H UR-RTO: 5.9 UG/MG (ref ?–30)
NONHDLC SERPL-MCNC: 156 MG/DL (ref ?–130)
OSMOLALITY SERPL CALC.SUM OF ELEC: 286 MOSM/KG (ref 275–295)
POTASSIUM SERPL-SCNC: 4.2 MMOL/L (ref 3.5–5.1)
PROT SERPL-MCNC: 7 G/DL (ref 5.7–8.2)
SODIUM SERPL-SCNC: 137 MMOL/L (ref 136–145)
TRIGL SERPL-MCNC: 343 MG/DL (ref 30–149)
TSI SER-ACNC: 1.4 UIU/ML (ref 0.55–4.78)
VLDLC SERPL CALC-MCNC: 58 MG/DL (ref 0–30)

## 2025-05-28 PROCEDURE — 84443 ASSAY THYROID STIM HORMONE: CPT

## 2025-05-28 PROCEDURE — 82570 ASSAY OF URINE CREATININE: CPT

## 2025-05-28 PROCEDURE — 36415 COLL VENOUS BLD VENIPUNCTURE: CPT

## 2025-05-28 PROCEDURE — 80053 COMPREHEN METABOLIC PANEL: CPT

## 2025-05-28 PROCEDURE — 82043 UR ALBUMIN QUANTITATIVE: CPT

## 2025-05-28 PROCEDURE — 80061 LIPID PANEL: CPT

## 2025-05-30 ENCOUNTER — TELEPHONE (OUTPATIENT)
Dept: ENDOCRINOLOGY CLINIC | Facility: CLINIC | Age: 60
End: 2025-05-30

## 2025-05-30 NOTE — TELEPHONE ENCOUNTER
Medication Quantity Refills Start End   Continuous Glucose Sensor (FREESTYLE BALBIR 3 PLUS SENSOR) Does not apply Misc 6 each 0 2025 --   Si each every 15 (fifteen) days.     GISSELLE KAUR  KEY:MICAH

## 2025-06-14 ENCOUNTER — PATIENT MESSAGE (OUTPATIENT)
Dept: ENDOCRINOLOGY CLINIC | Facility: CLINIC | Age: 60
End: 2025-06-14

## 2025-06-16 RX ORDER — HYDROCHLOROTHIAZIDE 12.5 MG/1
1 CAPSULE ORAL
Qty: 6 EACH | Refills: 0 | Status: SHIPPED | OUTPATIENT
Start: 2025-06-16

## 2025-06-17 NOTE — TELEPHONE ENCOUNTER
Mcm sent in Welsh per note below   hello, You are receiving this message due to your nelly 3 not being covered by your insurance,  wanted to offer other options to get it. 1 option is by paying out of pocket for the sensor or 2nd option you can try another sensor brand called stello which compares to a dexcom. Please call us at [phone] with any other questions or concerns. Have a good day

## 2025-08-04 ENCOUNTER — TELEPHONE (OUTPATIENT)
Dept: ENDOCRINOLOGY CLINIC | Facility: CLINIC | Age: 60
End: 2025-08-04

## 2025-08-28 RX ORDER — SEMAGLUTIDE 1.34 MG/ML
1 INJECTION, SOLUTION SUBCUTANEOUS WEEKLY
Qty: 9 ML | Refills: 0 | Status: SHIPPED | OUTPATIENT
Start: 2025-08-28

## (undated) DIAGNOSIS — M54.2 NECK PAIN: Primary | ICD-10-CM

## (undated) DIAGNOSIS — E11.69 TYPE 2 DIABETES MELLITUS WITH OTHER SPECIFIED COMPLICATION, WITHOUT LONG-TERM CURRENT USE OF INSULIN (HCC): ICD-10-CM

## (undated) DEVICE — MEGADYNE E-Z CLEAN BLADE 2.75"

## (undated) DEVICE — SUT VICRYL 2-0 CT-1 J839D

## (undated) DEVICE — GAMMEX® PI HYBRID SIZE 9, STERILE POWDER-FREE SURGICAL GLOVE, POLYISOPRENE AND NEOPRENE BLEND: Brand: GAMMEX

## (undated) DEVICE — NON-ADHERENT DRESSING: Brand: TELFA

## (undated) DEVICE — GAUZE SPONGES,12 PLY: Brand: CURITY

## (undated) DEVICE — VIOLET BRAIDED (POLYGLACTIN 910), SYNTHETIC ABSORBABLE SUTURE: Brand: COATED VICRYL

## (undated) DEVICE — SUCTION CANISTER, 3000CC,SAFELINER: Brand: DEROYAL

## (undated) DEVICE — MICRO KOVER: Brand: UNBRANDED

## (undated) DEVICE — ENCORE® LATEX MICRO SIZE 7, STERILE LATEX POWDER-FREE SURGICAL GLOVE: Brand: ENCORE

## (undated) DEVICE — 5.0MM FINE DIAMOND

## (undated) DEVICE — 3M™ TEGADERM™ TRANSPARENT FILM DRESSING, 1626W, 4 IN X 4-3/4 IN (10 CM X 12 CM), 50 EACH/CARTON, 4 CARTON/CASE: Brand: 3M™ TEGADERM™

## (undated) DEVICE — ABSORBABLE HEMOSTAT (OXIDIZED REGENERATED CELLULOSE, U.S.P.): Brand: SURGICEL

## (undated) DEVICE — 4.0MM COARSE DIAMOND

## (undated) DEVICE — 3.0MM NEURO (MATCH HEAD) SOFT TOUCH

## (undated) DEVICE — LAMINECTOMY: Brand: MEDLINE INDUSTRIES, INC.

## (undated) DEVICE — Device: Brand: DEFENDO AIR/WATER/SUCTION AND BIOPSY VALVE

## (undated) DEVICE — ENDOSCOPY PACK - LOWER: Brand: MEDLINE INDUSTRIES, INC.

## (undated) DEVICE — SNAP KOVER: Brand: UNBRANDED

## (undated) DEVICE — SUT VICRYL 2-0 CT-2 J269H

## (undated) DEVICE — PEN: MARKING STD PT 100/CS: Brand: MEDICAL ACTION INDUSTRIES

## (undated) DEVICE — KIT DRN 1/8IN PVC 3 SPRG EVAC

## (undated) DEVICE — CUSHION INSERT PRONEVIEW LG

## (undated) DEVICE — SOLUTION  .9 1000ML BTL

## (undated) DEVICE — FRAZIER SUCTION INSTRUMENT 12 FR W/CONTROL VENT & OBTURATOR: Brand: FRAZIER

## (undated) NOTE — MR AVS SNAPSHOT
[address] [address]  [address], [address]  [address] [facility]  [phone]               Thank you for choosing us for your health care visit with Michell Garay. DO Jose L.   We are glad to serve you and happy to provide you with this high Current Medications          This list is accurate as of: 3/21/17  9:42 AM.  Always use your most recent med list.                Albuterol Sulfate  (90 Base) MCG/ACT Aers   Inhale 2 puffs into the lungs every 4 (four) hours as needed for Wheezing. Bring a paper prescription for each of these medications    - guaiFENesin-codeine 100-10 MG/5ML Soln            MyChart     Visit MyChart  You can access your MyChart to more actively manage your health care and view more details from this visit by going

## (undated) NOTE — LETTER
21          Christian [id number] [facility]  :  1965      To Whom It May Concern: This patient was seen in our office on 21 . Work status:   May return to work with the following restrictions: No pushing, pulling, lifting or carrying

## (undated) NOTE — MR AVS SNAPSHOT
[address] [facility]  [address] [place] [address] [phone]               Thank you for choosing us for your health care visit with SALO Guerrero. We are glad to serve you and happy to provide you with this summary of your visit. 4. Read all bowel prep instructions carefully    5.  AVOID seeds, nuts, popcorn, raw fruits and vegetables (cooked is okay) for 2-3 days before procedure         Allergies as of Mar 20, 2017     No Known Allergies                Today's Vital Signs     BP P If you've recently had a stay at the Hospital you can access your discharge instructions in [employer] by going to Visits < Admission Summaries.  If you've been to the Emergency Department or your doctor's office, you can view your past visit information in My

## (undated) NOTE — LETTER
4/5/2019          To Whom It May Concern:    Christian Guillory is currently under my medical care and may not return to work at this time. Please excuse Christian for 2 days. He may return to work on 4/8/19. Activity is restricted as follows: none.

## (undated) NOTE — LETTER
To Whom It May Concern:    Carlee Chun has been under our care regarding ongoing medical issues. Because of this, he has been required to restrict his physical activities. He may resume his usual activities, including work, on 8/22/22 with the following restrictions:    [x]  None     []    No heavy lifting (over 15 pounds) for               weeks   []    Part-time (no more than             hours per week) for               week   []  Other:        Please feel free to contact us if there are any questions.       Sincerely,      Julia Herman MD  [address]  [address] [facility] [address] [phone]        Document generated by:  Ann Pickard RN

## (undated) NOTE — LETTER
12/5/2019              Christian [address] 59         Dear Dima Martinez,    Our records indicate that the tests ordered for you by Osbaldo Lang MD  have not been done.   If you have, in fact, already comp

## (undated) NOTE — LETTER
[facility] SURGERY SCHEDULING FORM   [address] [place] [address] [place] [address]   [phone] (scheduling phone) [phone] (scheduling fax)     PATIENT INFORMATION   Last Name:      Krystal Tariq      First Name: []  IVCS  []  MAC  []  General   []  Choice  []  Lamar  []  Regional/              []  Spinal  []  No Anesthesia   [x]  Yes  []  No or using our own   Allergies: Patient has no known allergies.          Completed by:    Durrell Kanner      Date:    9/12/2019

## (undated) NOTE — LETTER
AUTHORIZATION FOR SURGICAL OPERATION OR OTHER PROCEDURE    1.  I hereby authorize Dr. Carlita Bermudez and the [facility] Office staff assigned to my case to perform the following operation and/or procedure at the [facility] Office:    RIGHT SI joint injection under ultrasound Patient signature:  ___________________________________________________             Relationship to Patient:           []  Parent    Responsible person                          []  Spouse  In case of minor or                    [] Other  _____________   In

## (undated) NOTE — LETTER
[facility] SURGERY SCHEDULING FORM   [address] [place] [address] [place] [address]   [phone] (scheduling phone) [phone] (scheduling fax)     PATIENT INFORMATION   Last Name:      Martin Bolden      First Name: []  No or using our own   Allergies: Patient has no known allergies.     Patient is diabetic, pls violet first     Completed by:    Reji Torres      Date:    7/31/2020

## (undated) NOTE — LETTER
AUTHORIZATION FOR SURGICAL OPERATION OR OTHER PROCEDURE    1. I hereby authorize Dr. Atilio Domingo, and [facility] staff assigned to my case to perform the following operation and/or procedure at the [facility] site:    _______________________________________________________________________________________________    Right shoulder.  _______________________________________________________________________________________________    2.  My physician has explained the nature and purpose of the operation or other procedure, possible alternative methods of treatment, the risks involved, and the possibility of complication to me.  I acknowledge that no guarantee has been made as to the result that may be obtained.  3.  I recognize that, during the course of this operation, or other procedure, unforseen conditions may necessitate additional or different procedure than those listed above.  I, therefore, further authorize and request that the above named physician, his/her physician assistants or designees perform such procedures as are, in his/her professional opinion, necessary and desirable.  4.  Any tissue or organs removed in the operation or other procedure may be disposed of by and at the discretion of the [facility] and [facility].  5.  I understand that in the event of a medical emergency, I will be transported by local paramedics to [facility] or other hospital emergency department.  6.  I certify that I have read and fully understand the above consent to operation and/or other procedure.    7.  I acknowledge that my physician has explained sedation/analgesia administration to me including the risks and benefits.  I consent to the administration of sedation/analgesia as may be necessary or desirable in the judgement of my physician.    Witness signature: ___________________________________________________ Date:  ______/______/_____                     Time:  ________ A.M.  P.M.       Patient Name:  ______________________________________________________  (please print)      Patient signature:  ___________________________________________________             Relationship to Patient:           []  Parent    Responsible person                          []  Spouse  In case of minor or                    [] Other  _____________   Incompetent name:  __________________________________________________                               (please print)      _____________      Responsible person  In case of minor or  Incompetent signature:  _______________________________________________    Statement of Physician  My signature below affirms that prior to the time of the procedure, I have explained to the patient and/or his/her guardian, the risks and benefits involved in the proposed treatment and any reasonable alternative to the proposed treatment.  I have also explained the risks and benefits involved in the refusal of the proposed treatment and have answered the patient's questions.                        Date:  ______/______/_______  Provider                      Signature:  __________________________________________________________       Time:  ___________ A.M    P.M.

## (undated) NOTE — LETTER
AUTHORIZATION FOR SURGICAL OPERATION OR OTHER PROCEDURE    1.  I hereby authorize Dr. Dr. Radha Patterson  and the [facility] Office staff assigned to my case to perform the following operation and/or procedure at the [facility] Office:    _LEFT SI joint injection under ultra Relationship to Patient:           []  Parent    Responsible person                          []  Spouse  In case of minor or                    [] Other  _____________   Incompetent name:  __________________________________________________

## (undated) NOTE — LETTER
19          Christian Krishna  :  1965      To Whom It May Concern: This patient was seen in our office on 19 .      May return to work status per above effective 19    If this office may be of further assistance, please

## (undated) NOTE — MR AVS SNAPSHOT
[address] [address]  [address]  [address] [facility]  [phone]               Thank you for choosing us for your health care visit with Manuel Domingo DO.   We are glad to serve you and happy to provide you with this high schedule your appointment. Failure to obtain required authorization numbers can create reimbursement difficulties for you.       Assoc Dx:  Chronic left shoulder pain [M25.512, G89.29]          Reason for Today's Visit     Shoulder Pain           Medical Is If you've recently had a stay at the Hospital you can access your discharge instructions in [employer] by going to Visits < Admission Summaries.  If you've been to the Emergency Department or your doctor's office, you can view your past visit information in My

## (undated) NOTE — MR AVS SNAPSHOT
[address]  [phone]               Thank you for choosing us for your health care visit with Macy Dumont MD.  We are glad to serve you and happy to provide you with this summary of your visit. Generic drug:  Dapagliflozin Propanediol   Take 10 mg by mouth daily. guaiFENesin-codeine 100-10 MG/5ML Soln   Take 10 mL by mouth every 6 (six) hours as needed for cough.    Commonly known as:  CHERATUSSIN AC           ibuprofen 100 MG Tabs   [place]

## (undated) NOTE — LETTER
AUTHORIZATION FOR SURGICAL OPERATION OR OTHER PROCEDURE    1.  I hereby authorize Dr. aNi Hoff  and the [facility] staff assigned to my case to perform the following operation and/or procedure at the [facility] Office:    RIGHT lumbosacral myofascial trigger point an [id number]    Patient Name:   (please print)       Patient signature:  ___________________________________________________             Relationship to Patient:           []  Parent    Responsible person

## (undated) NOTE — LETTER
AUTHORIZATION FOR SURGICAL OPERATION OR OTHER PROCEDURE    1. I hereby authorize Dr. Mike Bullock  and the [facility] Office staff assigned to my case to perform the following operation and/or procedure at the [facility] Office:    RIGHT SI joint injection     2.   My phys Relationship to Patient:           []  Parent    Responsible person                          []  Spouse  In case of minor or                    [] Other  _____________   Incompetent name:  __________________________________________________

## (undated) NOTE — LETTER
4/9/2019              Christian Bolden        [address] [facility]         Dear Fab Trevino,    Our records indicate that the FASTING LIPID blood test ordered for you by Tita Crews MD  have not been done.   If you have, in fact, a

## (undated) NOTE — LETTER
[address]  [address],  [address]       10/30/20        Patient: Jhony Campos   YOB: 1965   Date of Visit: 10/30/2020       Dear  Dr. Katina Quiroz,      Thank you for referring [facility]

## (undated) NOTE — IP AVS SNAPSHOT
[facility]    [address], [place] ~ [phone]                Discharge Summary   5/12/2017    Leda Burow [facility]           Admission Information        Provider Department    5/12/2017 Sobia Avila.  Rebecca Dias - Resume your regular diet as tolerated unless otherwise instructed. - Start with light meals to minimize bloating.  - Do not drink alcohol today.     Medication:  - If you have questions about resuming your normal medications, please contact your Primary - If you are a smoker or have smoked in the last 12 months, we encourage you to explore options for quitting.     - If you have concerns related to behavioral health issues or thoughts of harming yourself, contact [facility] a

## (undated) NOTE — LETTER
19          Christian Gatica  :  1965      To Whom It May Concern: This patient was seen in our office on 19 . Work status:   May return to work full-time, no restrictions        If this office may be of further assistance, p